# Patient Record
Sex: MALE | Race: WHITE
[De-identification: names, ages, dates, MRNs, and addresses within clinical notes are randomized per-mention and may not be internally consistent; named-entity substitution may affect disease eponyms.]

---

## 2012-06-25 VITALS — SYSTOLIC BLOOD PRESSURE: 139 MMHG | DIASTOLIC BLOOD PRESSURE: 68 MMHG

## 2018-12-28 ENCOUNTER — HOSPITAL ENCOUNTER (INPATIENT)
Dept: HOSPITAL 97 - ER | Age: 63
LOS: 2 days | Discharge: HOME | DRG: 639 | End: 2018-12-30
Attending: INTERNAL MEDICINE | Admitting: INTERNAL MEDICINE
Payer: COMMERCIAL

## 2018-12-28 DIAGNOSIS — Z89.511: ICD-10-CM

## 2018-12-28 DIAGNOSIS — E10.40: ICD-10-CM

## 2018-12-28 DIAGNOSIS — Z89.512: ICD-10-CM

## 2018-12-28 DIAGNOSIS — J11.1: ICD-10-CM

## 2018-12-28 DIAGNOSIS — I25.10: ICD-10-CM

## 2018-12-28 DIAGNOSIS — E10.10: Primary | ICD-10-CM

## 2018-12-28 DIAGNOSIS — I10: ICD-10-CM

## 2018-12-28 DIAGNOSIS — K21.9: ICD-10-CM

## 2018-12-28 DIAGNOSIS — Z87.891: ICD-10-CM

## 2018-12-28 DIAGNOSIS — J44.9: ICD-10-CM

## 2018-12-28 DIAGNOSIS — E03.9: ICD-10-CM

## 2018-12-28 LAB
ALBUMIN SERPL BCP-MCNC: 3.3 G/DL (ref 3.4–5)
ALP SERPL-CCNC: 96 U/L (ref 45–117)
ALT SERPL W P-5'-P-CCNC: 24 U/L (ref 12–78)
AST SERPL W P-5'-P-CCNC: 20 U/L (ref 15–37)
BUN BLD-MCNC: 22 MG/DL (ref 7–18)
BUN BLD-MCNC: 24 MG/DL (ref 7–18)
GLUCOSE SERPLBLD-MCNC: 242 MG/DL (ref 74–106)
GLUCOSE SERPLBLD-MCNC: 340 MG/DL (ref 74–106)
HCT VFR BLD CALC: 43.3 % (ref 39.6–49)
LYMPHOCYTES # SPEC AUTO: 1.5 K/UL (ref 0.7–4.9)
PMV BLD: 9.5 FL (ref 7.6–11.3)
POTASSIUM SERPL-SCNC: 3.5 MMOL/L (ref 3.5–5.1)
POTASSIUM SERPL-SCNC: 4.1 MMOL/L (ref 3.5–5.1)
RBC # BLD: 4.77 M/UL (ref 4.33–5.43)

## 2018-12-28 PROCEDURE — 84439 ASSAY OF FREE THYROXINE: CPT

## 2018-12-28 PROCEDURE — 96375 TX/PRO/DX INJ NEW DRUG ADDON: CPT

## 2018-12-28 PROCEDURE — 87086 URINE CULTURE/COLONY COUNT: CPT

## 2018-12-28 PROCEDURE — 83735 ASSAY OF MAGNESIUM: CPT

## 2018-12-28 PROCEDURE — 87804 INFLUENZA ASSAY W/OPTIC: CPT

## 2018-12-28 PROCEDURE — 84443 ASSAY THYROID STIM HORMONE: CPT

## 2018-12-28 PROCEDURE — 71045 X-RAY EXAM CHEST 1 VIEW: CPT

## 2018-12-28 PROCEDURE — 80076 HEPATIC FUNCTION PANEL: CPT

## 2018-12-28 PROCEDURE — 94640 AIRWAY INHALATION TREATMENT: CPT

## 2018-12-28 PROCEDURE — 96361 HYDRATE IV INFUSION ADD-ON: CPT

## 2018-12-28 PROCEDURE — 83036 HEMOGLOBIN GLYCOSYLATED A1C: CPT

## 2018-12-28 PROCEDURE — 84100 ASSAY OF PHOSPHORUS: CPT

## 2018-12-28 PROCEDURE — 87088 URINE BACTERIA CULTURE: CPT

## 2018-12-28 PROCEDURE — 36415 COLL VENOUS BLD VENIPUNCTURE: CPT

## 2018-12-28 PROCEDURE — 99285 EMERGENCY DEPT VISIT HI MDM: CPT

## 2018-12-28 PROCEDURE — 81003 URINALYSIS AUTO W/O SCOPE: CPT

## 2018-12-28 PROCEDURE — 96374 THER/PROPH/DIAG INJ IV PUSH: CPT

## 2018-12-28 PROCEDURE — 82962 GLUCOSE BLOOD TEST: CPT

## 2018-12-28 PROCEDURE — 82010 KETONE BODYS QUAN: CPT

## 2018-12-28 PROCEDURE — 80048 BASIC METABOLIC PNL TOTAL CA: CPT

## 2018-12-28 PROCEDURE — 85025 COMPLETE CBC W/AUTO DIFF WBC: CPT

## 2018-12-28 PROCEDURE — 87040 BLOOD CULTURE FOR BACTERIA: CPT

## 2018-12-28 NOTE — EDPHYS
Physician Documentation                                                                           

 [facility]                                                                

Name: Aj Morocho                                                                                 

Age: 63 yrs                                                                                       

Sex: Male                                                                                         

: 1955                                                                                   

MRN: [id number]                                                                                   

Arrival Date: 2018                                                                          

Time: 19:58                                                                                       

Account#: [id number]                                                                            

Bed 23                                                                                            

Private MD:                                                                                       

ED Physician Govind Rodriguez                                                                       

HPI:                                                                                              

                                                                                             

20:14 This 63 yrs old  Male presents to ER via EMS with complaints of Abdominal      kdr 

      Pain, Vomiting, High Blood Sugar.                                                           

20:14 The patient presents to the emergency department with nausea, that is mild, vomiting,   kdr 

      that is intermittent. The patient presents to the emergency department with abdominal       

      pain, of the abdomen diffusely, described as achy, burning, crampy, intermittent, and       

      does not radiate. Onset: The symptoms/episode began/occurred gradually, 2 day(s) ago.       

      Possible causes: unknown. The symptoms are aggravated by food , The symptoms are            

      alleviated by nothing. Associated signs and symptoms: Pertinent positives: abdominal        

      pain, nausea, vomiting, Pertinent negatives: belching, constipation, diarrhea, dysuria,     

      fever, flatulence, GI bleeding, hematuria. Severity of symptoms: At their worst the         

      symptoms were mild moderate just prior to arrival, in the emergency department the          

      symptoms are unchanged. The patient has experienced similar episodes in the past,           

      several times. The patient has not recently seen a physician.                               

                                                                                                  

Historical:                                                                                       

- Allergies:                                                                                      

20:05 No Known Allergies;                                                                     bb  

- Home Meds:                                                                                      

20:05 Unable to obtain [Active];                                                              bb  

- PMHx:                                                                                           

20:05 COPD; Diabetes - IDDM; Hypertension;                                                    bb  

- PSHx:                                                                                           

20:05 hemorrhoidectomy; Heart stents; bilateral lower extremity amputations;                  bb  

                                                                                                  

- Immunization history:: Adult Immunizations up to date, Flu vaccine is up to date.               

- Social history:: Smoking status: Patient/guardian denies using tobacco,                         

  Patient/guardian denies using alcohol, street drugs.                                            

- Ebola Screening: : No symptoms or risks identified at this time.                                

                                                                                                  

                                                                                                  

ROS:                                                                                              

20:14 Constitutional: Negative for fever, chills, and weight loss, Eyes: Negative for injury, kdr 

      pain, redness, and discharge, ENT: Negative for injury, pain, and discharge, Neck:          

      Negative for injury, pain, and swelling, Cardiovascular: Negative for chest pain,           

      palpitations, and edema, Respiratory: Negative for shortness of breath, cough,              

      wheezing, and pleuritic chest pain, Back: Negative for injury and pain, : Negative        

      for injury, bleeding, discharge, and swelling, MS/Extremity: Negative for injury and        

      deformity, Skin: Negative for injury, rash, and discoloration, Neuro: Negative for          

      headache, weakness, numbness, tingling, and seizure activity. Psych: Negative for           

      depression, anxiety, suicide ideation, homicidal ideation, and hallucinations,              

      Allergy/Immunology: Negative for hives, rash, and allergies, Endocrine: Negative for        

      neck swelling, polydipsia, polyuria, polyphagia, and marked weight changes,                 

      Hematologic/Lymphatic: Negative for swollen nodes, abnormal bleeding, and unusual           

      bruising.                                                                                   

20:14 Abdomen/GI: Positive for abdominal pain, nausea and vomiting.                               

                                                                                                  

Exam:                                                                                             

20:14 Constitutional:  This is a well developed, well nourished patient who is awake, alert,  kdr 

      and in no acute distress. Head/Face:  Normocephalic, atraumatic. Eyes:  Pupils equal        

      round and reactive to light, extra-ocular motions intact.  Lids and lashes normal.          

      Conjunctiva and sclera are non-icteric and not injected.  Cornea within normal limits.      

      Periorbital areas with no swelling, redness, or edema. Neck:  Trachea midline, no           

      thyromegaly or masses palpated, and no cervical lymphadenopathy.  Supple, full range of     

      motion without nuchal rigidity, or vertebral point tenderness.  No Meningismus.             

      Chest/axilla:  Normal chest wall appearance and motion.  Nontender with no deformity.       

      No lesions are appreciated. Cardiovascular:  Regular rate and rhythm with a normal S1       

      and S2.  No gallops, murmurs, or rubs.  Normal PMI, no JVD.  No pulse deficits.             

      Respiratory:  Lungs have equal breath sounds bilaterally, clear to auscultation and         

      percussion.  No rales, rhonchi or wheezes noted.  No increased work of breathing, no        

      retractions or nasal flaring. Back:  No spinal tenderness.  No costovertebral               

      tenderness.  Full range of motion. Skin:  Warm, dry with normal turgor.  Normal color       

      with no rashes, no lesions, and no evidence of cellulitis. MS/ Extremity:  Pulses           

      equal, no cyanosis.  Neurovascular intact.  Full, normal range of motion. Neuro:  Awake     

      and alert, GCS 15, oriented to person, place, time, and situation.  Cranial nerves          

      II-XII grossly intact.  Motor strength 5/5 in all extremities.  Sensory grossly intact.     

       Cerebellar exam normal.  Normal gait. Psych:  Awake, alert, with orientation to            

      person, place and time.  Behavior, mood, and affect are within normal limits.               

20:14 Abdomen/GI: Inspection: abdomen appears normal, Bowel sounds: active, diminished, in        

      all quadrants, Palpation: soft, mild abdominal tenderness, in the abdomen diffusely,        

      mass, is not appreciated, rebound tenderness, is not appreciated, voluntary guarding,       

      is not appreciated, involuntary guarding, is not appreciated.                               

                                                                                                  

Vital Signs:                                                                                      

20:05  / 44; Pulse 100; Resp 16 S; Temp 98.1(O); Pulse Ox 97% on R/A; Weight 49.9 kg    bb  

      (R); Height 5 ft. 0 in. (152.40 cm) (R);                                                    

20:40  / 50; Pulse 109; Resp 18; Pulse Ox 98% on R/A;                                   kr2 

21:49  / 63; Pulse 114; Resp 18; Pulse Ox 98% on R/A;                                   kr2 

22:32  / 59; Pulse 115; Resp 17; Pulse Ox 97% ;                                         kr2 

                                                                                             

00:28  / 59; Pulse 110; Resp 17; Pulse Ox 99% on R/A;                                   kr2 

                                                                                             

20:05 Body Mass Index 21.48 (49.90 kg, 152.40 cm)                                             bb  

                                                                                                  

MDM:                                                                                              

                                                                                             

20:03 Patient medically screened.                                                             kdr 

20:14 Data reviewed: vital signs, nurses notes, lab test result(s), radiologic studies.       kdr 

      Counseling: I had a detailed discussion with the patient and/or guardian regarding: the     

      historical points, exam findings, and any diagnostic results supporting the                 

      discharge/admit diagnosis, lab results, radiology results.                                  

                                                                                                  

                                                                                             

20:13 Order name: CBC with Diff; Complete Time: 21:50                                         kdr 

                                                                                             

20:13 Order name: Chem 7; Complete Time: 21:50                                                kdr 

                                                                                             

20:13 Order name: LFT's; Complete Time: 21:50                                                 kdr 

                                                                                             

20:13 Order name: Ketone, Serum; Complete Time: 21:50                                         kdr 

                                                                                             

22:53 Order name: Chem 7; Complete Time: 23:51                                                kr2 

                                                                                             

23:32 Order name: Blood Culture Adult (2)                                                     kdr 

                                                                                             

23:32 Order name: CXR XRAY                                                                    kdr 

                                                                                             

23:32 Order name: Urine Culture                                                               [facility] 

                                                                                             

23:46 Order name: Urine Dipstick--Ancillary (enter results)                                   [facility] 

                                                                                             

22:15 Order name: FSBS; Complete Time: 22:15                                                  kdr 

                                                                                             

23:32 Order name: Urine Dipstick-Ancillary (obtain specimen); Complete Time: 23:46            kdr 

                                                                                                  

Administered Medications:                                                                         

      Discontinued: NS 0.9% 1000 ml IV at 200 ml/hr continuous                                    

20:20 Drug: NS 0.9% 1000 ml Route: IV; Rate: 1 bolus; Site: right forearm;                    kr2 

21:53 Follow up: Response: No adverse reaction; IV Status: Completed infusion                 kr2 

20:20 Drug: Zofran 4 mg Route: IVP; Site: right forearm;                                      kr2 

21:00 Follow up: Response: No adverse reaction; Nausea is decreased                           kr2 

22:00 Drug: Insulin Regular Human 5 units {Co-Signature: mg2 (Alexis Hall RN).} Route:    kr2 

      IVP; Site: right forearm;                                                                   

23:00 Follow up: Response: No adverse reaction; Blood sugar is lowered                        kr2 

22:25 Drug: NS 0.9% 1000 ml Route: IV; Rate: 200 ml/hr; Site: right forearm;                  kr2 

22:29 Drug: Zofran 4 mg Route: IVP; Site: right forearm;                                      kr2 

22:53 Follow up: Response: No adverse reaction; Nausea is decreased                           kr2 

                                                                                             

00:22 Drug: Insulin Drip - (Insulin Regular Human 100 units, NS 0.9% 100 ml) {Co-Signature:   kr2 

      mg2 (Alexis Hall RN).} Route: IV; Rate: calculated rate; Site: right forearm;           

                                                                                                  

                                                                                                  

Point of Care Testing:                                                                            

      Blood Glucose:                                                                              

                                                                                             

20:13 Blood Glucose: 276 mg/dL;                                                               mg2 

22:26 Blood Glucose: 234 mg/dL;                                                               kr2 

23:08 Blood Glucose: 214 mg/dL;                                                               jp3 

                                                                                             

00:27 Blood Glucose: 194 mg/dL;                                                               kr2 

      Ranges:                                                                                     

      Critical Glucose Levels:Adult <50 mg/dl or >400 mg/dl  <40 mg/dl or >180 mg/dl       

Disposition:                                                                                      

18 21:53 Hospitalization ordered by Amalia Lam for Inpatient Admission. Preliminary    

  diagnosis is DKA.                                                                               

- Bed requested for Intensive Care Unit.                                                          

- Status is Inpatient Admission.                                                              kr2 

- Condition is Fair.                                                                              

- Problem is an acute exacerbation.                                                               

- Symptoms have improved.                                                                         

UTI on Admission? No                                                                              

                                                                                                  

                                                                                                  

                                                                                                  

Signatures:                                                                                       

Dispatcher [employer]                           EDMS                                                 

Shalini Hall RN RN                                                      

Govind Rodriguez MD MD   [facility]                                                  

Roseanne Ray RN                     RN   bb                                                   

Diana Pathak, WILLIAM                       RN   kr2                                                  

Alexis Hall RN                           mg2                                                  

                                                                                                  

Corrections: (The following items were deleted from the chart)                                    

                                                                                             

23:57 21:53 Hospitalization Ordered by Amalia Lam MD for Observation. Preliminary         kdr 

      diagnosis is DKA. Bed requested for Telemetry/MedSurg (observation). Status is              

      Observation. Condition is Fair. Problem is an acute exacerbation. Symptoms have             

      improved. UTI on Admission? No. kdr                                                         

                                                                                             

00:03  23:57 2018 21:53 Hospitalization Ordered by Amalia Lam MD for Inpatient mw  

      Admission. Preliminary diagnosis is DKA. Bed requested for Intensive Care Unit. Status      

      is Inpatient Admission. Condition is Fair. Problem is an acute exacerbation. Symptoms       

      have improved. UTI on Admission? No. kdr                                                    

                                                                                             

01:29 00:03 2018 21:53 Hospitalization Ordered by Amalia Lam MD for Inpatient       kr2 

      Admission. Preliminary diagnosis is DKA. Bed requested for Intensive Care Unit. Status      

      is Inpatient Admission. Condition is Fair. Problem is an acute exacerbation. Symptoms       

      have improved. UTI on Admission? No. mw                                                     

                                                                                                  

**************************************************************************************************

## 2018-12-28 NOTE — ER
Nurse's Notes                                                                                     

 [facility]                                                                

Name: Aj Morocho                                                                                 

Age: 63 yrs                                                                                       

Sex: Male                                                                                         

: 1955                                                                                   

MRN: [id number]                                                                                   

Arrival Date: 2018                                                                          

Time: 19:58                                                                                       

Account#: [id number]                                                                            

Bed 23                                                                                            

Private MD:                                                                                       

Diagnosis: DKA                                                                                    

                                                                                                  

Presentation:                                                                                     

                                                                                             

19:59 Presenting complaint: EMS states: they were toned out for report of pt with abdominal   bb  

      pain, vomiting x 2 days also elevated blood sugar. Transition of care: patient was not      

      received from another setting of care. Onset of symptoms was 2018. Risk        

      Assessment: Do you want to hurt yourself or someone else? Patient reports no desire to      

      harm self or others. Initial Sepsis Screen: Does the patient meet any 2 criteria? No.       

      Patient's initial sepsis screen is negative. Does the patient have a suspected source       

      of infection? No. Patient's initial sepsis screen is negative. Care prior to arrival:       

      None.                                                                                       

19:59 Method Of Arrival: EMS: [facility]                                                       bb  

19:59 Acuity: OZZY 2                                                                           bb  

                                                                                                  

Historical:                                                                                       

- Allergies:                                                                                      

20:05 No Known Allergies;                                                                     bb  

- Home Meds:                                                                                      

20:05 Unable to obtain [Active];                                                              bb  

- PMHx:                                                                                           

20:05 COPD; Diabetes - IDDM; Hypertension;                                                    bb  

- PSHx:                                                                                           

20:05 hemorrhoidectomy; Heart stents; bilateral lower extremity amputations;                  bb  

                                                                                                  

- Immunization history:: Adult Immunizations up to date, Flu vaccine is up to date.               

- Social history:: Smoking status: Patient/guardian denies using tobacco,                         

  Patient/guardian denies using alcohol, street drugs.                                            

- Ebola Screening: : No symptoms or risks identified at this time.                                

                                                                                                  

                                                                                                  

Screenin:00 Abuse screen: Denies threats or abuse. Denies injuries from another. Nutritional        kr2 

      screening: No deficits noted. Tuberculosis screening: No symptoms or risk factors           

      identified. Fall Risk Gait- Normal/Bed Rest/Wheelchair (0 pts).                             

                                                                                                  

Assessment:                                                                                       

20:00 General: Appears in no apparent distress. uncomfortable, slender, Behavior is calm,     kr2 

      cooperative, appropriate for age. Pain: Complains of pain in abdomen Pain does not          

      radiate. Pain currently is 6 out of 10 on a pain scale. Quality of pain is described as     

      aching, Is continuous, Alleviated by nothing. Neuro: Level of Consciousness is awake,       

      alert, obeys commands, Oriented to person, place, time, situation. Cardiovascular:          

      Heart tones S1 S2 present Patient's skin is warm and dry. Rhythm is regular.                

      Respiratory: Airway is patent Respiratory effort is even, unlabored, Respiratory            

      pattern is regular, symmetrical. GI: Abdomen is flat, non-distended, Bowel sounds           

      present X 4 quads. Abd is soft X 4 quads Abdomen is tender to palpation X 4 quads.          

      Reports lower abdominal pain, upper abdominal pain, nausea, vomiting. EENT: Nares are       

      clear bilaterally Oral mucosa is moist. Derm: Skin is intact, with poor turgor Skin is      

      pink, warm \T\ dry. Musculoskeletal: Amputation of right leg below knee, left leg above     

      knee.                                                                                       

21:00 Reassessment: Patient appears in no apparent distress at this time. Patient and/or      kr2 

      family updated on plan of care and expected duration. Pain level reassessed. Patient is     

      alert, oriented x 3, equal unlabored respirations, skin warm/dry/pink. Patient states       

      feeling better.                                                                             

22:26 Reassessment: Patient complains of nausea, provider notified, see MAR.                  kr2 

23:30 Reassessment: Patient appears in no apparent distress at this time. Patient and/or      kr2 

      family updated on plan of care and expected duration. Pain level reassessed. Patient is     

      alert, oriented x 3, equal unlabored respirations, skin warm/dry/pink.                      

                                                                                             

00:45 Reassessment: Patient appears in no apparent distress at this time. Per Dr. Benton,   kr2 

      stop NS and start patient on D5 1/2 NS at \T\ 200mL per hour. See [employer] note.              

01:18 Reassessment: Report called to WILLIAM Sanders.                                               kr2 

                                                                                                  

Vital Signs:                                                                                      

                                                                                             

20:05  / 44; Pulse 100; Resp 16 S; Temp 98.1(O); Pulse Ox 97% on R/A; Weight 49.9 kg    bb  

      (R); Height 5 ft. 0 in. (152.40 cm) (R);                                                    

20:40  / 50; Pulse 109; Resp 18; Pulse Ox 98% on R/A;                                   kr2 

21:49  / 63; Pulse 114; Resp 18; Pulse Ox 98% on R/A;                                   kr2 

22:32  / 59; Pulse 115; Resp 17; Pulse Ox 97% ;                                         kr2 

                                                                                             

00:28  / 59; Pulse 110; Resp 17; Pulse Ox 99% on R/A;                                   kr2 

                                                                                             

20:05 Body Mass Index 21.48 (49.90 kg, 152.40 cm)                                             bb  

                                                                                                  

ED Course:                                                                                        

                                                                                             

19:58 Patient arrived in ED.                                                                  bb  

20:03 Triage completed.                                                                       bb  

20:03 Govind Rodriguez MD is Attending Physician.                                              kdr 

20:05 Arm band placed on Patient placed in an exam room, on a stretcher, on pulse oximetry.   bb  

20:13 Diana Pathak, RN is Primary Nurse.                                                     kr2 

20:13 Inserted saline lock: 22 gauge in right forearm, using aseptic technique. Blood         mg2 

      collected.                                                                                  

20:13 Initial lab(s) drawn, by ED staff, sent to lab.                                         jp3 

20:40 Patient has correct armband on for positive identification. Bed in low position. Call   kr2 

      light in reach. Side rails up X2. Pulse ox on. NIBP on. Door closed. Warm blanket           

      given. Pillow given. Head of bed elevated.                                                  

21:53 Amalia Lam MD is Hospitalizing Provider.                                          kdr 

23:16 Lab(s) recollected, by me, sent to lab. via 23-gauge butterfly in Right thumb.          jp3 

23:16 Chem 7 Sent.                                                                            jp3 

23:46 Urine Culture Sent.                                                                     kr2 

23:51 Notified ED physician of a critical lab result(s). Camelia 14, Dr. Rodriguez.              kr2 

                                                                                             

00:01 X-ray completed. Portable x-ray completed in exam room. Patient tolerated procedure     sg4 

      well.                                                                                       

00:06 CXR XRAY In Process Unspecified.                                                        EDMS

01:16 No provider procedures requiring assistance completed.                                  mg2 

01:19 Patient admitted, IV remains in place.                                                  kr2 

                                                                                                  

Administered Medications:                                                                         

      Discontinued: NS 0.9% 1000 ml IV at 200 ml/hr continuous                                    

                                                                                             

20:20 Drug: NS 0.9% 1000 ml Route: IV; Rate: 1 bolus; Site: right forearm;                    kr2 

21:53 Follow up: Response: No adverse reaction; IV Status: Completed infusion                 kr2 

20:20 Drug: Zofran 4 mg Route: IVP; Site: right forearm;                                      kr2 

21:00 Follow up: Response: No adverse reaction; Nausea is decreased                           kr2 

22:00 Drug: Insulin Regular Human 5 units {Co-Signature: mg2 (Alexis Hall RN).} Route:    kr2 

      IVP; Site: right forearm;                                                                   

23:00 Follow up: Response: No adverse reaction; Blood sugar is lowered                        kr2 

22:25 Drug: NS 0.9% 1000 ml Route: IV; Rate: 200 ml/hr; Site: right forearm;                  kr2 

22:29 Drug: Zofran 4 mg Route: IVP; Site: right forearm;                                      kr2 

22:53 Follow up: Response: No adverse reaction; Nausea is decreased                           kr2 

                                                                                             

00:22 Drug: Insulin Drip - (Insulin Regular Human 100 units, NS 0.9% 100 ml) {Co-Signature:   kr2 

      mg2 (Alexis Hall RN).} Route: IV; Rate: calculated rate; Site: right forearm;           

                                                                                                  

                                                                                                  

Point of Care Testing:                                                                            

      Blood Glucose:                                                                              

                                                                                             

20:13 Blood Glucose: 276 mg/dL;                                                               mg2 

22:26 Blood Glucose: 234 mg/dL;                                                               kr2 

23:08 Blood Glucose: 214 mg/dL;                                                               jp3 

                                                                                             

00:27 Blood Glucose: 194 mg/dL;                                                               kr2 

      Ranges:                                                                                     

                                                                                                  

Outcome:                                                                                          

                                                                                             

21:53 Decision to Hospitalize by Provider.                                                    kdr 

                                                                                             

01:19 Admitted to ICU accompanied by nurse, via stretcher, room 7, on monitor, with chart,    kr2 

      Report called to  Marilyn                                                                     

      Condition: stable                                                                           

      Instructed on the need for admit, Demonstrated understanding of instructions.               

:29 Patient left the ED.                                                                    kr2 

                                                                                                  

Signatures:                                                                                       

Dispatcher [employer]                           EDMS                                                 

Govind Rodriguez MD MD   kdr                                                  

Roseanne Ray RN RN   bb                                                   

Diana Pathak RN RN   kr2                                                  

Alexis Hall RN RN   mg2                                                  

Flo Blair                              jp3                                                  

Mari Goodson                               4                                                  

Alexis Hall RN                           mg2                                                  

                                                                                                  

Corrections: (The following items were deleted from the chart)                                    

                                                                                             

21:48 19:00 General: Appears in no apparent distress. uncomfortable, slender, Behavior is     kr2 

      calm, cooperative, appropriate for age, kr2                                                 

21:48 20:00 Reassessment: Patient appears in no apparent distress at this time. Patient       kr2 

      and/or family updated on plan of care and expected duration. Pain level reassessed.         

      Patient is alert, oriented x 3, equal unlabored respirations, skin warm/dry/pink.           

      Patient states feeling better. kr2                                                          

21:49 19:00 Pain: Complains of pain in abdomen Pain does not radiate. Pain currently is 6 out kr2 

      of 10 on a pain scale. Quality of pain is described as aching, Is continuous,               

      Alleviated by nothing. kr2                                                                  

21:49 19:00 Neuro: Level of Consciousness is awake, alert, obeys commands, Oriented to        kr2 

      person, place, time, situation, kr2                                                         

:49 19:00 Cardiovascular: Heart tones S1 S2 present Patient's skin is warm and dry. Rhythm  kr2 

      is regular kr2                                                                              

:49 19:00 Respiratory: Airway is patent Respiratory effort is even, unlabored, Respiratory  kr2 

      pattern is regular, symmetrical, kr2                                                        

:49 19:00 GI: Abdomen is flat, non-distended, Bowel sounds present X 4 quads. Abd is soft X kr2 

      4 quads Abdomen is tender to palpation X 4 quads. Reports lower abdominal pain, upper       

      abdominal pain, nausea, vomiting, kr2                                                       

: 19:00 EENT: Nares are clear bilaterally Oral mucosa is moist. kr2                       kr2 

: 19:00 Derm: Skin is intact, with poor turgor Skin is pink, warm \T\ dry. kr2              kr2

21:49 19:00 Musculoskeletal: Amputation of right leg below knee, left leg above knee. kr2     kr2 

:49 19:00 General: Appears in no apparent distress. uncomfortable, slender, Behavior is     kr2 

      calm, cooperative, appropriate for age, kr2                                                 

21:49 21:47 Reassessment: Patient appears in no apparent distress at this time. Patient       kr2 

      and/or family updated on plan of care and expected duration. Pain level reassessed.         

      Patient is alert, oriented x 3, equal unlabored respirations, skin warm/dry/pink.           

      Patient states feeling better. kr2                                                          

                                                                                             

00:58 00:45 Reassessment: Patient appears in no apparent distress at this time. Per Dr. katia Benton, stop NS and start patient on D5 1/2 NS at \T\ 200mL per hour kr2                    

                                                                                                  

**************************************************************************************************

## 2018-12-29 LAB
BUN BLD-MCNC: 12 MG/DL (ref 7–18)
BUN BLD-MCNC: 15 MG/DL (ref 7–18)
GLUCOSE SERPLBLD-MCNC: 157 MG/DL (ref 74–106)
GLUCOSE SERPLBLD-MCNC: 93 MG/DL (ref 74–106)
MAGNESIUM SERPL-MCNC: 2 MG/DL (ref 1.8–2.4)
POTASSIUM SERPL-SCNC: 3.4 MMOL/L (ref 3.5–5.1)
POTASSIUM SERPL-SCNC: 3.9 MMOL/L (ref 3.5–5.1)
TSH SERPL DL<=0.05 MIU/L-ACNC: 0.07 UIU/ML (ref 0.36–3.74)

## 2018-12-29 RX ADMIN — OSELTAMIVIR PHOSPHATE SCH MG: 75 CAPSULE ORAL at 17:13

## 2018-12-29 RX ADMIN — GABAPENTIN SCH MG: 100 CAPSULE ORAL at 07:59

## 2018-12-29 RX ADMIN — GABAPENTIN SCH MG: 100 CAPSULE ORAL at 20:04

## 2018-12-29 RX ADMIN — SODIUM CHLORIDE SCH MLS: 0.9 INJECTION, SOLUTION INTRAVENOUS at 20:40

## 2018-12-29 RX ADMIN — ARFORMOTEROL TARTRATE SCH MCG: 15 SOLUTION RESPIRATORY (INHALATION) at 07:53

## 2018-12-29 RX ADMIN — INSULIN GLARGINE SCH UNITS: 100 INJECTION, SOLUTION SUBCUTANEOUS at 08:00

## 2018-12-29 RX ADMIN — METOPROLOL TARTRATE SCH MG: 25 TABLET ORAL at 20:04

## 2018-12-29 RX ADMIN — HUMAN INSULIN SCH: 100 INJECTION, SOLUTION SUBCUTANEOUS at 11:30

## 2018-12-29 RX ADMIN — TICAGRELOR SCH MG: 90 TABLET ORAL at 20:04

## 2018-12-29 RX ADMIN — SODIUM CHLORIDE SCH: 0.45 INJECTION, SOLUTION INTRAVENOUS at 05:19

## 2018-12-29 RX ADMIN — SODIUM CHLORIDE SCH: 0.45 INJECTION, SOLUTION INTRAVENOUS at 01:19

## 2018-12-29 RX ADMIN — INSULIN GLARGINE SCH: 100 INJECTION, SOLUTION SUBCUTANEOUS at 06:12

## 2018-12-29 RX ADMIN — GABAPENTIN SCH MG: 100 CAPSULE ORAL at 16:43

## 2018-12-29 RX ADMIN — METOPROLOL TARTRATE SCH MG: 25 TABLET ORAL at 08:00

## 2018-12-29 RX ADMIN — HUMAN INSULIN SCH UNIT: 100 INJECTION, SOLUTION SUBCUTANEOUS at 20:40

## 2018-12-29 RX ADMIN — SODIUM CHLORIDE SCH: 0.9 INJECTION, SOLUTION INTRAVENOUS at 17:00

## 2018-12-29 RX ADMIN — SODIUM CHLORIDE SCH MLS: 0.9 INJECTION, SOLUTION INTRAVENOUS at 06:29

## 2018-12-29 RX ADMIN — HUMAN INSULIN SCH: 100 INJECTION, SOLUTION SUBCUTANEOUS at 16:30

## 2018-12-29 RX ADMIN — TICAGRELOR SCH MG: 90 TABLET ORAL at 10:27

## 2018-12-29 RX ADMIN — HUMAN INSULIN SCH UNIT: 100 INJECTION, SOLUTION SUBCUTANEOUS at 17:26

## 2018-12-29 RX ADMIN — ENOXAPARIN SODIUM SCH MG: 40 INJECTION SUBCUTANEOUS at 08:00

## 2018-12-29 RX ADMIN — ARFORMOTEROL TARTRATE SCH MCG: 15 SOLUTION RESPIRATORY (INHALATION) at 20:08

## 2018-12-29 NOTE — RAD REPORT
EXAM DESCRIPTION:  RAD - Chest Single View - 12/29/2018 12:03 am

 

CLINICAL HISTORY:  Cough, abdominal pain, vomiting, history of COPD

 

COMPARISON:  None.

 

TECHNIQUE:  AP portable chest image was obtained 2345 hours .

 

FINDINGS:  No mass or consolidations seen. Skin fold artifact seen in the lateral left upper lung fie
ld. Interstitial markings are prominent believed be baseline fibrotic change. No failure or volume ov
erload. Heart and vasculature are normal. No measurable pleural effusion and no pneumothorax. No acut
e bony abnormality seen. No acute aortic findings suspected.

 

IMPRESSION:  No failure, volume overload or acute lung parenchymal focal process.

 

Mild baseline prominence throughout the lung fields believed to be chronic interstitial disease.

## 2018-12-29 NOTE — P.HP
Certification for Inpatient


Patient admitted to: Inpatient


With expected LOS: >2 Midnights


Practitioner: I am a practitioner with admitting privileges, knowledge of 

patient current condition, hospital course, and medical plan of care.


Services: Services provided to patient in accordance with Admission 

requirements found in Title 42 Section 412.3 of the Code of Federal Regulations





Patient History


Date of Service: 12/29/18


Reason for admission: DKA


History of Present Illness: 





Mr Morocho is a 63 years old male with history of IDDM, HTN, COPD, who start 

about 2 days ago with abodminal pain associated with nausea and vomiting. He 

progressively was getting worse and came to ED for evaluation. He denied fever 

or chills. No chest pain or SOB. At arrival his BS was 340 mg/dl, had ketones 

in serum and his anion gap was 16. WBC within normal limits, UA is pending. CXR 

shows no acute abnormalities.


Home medications list reviewed: Yes





- Past Medical/Surgical History


-: HTN


-: DM I


-: COPD


-: CAD


-: bilateral lower extremity amputation


-: hemorroidectomy





- Family History


Family History: Reviewed- Non-Contributory





- Social History


Smoking Status: Former smoker


Alcohol use: No


CD- Drugs: No


Place of Residence: Home





Review of Systems


10-point ROS is otherwise unremarkable





Physical Examination





- Physical Exam


General: Alert, In no apparent distress


HEENT: Atraumatic, PERRLA, Mucous membr. moist/pink, EOMI, Sclerae nonicteric


Neck: Supple, 2+ carotid pulse no bruit, No LAD, Without JVD or thyroid 

abnormality


Respiratory: Clear to auscultation bilaterally, Normal air movement


Cardiovascular: Regular rate/rhythm, Normal S1 S2


Gastrointestinal: Normal bowel sounds, No tenderness


Musculoskeletal: No tenderness


Integumentary: No rashes


Neurological: Normal strength at 5/5 x4 extr, Normal tone, Normal affect


Lymphatics: No axilla or inguinal lymphadenopathy





- Studies


Laboratory Data (last 24 hrs)





12/28/18 23:05: Sodium 139, Potassium 3.5, BUN 22 H, Creatinine 1.07, Glucose 

242 H


12/28/18 20:05: Sodium 134 L, Potassium 4.1, BUN 24 H, Creatinine 1.11, Glucose 

340 H, Total Bilirubin 0.7, AST 20, ALT 24, Alkaline Phosphatase 96


12/28/18 20:05: WBC 7.5, Hgb 14.3, Hct 43.3, Plt Count 178








Assessment and Plan





- Problems (Diagnosis)


(1) DKA (diabetic ketoacidoses)


Current Visit: Yes   Status: Acute   


Qualifiers: 


   Diabetes mellitus type: type 1   Diabetes mellitus complication detail: 

without coma   Qualified Code(s): E10.10 - Type 1 diabetes mellitus with 

ketoacidosis without coma   





(2) Diabetes mellitus


Current Visit: Yes   Status: Acute   


Qualifiers: 


   Diabetes mellitus type: type 1   Diabetes mellitus complication status: with 

ketoacidosis   Diabetes mellitus complication detail: without coma   Qualified 

Code(s): E10.10 - Type 1 diabetes mellitus with ketoacidosis without coma   





(3) COPD (chronic obstructive pulmonary disease)


Current Visit: Yes   Status: Acute   


Qualifiers: 


   COPD type: unspecified COPD   Qualified Code(s): J44.9 - Chronic obstructive 

pulmonary disease, unspecified   





(4) HTN (hypertension)


Current Visit: Yes   Status: Acute   


Qualifiers: 


   Hypertension type: essential hypertension   Qualified Code(s): I10 - 

Essential (primary) hypertension   





- Plan





The patient will be admitted to the hospital due to DKA. Will continue with IV 

fluid infusion and insulin drip per DKA protocol. No signs of obvious 

superimpose infection.





- Advance Directives


Does patient have a Living Will: No


Does patient have a Durable POA for Healthcare: No





- Code Status/Comfort Care


Code Status Assessed: Yes


Code Status: Full Code

## 2018-12-29 NOTE — P.PN
Subjective


Date of Service: 12/29/18


[facility]: [facility]


Chief Complaint: DKA


Subjective: Improving





Physical Examination





- Vital Signs


Temperature: 98.3 F


Blood Pressure: 129/50


Pulse: 68


Respirations: 16


Pulse Ox (%): 98





- Physical Exam


General: Alert, In no apparent distress, Oriented x3, Cooperative


HEENT: Atraumatic


Neck: Supple


Respiratory: Clear to auscultation bilaterally, Normal air movement


Cardiovascular: Normal pulses, Regular rate/rhythm


Gastrointestinal: Normal bowel sounds, Soft and benign, Non-distended, No 

tenderness, No masses, No rebound, No guarding


Musculoskeletal: No erythema, No tenderness, No warmth


Integumentary: No tenderness/swelling, No erythema, No warmth, No cyanosis


Neurological: Normal speech, Normal strength at 5/5 x4 extr, Normal tone





- Studies


Laboratory Data (last 24 hrs)





12/28/18 23:05: Sodium 139, Potassium 3.5, BUN 22 H, Creatinine 1.07, Glucose 

242 H


12/28/18 20:05: Sodium 134 L, Potassium 4.1, BUN 24 H, Creatinine 1.11, Glucose 

340 H, Total Bilirubin 0.7, AST 20, ALT 24, Alkaline Phosphatase 96


12/28/18 20:05: WBC 7.5, Hgb 14.3, Hct 43.3, Plt Count 178





Medications List Reviewed: Yes





Assessment & Plan


Discharge Plan: Home


Plan to discharge in: 24 Hours


Physician Review Additional Text: 





Impression:


Diabetic ketoacidosis resolved with history of type 1 diabetes


Influenza B


COPD


Hypertension


GERD


Hypothyroidism


CAD


Patient with history of below-knee amputations





Plan:


Diabetic ketoacidosis resolved with history of type 1 diabetes:  DKA resolved.  

Patient transition to basal insulin.  Will continue sliding scale and monitor 

closely.  Will check A1c.  If stable anticipate possible discharge tomorrow.


Influenza B:  Patient found to have influenza.  Will start Tamiflu.  

Precautions in place.


COPD:  Will continue with COPD medication.  Will monitor closely.  Will wean 

off oxygen.  Will maintain sats above 90%.


Hypertension:  Home medication restarted-metoprolol


GERD:  Will continue with Protonix.


Hypothyroidism:  Tsh decrease.  Will adjust thyroid medication to 100 mcg 

daily.  Recommend to recheck tsh and free T4 in 4-6 weeks to further monitor 

and address.


CAD:  Continue with home medication-Brilinta.


Patient with history of below-knee amputations.:  Overall stable.


Time Spent Managing Pts Care (In Minutes): 55

## 2018-12-30 LAB
BUN BLD-MCNC: 6 MG/DL (ref 7–18)
GLUCOSE SERPLBLD-MCNC: 30 MG/DL (ref 74–106)
HCT VFR BLD CALC: 38.9 % (ref 39.6–49)
LYMPHOCYTES # SPEC AUTO: 1.3 K/UL (ref 0.7–4.9)
MAGNESIUM SERPL-MCNC: 2 MG/DL (ref 1.8–2.4)
PMV BLD: 9.1 FL (ref 7.6–11.3)
POTASSIUM SERPL-SCNC: 3 MMOL/L (ref 3.5–5.1)
RBC # BLD: 4.42 M/UL (ref 4.33–5.43)

## 2018-12-30 RX ADMIN — ENOXAPARIN SODIUM SCH MG: 40 INJECTION SUBCUTANEOUS at 08:42

## 2018-12-30 RX ADMIN — OSELTAMIVIR PHOSPHATE SCH MG: 75 CAPSULE ORAL at 08:42

## 2018-12-30 RX ADMIN — HUMAN INSULIN SCH UNIT: 100 INJECTION, SOLUTION SUBCUTANEOUS at 12:07

## 2018-12-30 RX ADMIN — ARFORMOTEROL TARTRATE SCH MCG: 15 SOLUTION RESPIRATORY (INHALATION) at 08:25

## 2018-12-30 RX ADMIN — SODIUM CHLORIDE SCH MLS: 0.9 INJECTION, SOLUTION INTRAVENOUS at 06:01

## 2018-12-30 RX ADMIN — HUMAN INSULIN SCH: 100 INJECTION, SOLUTION SUBCUTANEOUS at 07:30

## 2018-12-30 RX ADMIN — TICAGRELOR SCH MG: 90 TABLET ORAL at 08:42

## 2018-12-30 RX ADMIN — METOPROLOL TARTRATE SCH MG: 25 TABLET ORAL at 08:42

## 2018-12-30 RX ADMIN — GABAPENTIN SCH MG: 100 CAPSULE ORAL at 08:42

## 2018-12-30 NOTE — P.DS
Admission Date: 12/29/18


Discharge Date: 12/30/18


[facility]


Disposition: ROUTINE DISCHARGE


Discharge Condition: GOOD


Reason for Admission: DKA


Consultations: 





none





Procedures: 





CXR: 


COMPARISON:  None. 


TECHNIQUE:  AP portable chest image was obtained 2345 hours . 


FINDINGS:  No mass or consolidations seen. Skin fold artifact seen in the 

lateral left upper lung field. Interstitial markings are prominent believed be 

baseline fibrotic change. No failure or volume overload. Heart and vasculature 

are normal. No measurable pleural effusion and no pneumothorax. No acute bony 

abnormality seen. No acute aortic findings suspected. 


IMPRESSION:  No failure, volume overload or acute lung parenchymal focal 

process. 


Mild baseline prominence throughout the lung fields believed to be chronic 

interstitial disease.





Medical Problem List: 


Diabetic ketoacidosis resolved with history of type 1 diabetes


Influenza B


COPD


Hypertension


GERD


Hypothyroidism


CAD


Patient with history of below-knee amputations


Diabetic neuropathy


Brief History of Present Illness: 





63-year-old  male presented emergency room with increased nausea and 

vomiting.  Patient with history of diabetes type 1.  Patient found to be in 

diabetic ketoacidosis.  Patient was admitted to ICU for treatment.


Hospital Course: 





Patient presented with nausea, vomiting secondary to diabetic ketoacidosis.  

Patient with history of diabetes type 1. Patient was treated.  DKA resolved.  

At discharge patient will continue with his insulin therapy-Lantus 10 units 

subcu daily.  Recommendation is to maintain blood sugars less 140 fasting and 

less than 200 after meals.  Further adjustment can be done by his PCP.  Patient 

may benefit with endocrinology evaluation as an outpatient for better diabetic 

control.





Patient found to have influenza B positive.  Patient was treated with 

improvement.  At discharge patient will continue with Tamiflu 75 mg 1 pill 

twice daily for 4 more days.  Patient will also be provided Tessalon 100 mg 3 

times a day as needed for cough and Mucinex 600 mg twice daily for congestion.





Patient has COPD.  This remained stable.  At discharge he will continue with 

Symbicort 2 puffs twice daily and Pro air 2 puffs 3 times a day as needed for 

shortness of breath.  Recommendation is for the patient follow up with 

pulmonology as an outpatient to further monitor and address.





Patient has hypertension.  Patient will continue with metoprolol 25 mg 1 pill 

twice daily.  Recommendation is to maintain blood pressures less 150/80.  

Further adjustment can be done by his PCP.





Patient has GERD.  Patient continue with Protonix 40 mg 1 pill once daily.





Patient has hypothyroidism.  Tsh was low.  Medication adjusted during his stay.

  At discharge he will continue with levothyroxine 100 mcg daily.  

Recommendation is to recheck tsh and free T4 in 4-6 weeks to further monitor 

and adjust.





Patient with CAD.  Patient will continue with Brilinta 90 mg twice daily.  

Patient will follow up with cardiology as directed.





Patient with diabetic neuropathy and lower extremity amputations.  Patient may 

continue with Neurontin 100 mg 3 times a day as needed for pain. This can be 

further adjusted by his PCP.


Vital Signs/Physical Exam: 














Temp Pulse Resp BP Pulse Ox


 


 97.2 F   72   21 H  158/47 H  98 


 


 12/30/18 04:00  12/30/18 08:00  12/30/18 08:00  12/30/18 08:00  12/29/18 22:00








General: Alert, In no apparent distress, Oriented x3, Cooperative


HEENT: Atraumatic


Neck: Supple


Respiratory: Clear to auscultation bilaterally, Normal air movement


Cardiovascular: Normal pulses, Regular rate/rhythm


Gastrointestinal: Normal bowel sounds, Soft and benign, Non-distended, No 

tenderness, No masses, No rebound, No guarding


Musculoskeletal: No erythema, No tenderness, No warmth


Integumentary: No tenderness/swelling, No erythema, No warmth, No cyanosis


Neurological: Normal speech, Normal strength at 5/5 x4 extr, Normal tone, 

Normal affect


Laboratory Data at Discharge: 














WBC  5.4 K/uL (4.3-10.9)  D 12/30/18  04:21    


 


Hgb  13.5 g/dL (13.6-17.9)  L  12/30/18  04:21    


 


Hct  38.9 % (39.6-49.0)  L  12/30/18  04:21    


 


Plt Count  186 K/uL (152-406)   12/30/18  04:21    


 


Sodium  142 mmol/L (136-145)   12/30/18  04:21    


 


Potassium  3.0 mmol/L (3.5-5.1)  L  12/30/18  04:21    


 


BUN  6 mg/dL (7-18)  L  12/30/18  04:21    


 


Creatinine  0.57 mg/dL (0.55-1.3)   12/30/18  04:21    


 


Glucose  30 mg/dL ()  L*  12/30/18  04:21    


 


Phosphorus  3.1 mg/dL (2.5-4.9)   12/30/18  04:21    


 


Magnesium  2.0 mg/dL (1.8-2.4)   12/30/18  04:21    


 


Total Bilirubin  0.7 mg/dL (0.2-1.0)   12/28/18  20:05    


 


AST  20 U/L (15-37)   12/28/18  20:05    


 


ALT  24 U/L (12-78)   12/28/18  20:05    


 


Alkaline Phosphatase  96 U/L ()   12/28/18  20:05    








Home Medications: 








Metoprolol Tartrate 1 tab PO BID 12/29/18 


Ticagrelor [Brilinta*] 1 tab PO BID 12/29/18 


Albuterol Sulfate [Proair Hfa] 8.5 gm IH TID PRN #1 hfa.aer.ad 12/30/18 


Benzonatate [Tessalon Perle*] 100 mg PO Q6H PRN #30 cap 12/30/18 


Budesonide/Formoterol Fumarate [Symbicort 160-4.5 Mcg Inhaler] 2 puff IH BID #1 

hfa.aer.ad 12/30/18 


Gabapentin [Neurontin*] 100 mg PO TID PRN #30 cap 12/30/18 


Guaifenesin [Mucinex] 600 mg PO BID #20 tab.er.12h 12/30/18 


Insulin Glargine Human [Lantus*] 10 units SQ DAILY WITH BREAKFAST #1 vial 12/30/ 18 


Levothyroxine [Synthroid*] 0.1 mg PO DAILYAC #30 tab 12/30/18 


Oseltamivir [Tamiflu*] 75 mg PO BID #8 cap 12/30/18 


Pantoprazole [Protonix Tab*] 40 mg PO DAILYAC #30 tab 12/30/18 





New Medications: 


Albuterol Sulfate [Proair Hfa] 8.5 gm IH TID PRN #1 hfa.aer.ad


 PRN Reason: Shortness Of Breath


Benzonatate [Tessalon Perle*] 100 mg PO Q6H PRN #30 cap


 PRN Reason: Cough


Budesonide/Formoterol Fumarate [Symbicort 160-4.5 Mcg Inhaler] 2 puff IH BID #1 

hfa.aer.ad


Gabapentin [Neurontin*] 100 mg PO TID PRN #30 cap


 PRN Reason: Pain


Guaifenesin [Mucinex] 600 mg PO BID #20 tab.er.12h


Insulin Glargine Human [Lantus*] 10 units SQ DAILY WITH BREAKFAST #1 vial


Levothyroxine [Synthroid*] 0.1 mg PO DAILYAC #30 tab


Oseltamivir [Tamiflu*] 75 mg PO BID #8 cap


Pantoprazole [Protonix Tab*] 40 mg PO DAILYAC #30 tab


Patient Discharge Instructions: 1.  Patient will need to follow up his PCP in 1 

week to follow up this hospitalization.  2.  Patient presented with nausea, 

vomiting secondary to diabetic ketoacidosis.  Patient with history of diabetes 

type 1. Patient was treated.  DKA resolved.  At discharge patient will continue 

with his insulin therapy-Lantus 10 units subcu daily.  Recommendation is to 

maintain blood sugars less 140 fasting and less than 200 after meals.  Further 

adjustment can be done by his PCP.  Patient may benefit with endocrinology 

evaluation as an outpatient for better diabetic control.  3.  Patient found to 

have influenza B positive.  Patient was treated with improvement.  At discharge 

patient will continue with Tamiflu 75 mg 1 pill twice daily for 4 more days.  

Patient will also be provided Tessalon 100 mg 3 times a day as needed for cough 

and Mucinex 600 mg twice daily for congestion.  4.  Patient has COPD.  This 

remained stable.  At discharge he will continue with Symbicort 2 puffs twice 

daily and Pro air 2 puffs 3 times a day as needed for shortness of breath.  

Recommendation is for the patient follow up with pulmonology as an outpatient 

to further monitor and address.  5.  Patient has hypertension.  Patient will 

continue with metoprolol 25 mg 1 pill twice daily.  Recommendation is to 

maintain blood pressures less 150/80.  Further adjustment can be done by his 

PCP.  6.  Patient has GERD.  Patient continue with Protonix 40 mg 1 pill once 

daily.  7.  Patient has hypothyroidism.  Tsh was low.  Medication adjusted 

during his stay.  At discharge he will continue with levothyroxine 100 mcg 

daily.  Recommendation is to recheck tsh and free T4 in 4-6 weeks to further 

monitor and adjust.  8.  Patient with CAD.  Patient will continue with Brilinta 

90 mg twice daily.  Patient will follow up with cardiology as directed.  9.  

Patient with diabetic neuropathy and lower extremity amputations.  Patient may 

continue with Neurontin 100 mg 3 times a day as needed for pain. This can be 

further adjusted by his PCP.


Diet: ADA


Activity: Fall precautions


Time spent managing pt's care (in minutes): 55

## 2019-08-15 ENCOUNTER — HOSPITAL ENCOUNTER (INPATIENT)
Dept: HOSPITAL 97 - ER | Age: 64
LOS: 2 days | Discharge: HOME | DRG: 639 | End: 2019-08-17
Attending: FAMILY MEDICINE | Admitting: FAMILY MEDICINE
Payer: COMMERCIAL

## 2019-08-15 VITALS — BODY MASS INDEX: 15.7 KG/M2

## 2019-08-15 DIAGNOSIS — Z89.612: ICD-10-CM

## 2019-08-15 DIAGNOSIS — Z89.511: ICD-10-CM

## 2019-08-15 DIAGNOSIS — E78.5: ICD-10-CM

## 2019-08-15 DIAGNOSIS — J44.9: ICD-10-CM

## 2019-08-15 DIAGNOSIS — L89.151: ICD-10-CM

## 2019-08-15 DIAGNOSIS — I25.10: ICD-10-CM

## 2019-08-15 DIAGNOSIS — E10.10: Primary | ICD-10-CM

## 2019-08-15 DIAGNOSIS — E03.9: ICD-10-CM

## 2019-08-15 DIAGNOSIS — Z87.891: ICD-10-CM

## 2019-08-15 DIAGNOSIS — Z95.5: ICD-10-CM

## 2019-08-15 DIAGNOSIS — I25.2: ICD-10-CM

## 2019-08-15 LAB
ALBUMIN SERPL BCP-MCNC: 3.5 G/DL (ref 3.4–5)
ALP SERPL-CCNC: 104 U/L (ref 45–117)
ALT SERPL W P-5'-P-CCNC: 24 U/L (ref 12–78)
AST SERPL W P-5'-P-CCNC: 28 U/L (ref 15–37)
BUN BLD-MCNC: 19 MG/DL (ref 7–18)
BUN BLD-MCNC: 23 MG/DL (ref 7–18)
BUN BLD-MCNC: 28 MG/DL (ref 7–18)
BUN BLD-MCNC: 34 MG/DL (ref 7–18)
COHGB MFR BLDA: 1.1 % (ref 0–1.5)
GLUCOSE SERPLBLD-MCNC: 111 MG/DL (ref 74–106)
GLUCOSE SERPLBLD-MCNC: 149 MG/DL (ref 74–106)
GLUCOSE SERPLBLD-MCNC: 199 MG/DL (ref 74–106)
GLUCOSE SERPLBLD-MCNC: 419 MG/DL (ref 74–106)
HCT VFR BLD CALC: 43.4 % (ref 39.6–49)
LIPASE SERPL-CCNC: 20 U/L (ref 73–393)
LYMPHOCYTES # SPEC AUTO: 1.4 K/UL (ref 0.7–4.9)
OXYHGB MFR BLDA: 73.2 % (ref 94–97)
PMV BLD: 9.9 FL (ref 7.6–11.3)
POTASSIUM SERPL-SCNC: 3.8 MMOL/L (ref 3.5–5.1)
POTASSIUM SERPL-SCNC: 3.9 MMOL/L (ref 3.5–5.1)
POTASSIUM SERPL-SCNC: 4.1 MMOL/L (ref 3.5–5.1)
POTASSIUM SERPL-SCNC: 4.8 MMOL/L (ref 3.5–5.1)
RBC # BLD: 4.72 M/UL (ref 4.33–5.43)
SAO2 % BLDA: 74.8 % (ref 92–98.5)

## 2019-08-15 PROCEDURE — 80076 HEPATIC FUNCTION PANEL: CPT

## 2019-08-15 PROCEDURE — 96366 THER/PROPH/DIAG IV INF ADDON: CPT

## 2019-08-15 PROCEDURE — 71045 X-RAY EXAM CHEST 1 VIEW: CPT

## 2019-08-15 PROCEDURE — 36415 COLL VENOUS BLD VENIPUNCTURE: CPT

## 2019-08-15 PROCEDURE — 82010 KETONE BODYS QUAN: CPT

## 2019-08-15 PROCEDURE — 99285 EMERGENCY DEPT VISIT HI MDM: CPT

## 2019-08-15 PROCEDURE — 82805 BLOOD GASES W/O2 SATURATION: CPT

## 2019-08-15 PROCEDURE — 83735 ASSAY OF MAGNESIUM: CPT

## 2019-08-15 PROCEDURE — 84100 ASSAY OF PHOSPHORUS: CPT

## 2019-08-15 PROCEDURE — 85025 COMPLETE CBC W/AUTO DIFF WBC: CPT

## 2019-08-15 PROCEDURE — 96365 THER/PROPH/DIAG IV INF INIT: CPT

## 2019-08-15 PROCEDURE — 83690 ASSAY OF LIPASE: CPT

## 2019-08-15 PROCEDURE — 96375 TX/PRO/DX INJ NEW DRUG ADDON: CPT

## 2019-08-15 PROCEDURE — 80061 LIPID PANEL: CPT

## 2019-08-15 PROCEDURE — 80048 BASIC METABOLIC PNL TOTAL CA: CPT

## 2019-08-15 PROCEDURE — 96361 HYDRATE IV INFUSION ADD-ON: CPT

## 2019-08-15 PROCEDURE — 82962 GLUCOSE BLOOD TEST: CPT

## 2019-08-15 RX ADMIN — SODIUM CHLORIDE SCH MLS: 0.45 INJECTION, SOLUTION INTRAVENOUS at 12:44

## 2019-08-15 RX ADMIN — DEXTROSE, SODIUM CHLORIDE, AND POTASSIUM CHLORIDE SCH MLS: 5; .45; .15 INJECTION INTRAVENOUS at 21:01

## 2019-08-15 RX ADMIN — DEXTROSE, SODIUM CHLORIDE, AND POTASSIUM CHLORIDE SCH MLS: 5; .45; .15 INJECTION INTRAVENOUS at 14:31

## 2019-08-15 RX ADMIN — MORPHINE SULFATE PRN MG: 4 INJECTION, SOLUTION INTRAMUSCULAR; INTRAVENOUS at 23:45

## 2019-08-15 RX ADMIN — SODIUM CHLORIDE SCH: 0.9 INJECTION, SOLUTION INTRAVENOUS at 13:21

## 2019-08-15 RX ADMIN — SODIUM CHLORIDE SCH: 0.45 INJECTION, SOLUTION INTRAVENOUS at 21:00

## 2019-08-15 RX ADMIN — GABAPENTIN PRN MG: 300 CAPSULE ORAL at 22:59

## 2019-08-15 RX ADMIN — DEXTROSE, SODIUM CHLORIDE, AND POTASSIUM CHLORIDE SCH: 5; .45; .15 INJECTION INTRAVENOUS at 11:21

## 2019-08-15 RX ADMIN — SODIUM CHLORIDE SCH MLS: 0.9 INJECTION, SOLUTION INTRAVENOUS at 22:24

## 2019-08-15 RX ADMIN — SODIUM CHLORIDE SCH: 0.9 INJECTION, SOLUTION INTRAVENOUS at 11:21

## 2019-08-15 RX ADMIN — SODIUM CHLORIDE SCH: 0.45 INJECTION, SOLUTION INTRAVENOUS at 17:00

## 2019-08-15 NOTE — XMS REPORT
Clinical Summary

 Created on:August 15, 2019



Patient:Aj Morocho

Sex:Male

:1955

External Reference #:[id number]





Demographics







 Address  [address]



   [place] [address]

 

 Home Phone  [phone]

 

 Mobile Phone  [phone]

 

 Preferred Language  English

 

 Marital Status  Unknown

 

 Synagogue Affiliation  Unknown

 

 Race  White

 

 Ethnic Group  Not  or 









Author







 Organization  [facility]

 

 Address  [address] [place] [address]









Support







 Name  Relationship  Address  Phone

 

 Oliva Morocho  Unavailable  Unavailable  [phone]

 

 Daniel Morocho  Unavailable  [phone]









Care Team Providers







 Name  Role  Phone

 

 Fernando  Primary Care Provider  [phone]









Allergies

No Known Allergies



Medications







 Medication  Sig  Dispensed  Refills  Start Date  End Date  Status

 

 aspirin 81 MG EC  Take 81 mg by mouth    0      Active



 tablet  daily.          

 

 gabapentin  Take 300 mg by mouth    0      Active



 (NEURONTIN) 300 MG  3 (three) times          



 capsule  daily.          

 

 levothyroxine  Take 100 mcg by    0      Active



 (SYNTHROID,  mouth Every morning          



 LEVOTHROID) 100 MCG  on an empty stomach.          



 tablet            

 

 simvastatin (ZOCOR)  Take 40 mg by mouth    0      Active



 40 MG tablet  nightly.          

 

 ticagrelor  Take by mouth 2    0      Active



 (BRILINTA) 90 mg Tab  (two) times daily.          



 tablet            

 

 albuterol HFA  Inhale by mouth via    0      Active



 (VENTOLIN HFA) 90  inhaler every 6          



 mcg/actuation  (six) hours as          



 inhaler  needed for Wheezing          



   .          

 

 budesonide-formotero  Inhale 2 puffs by    0      Active



 l (SYMBICORT)  mouth via inhaler 2          



 160-4.5  (two) times daily.          



 mcg/actuation            



 inhaler            

 

 brinzolamide (AZOPT)  Place 1 drop into    0      Active



 1 % ophthalmic  both eyes 3 (three)          



 suspension  times daily.          

 

 latanoprost  1 drop nightly.    0      Active



 (XALATAN) 0.005 %            



 ophthalmic solution            

 

 prednisoLONE acetate  1 drop 4 (four)    0      Active



 (PRED FORTE) 1 %  times daily.          



 ophthalmic            



 suspension            

 

 tiotropium (SPIRIVA)  Inhale 18 mcg by    0      Active



 18 mcg inhalation  mouth via inhaler          



 capsule  daily.          

 

 traMADol (ULTRAM) 50  Take 50 mg by mouth    0      Active



 mg tablet  every 6 (six) hours          



   as needed for Pain.          

 

 amoxicillin-clavulan  Take 1 tablet by    0      Active



 ate (AUGMENTIN)  mouth 2 (two) times          



 875-125 mg per  daily.          



 tablet            

 

 fluticasone  1 spray by Nasal    0      Active



 (FLONASE) 50  route 2 (two) times          



 mcg/actuation nasal  daily.          



 spray            

 

 montelukast  Take 10 mg by mouth    0      Active



 (SINGULAIR) 10 mg  nightly.          



 tablet            

 

 pantoprazole  Take 40 mg by mouth    0      Active



 (PROTONIX) 40 MG  daily.          



 tablet            

 

 predniSONE  Take 5 mg by mouth    0      Active



 (DELTASONE) 5 MG  daily.          



 tablet            

 

 insulin detemir  Inject 20 Units  10 mL  0  06/15/2017    Active



 (LEVEMIR) 100  subcutaneously          



 unit/mL injection  daily.          







Active Problems







 Problem  Noted Date

 

 Carotid stenosis  2017

 

 History of coronary artery disease  2017

 

 PVD (peripheral vascular disease)  2017

 

 COPD (chronic obstructive pulmonary disease)  2017

 

 DM (diabetes mellitus)  2017

 

 Hypothyroidism  2017

 

 History of below knee amputation, right: in 2017

 

 History of above knee amputation, left: in 2017

 

 Dizziness  06/15/2017

 

 TIA (transient ischemic attack)  2017







Family History







 Medical History  Relation  Name  Comments

 

 Heart disease  Father    









 Relation  Name  Status  Comments

 

 Father      

 

 Mother      







Social History







 Tobacco Use  Types  Packs/Day  Years Used  Date

 

 Former Smoker    1  46  Quit: 2012









 Alcohol Use  Drinks/Week  oz/Week  Comments

 

 No      









 Sex Assigned at Birth  Date Recorded

 

 Not on file  









 Job Start Date  Occupation  Industry

 

 Not on file  Not on file  Not on file









 Travel History  Travel Start  Travel End









 No recent travel history available.







Last Filed Vital Signs

Not on file



Plan of Treatment

Not on file



Results

Not on fileafter 2018



Insurance







 Payer  Benefit Plan / Group  Subscriber ID  Type  Phone  Address

 

 MEDICARE  MEDICARE A B  xxxxxxxxxx  Medicare    









 Guarantor Name  Account Type  Relation to  Date of  Phone  Billing Address



     Patient  Birth    

 

 Aj Morocho  Personal/Famil  Self  1955  [phone]  [place]      ([place] [address]







Advance Directives

For more information, please contact:[facility] [place] [address] [place], [place] [phone]





 Code Status  Date Activated  Date Inactivated  Comments

 

 Partial Code  2017 10:30 PM  2017  6:08 PM  









 This code status was determined by:  Patient  

 

 Drug Protocol After Arrest Occurs?  Yes  

 

 Mechanical Ventilation with Intubation?  No  

 

 Bag/Mask?  No  

 

 Internal/External Pacemaker?  No  

 

 Transfer to Critical Care?  No  

 

 Chest Compressions?  No  

 

 Defibrillation/Cardioversion?  No

## 2019-08-15 NOTE — EDPHYS
Physician Documentation                                                                           

 [facility]                                                                 

Name: Aj Morocho                                                                                 

Age: 63 yrs                                                                                       

Sex: Male                                                                                         

: 1955                                                                                   

MRN: [id number]                                                                                   

Arrival Date: 08/15/2019                                                                          

Time: 06:00                                                                                       

Account#: [id number]                                                                            

Bed 6                                                                                             

Private MD:                                                                                       

ED Physician Donte Sanches                                                                       

HPI:                                                                                              

08/15                                                                                             

06:54 This 63 yrs old  Male presents to ER via EMS with complaints of n/v with       jr8 

      abdominal pain.                                                                             

06:54 The patient presents with abdominal pain in the upper abdomen. Onset: The               jr8 

      symptoms/episode began/occurred acutely, last night. The symptoms do not radiate.           

      Associated signs and symptoms: Pertinent positives: nausea and vomiting. The symptoms       

      are described as crampy. Modifying factors: The symptoms are alleviated by nothing, the     

      symptoms are aggravated by nothing. Severity of pain: At its worst the pain was             

      moderate in the emergency department the pain is unchanged. It is unknown whether or        

      not the patient has had similar symptoms in the past. The patient has not recently seen     

      a physician. Patient stated that he worries that his blood sugar is high. Type 1            

      diabetic with multiple complications stemming from long standing diabetic history .         

                                                                                                  

Historical:                                                                                       

- Allergies:                                                                                      

06:06 NKA;                                                                                    fc  

- Home Meds:                                                                                      

06:11 aspirin 81 mg Oral TbEC 1 tab once daily [Active]; BRILINTA 90 mg oral tab 1 tab 2      fc  

      times per day [Active]; levothyroxine 100 mcg tab 1 tab once daily [Active]; metoprolol     

      tartrate 25 mg Oral tab 1 tab 2 times per day [Active]; simvastatin 40 mg Oral tab 1        

      tab nightly [Active]; Insulin [Active];                                                     

- PMHx:                                                                                           

06:06 COPD; Diabetes - IDDM; Hypertension; Hypothyroidism; Myocardial infarction; High        fc  

      Cholesterol;                                                                                

- PSHx:                                                                                           

06:06 Heart stents; Left AKA; Right BKA;                                                      fc  

                                                                                                  

- Immunization history:: Last tetanus immunization: up to date.                                   

- Social history:: Smoking status: Patient/guardian denies using tobacco, the patient             

  reports quitting approximately 3 years ago, Patient/guardian denies using alcohol,              

  street drugs.                                                                                   

- Ebola Screening: : Patient negative for fever greater than or equal to 101.5 degrees            

  Fahrenheit, and additional compatible Ebola Virus Disease symptoms Patient denies               

  exposure to infectious person Patient denies travel to an Ebola-affected area in the            

  21 days before illness onset.                                                                   

                                                                                                  

                                                                                                  

ROS:                                                                                              

06:54 Eyes: Negative for injury, pain, redness, and discharge, ENT: Negative for injury,      jr8 

      pain, and discharge, Neck: Negative for injury, pain, and swelling, Cardiovascular:         

      Negative for chest pain, palpitations, and edema, Respiratory: Negative for shortness       

      of breath, cough, wheezing, and pleuritic chest pain, Back: Negative for injury and         

      pain, MS/Extremity: Negative for injury and deformity, Skin: Negative for injury, rash,     

      and discoloration, Neuro: Negative for headache, weakness, numbness, tingling, and          

      seizure.                                                                                    

06:54 Abdomen/GI: Positive for nausea and vomiting, abdominal cramps.                             

                                                                                                  

Exam:                                                                                             

06:54 Eyes:  Pupils equal round and reactive to light, extra-ocular motions intact.  Lids and jr8 

      lashes normal.  Conjunctiva and sclera are non-icteric and not injected.  Cornea within     

      normal limits.  Periorbital areas with no swelling, redness, or edema. ENT:  Nares          

      patent. No nasal discharge, no septal abnormalities noted.  Tympanic membranes are          

      normal and external auditory canals are clear.  Oropharynx with no redness, swelling,       

      or masses, exudates, or evidence of obstruction, uvula midline.  Mucous membranes           

      moist. Neck:  Trachea midline, no thyromegaly or masses palpated, and no cervical           

      lymphadenopathy.  Supple, full range of motion without nuchal rigidity, or vertebral        

      point tenderness.  No Meningismus. Cardiovascular:  Regular rate and rhythm with a          

      normal S1 and S2.  No gallops, murmurs, or rubs.  Normal PMI, no JVD.  No pulse             

      deficits. Respiratory:  Lungs have equal breath sounds bilaterally, clear to                

      auscultation and percussion.  No rales, rhonchi or wheezes noted.  No increased work of     

      breathing, no retractions or nasal flaring. Abdomen/GI:  Soft, non-tender, with normal      

      bowel sounds.  No distension or tympany.  No guarding or rebound.  No evidence of           

      tenderness throughout. Back:  No spinal tenderness.  No costovertebral tenderness.          

      Full range of motion. Skin:  Warm, dry with normal turgor.  Normal color with no            

      rashes, no lesions, and no evidence of cellulitis. MS/ Extremity:  Pulses equal, no         

      cyanosis.  Neurovascular intact.  Full, normal range of motion. Neuro:  Awake and           

      alert, GCS 15, oriented to person, place, time, and situation.  Cranial nerves II-XII       

      grossly intact.  Motor strength 5/5 in all extremities.  Sensory grossly intact.            

      Cerebellar exam normal.  Normal gait.                                                       

                                                                                                  

Vital Signs:                                                                                      

05:55  / 84; Pulse 103; Resp 18; Temp 97.7(O); Pulse Ox 99% on R/A; Weight 45.36 kg     fc  

      (R); Pain 8/10;                                                                             

07:33  / 45; Pulse 114; Resp 23 S; Pulse Ox 98% on R/A;                                 jl7 

08:00  / 40; Pulse 117; Resp 24 S; Pulse Ox 98% on R/A;                                 jl7 

08:17 Weight 41.73 kg (M);                                                                    jl7 

08:30  / 61; Pulse 115; Resp 22 S; Pulse Ox 97% on R/A;                                 jl7 

09:00  / 69; Pulse 115; Resp 23 S; Pulse Ox 99% on R/A;                                 jl7 

09:47  / 71; Pulse 114; Resp 24 S; Pulse Ox 97% on R/A;                                 jl7 

10:51  / 77; Pulse 111; Resp 22 S; Pulse Ox 97% on R/A;                                 jl7 

                                                                                                  

MDM:                                                                                              

06:17 Patient medically screened.                                                             jr8 

08:32 Data reviewed: vital signs, nurses notes, lab test result(s). Data interpreted: Pulse   jr8 

      oximetry: on room air is 98 %. Interpretation: normal. Arterial blood gas: pH: 7.22,        

      Interpretation: metabolic acidosis. Counseling: I had a detailed discussion with the        

      patient and/or guardian regarding: the historical points, exam findings, and any            

      diagnostic results supporting the discharge/admit diagnosis, lab results, the need for      

      further work-up and treatment in the hospital. Physician consultation: Belle Magdaleno MD      

      was called at 08:32, was contacted at 08:32, regarding admission, to the ICU, consult,      

      patient's condition, and will see patient.                                                  

                                                                                                  

08/15                                                                                             

06:04 Order name: Glucose, Ancillary Testing; Complete Time: 06:26                            EDMS

08/15                                                                                             

06:28 Order name: Creatinine for Radiology; Complete Time: 07:59                              jr8 

08/15                                                                                             

06:28 Order name: Basic Metabolic Panel; Complete Time: 07:59                                 jr8 

08/15                                                                                             

06:28 Order name: CBC with Diff; Complete Time: 07:59                                         jr8 

08/15                                                                                             

06:28 Order name: Hepatic Function; Complete Time: 07:59                                      jr8 

08/15                                                                                             

06:28 Order name: Lipase; Complete Time: 07:59                                                jr8 

08/15                                                                                             

06:28 Order name: Ketone, Serum; Complete Time: 07:59                                         jr8 

08/15                                                                                             

08:30 Order name: ABG Arterial Blood Gas                                                      EDMS

08/15                                                                                             

06:28 Order name: IV Saline Lock; Complete Time: 06:50                                        jr8 

08/15                                                                                             

06:28 Order name: Labs collected and sent; Complete Time: 06:50                                

                                                                                                  

Administered Medications:                                                                         

06:56 Drug: Zofran 4 mg Route: IVP; Site: right hand;                                         jd3 

07:20 Follow up: Response: No adverse reaction; Nausea unchanged                               

06:56 Drug: NS 0.9% 1000 ml Route: IV; Rate: 1000 ml; Site: right hand;                       jd3 

08:00 Follow up: IV Status: Completed infusion; IV Intake: 1000ml                             7 

07:22 Drug: Phenergan 12.5 mg Route: IVP; Site: right hand;                                   jl7 

07:45 Follow up: Response: No adverse reaction; Nausea is decreased                            

08:03 Drug: NS 0.9% 1000 ml Route: IV; Rate: 1000 ml; Site: left forearm;                     jl7 

09:27 Follow up: IV Status: Completed infusion; IV Intake: 1000ml                             jl7 

08:44 Drug: Insulin Drip - (Insulin Regular Human 100 units, NS 0.9% 100 ml) {Co-Signature:   jl7 

      rb1 (Sandra Hartmann RN).} Route: IV; Rate: calculated rate; Site: right hand;               

11:13 Follow up: IV Status: Infusion continued upon admission                                  

09:12 Drug: Zofran 4 mg Route: IVP; Site: left forearm;                                       jl7 

10:02 Follow up: Response: No adverse reaction                                                jl7 

09:14 Drug: fentaNYL (PF) 25 mcg Route: IVP; Site: left forearm;                              jl7 

09:40 Follow up: Response: No adverse reaction; Pain is decreased                             jl7 

                                                                                                  

                                                                                                  

Point of Care Testing:                                                                            

      Blood Glucose:                                                                              

06:02 Blood Glucose: 373 mg/dL;                                                               fc  

09:41 Blood Glucose: 352 mg/dL;                                                               jl7 

10:41 Blood Glucose: 315 mg/dL;                                                               jl7 

      Ranges:                                                                                     

      Critical Glucose Levels:Adult <50 mg/dl or >400 mg/dl  <40 mg/dl or >180 mg/dl       

Disposition:                                                                                      

08/15/19 08:33 Hospitalization ordered by Belle Magdaleno for Inpatient Admission. Preliminary      

  diagnosis is Diabetes mellitus due to underlying condition with ketoacidosis                    

  without coma.                                                                                   

- Bed requested for Intensive Care Unit.                                                          

- Status is Inpatient Admission.                                                              jl7 

- Condition is Stable.                                                                            

- Problem is new.                                                                                 

- Symptoms have improved.                                                                         

UTI on Admission? No                                                                              

                                                                                                  

                                                                                                  

                                                                                                  

Signatures:                                                                                       

Dispatcher [employer]                           EDMS                                                 

Dexter Mcdowell MD MD cha Chretien, Felicia, RN                   RN                                                      

Tarik Clay PA PA   jr8                                                  

Mei Hussein RN                        RN   jl7                                                  

Rikki Bazan RN                    RN   jJyoti Vargas RN                            rb1                                                  

                                                                                                  

Corrections: (The following items were deleted from the chart)                                    

08:47 08:33 Hospitalization Ordered by Belle Magdaleno MD for Inpatient Admission. Preliminary   eb  

      diagnosis is Diabetes mellitus due to underlying condition with ketoacidosis without        

      coma. Bed requested for Intensive Care Unit. Status is Inpatient Admission. Condition       

      is Stable. Problem is new. Symptoms have improved. UTI on Admission? No. jr8                

10:02 08:47 08/15/2019 08:33 Hospitalization Ordered by Belle Magdaleno MD for Inpatient         eb  

      Admission. Preliminary diagnosis is Diabetes mellitus due to underlying condition with      

      ketoacidosis without coma. Bed requested for Intensive Care Unit. Status is Inpatient       

      Admission. Condition is Stable. Problem is new. Symptoms have improved. UTI on              

      Admission? No. eb                                                                           

11:42 10:02 08/15/2019 08:33 Hospitalization Ordered by Belle Magdaleno MD for Inpatient         jl7 

      Admission. Preliminary diagnosis is Diabetes mellitus due to underlying condition with      

      ketoacidosis without coma. Bed requested for Intensive Care Unit. Status is Inpatient       

      Admission. Condition is Stable. Problem is new. Symptoms have improved. UTI on              

      Admission? No. eb                                                                           

                                                                                                  

**************************************************************************************************

## 2019-08-15 NOTE — XMS REPORT
Patient Summary Document

 Created on:August 15, 2019



Patient:HEIDI MCCLAIN

Sex:Male

:1955

External Reference #:[id number]





Demographics







 Address  [address] [place] [address]

 

 Home Phone  [phone]

 

 Email Address  NONE

 

 Preferred Language  Unknown

 

 Marital Status  Unknown

 

 Islam Affiliation  Unknown

 

 Race  Unknown

 

 Additional Race(s)  Unavailable

 

 Ethnic Group  Unknown









Author







 Organization  [facility]

 

 Address  [address]



   [place] [address]

 

 Phone  [phone]









Care Team Providers







 Name  Role  Phone

 

 LOVELY CORDON  Unavailable  Unavailable









Problems

This patient has no known problems.



Allergies, Adverse Reactions, Alerts

This patient has no known allergies or adverse reactions.



Medications

This patient has no known medications.



Results







 Test Description  Test Time  Test Comments  Text Results  Atomic Results  
Result Comments









 POCT-GLUCOSE METER  2017 11:26:00    









   Test Item  Value  Reference Range  Comments









 POC-GLUCOSE METER (BEAKER) (test  268 mg/dL    TESTED AT [facility] [address] 
[facility]



 vpaw=1267)      [facility] [address]



POCT-GLUCOSE [id number] 07:49:00





 Test Item  Value  Reference Range  Comments

 

 POC-GLUCOSE METER (BEAKER)  219 mg/dL    [facility] [address] [facility]



 (test kmsh=5707)      [facility] [address]



BASIC METABOLIC [id number] 07:26:00





 Test Item  Value  Reference Range  Comments

 

 SODIUM (BEAKER) (test  134 meq/L  136-145  



 lniw=280)      

 

 POTASSIUM (BEAKER) (test  3.6 meq/L  3.5-5.1  



 code=379)      

 

 CHLORIDE (BEAKER) (test  103 meq/L    



 code=382)      

 

 CO2 (BEAKER) (test  23 meq/L  22-29  



 code=355)      

 

 BLOOD UREA NITROGEN  21 mg/dL  7-21  



 (BEAKER) (test code=354)      

 

 CREATININE (BEAKER) (test  1.23 mg/dL  0.57-1.25  



 code=358)      

 

 GLUCOSE RANDOM (BEAKER)  212 mg/dL    



 (test code=652)      

 

 CALCIUM (BEAKER) (test  9.0 mg/dL  8.4-10.2  



 code=697)      

 

 EGFR (BEAKER) (test  60 mL/min/1.73 sq m    ESTIMATED GFR IS NOT AS



 code=1092)      ACCURATE AS CREATININE



       CLEARANCE IN PREDICTING



       GLOMERULAR FILTRATION



       RATE. ESTIMATED GFR IS



       NOT APPLICABLE FOR



       DIALYSIS PATIENTS.



CBC W/PLT COUNT &amp; AUTO [id number] 06:02:00





 Test Item  Value  Reference Range  Comments

 

 WHITE BLOOD CELL COUNT (BEAKER) (test code=775)  6.5 K/ L  4.0-10.0  

 

 RED BLOOD CELL COUNT (BEAKER) (test code=761)  3.90 M/ L  4.20-5.80  

 

 HEMOGLOBIN (BEAKER) (test code=410)  12.0 GM/DL  13.0-16.8  

 

 HEMATOCRIT (BEAKER) (test code=411)  36.0 %  40.0-50.0  

 

 MEAN CORPUSCULAR VOLUME (BEAKER) (test code=753)  92.2 fL  82.0-98.0  

 

 MEAN CORPUSCULAR HEMOGLOBIN (BEAKER) (test  30.8 pg  27.0-33.0  



 code=751)      

 

 MEAN CORPUSCULAR HEMOGLOBIN CONC (BEAKER) (test  33.4 GM/DL  32.0-36.0  



 code=752)      

 

 RED CELL DISTRIBUTION WIDTH (BEAKER) (test  12.2 %  10.3-14.2  



 code=412)      

 

 PLATELET COUNT (BEAKER) (test code=756)  187 K/CU MM  150-430  

 

 MEAN PLATELET VOLUME (BEAKER) (test code=754)  8.0 fL  6.5-10.5  

 

 NUCLEATED RED BLOOD CELLS (BEAKER) (test  0 /100 WBC  0-0  



 code=413)      

 

 NEUTROPHILS RELATIVE PERCENT (BEAKER) (test  58 %    



 code=429)      

 

 LYMPHOCYTES RELATIVE PERCENT (BEAKER) (test  23 %    



 code=430)      

 

 MONOCYTES RELATIVE PERCENT (BEAKER) (test  11 %    



 code=431)      

 

 EOSINOPHILS RELATIVE PERCENT (BEAKER) (test  7 %    



 code=432)      

 

 BASOPHILS RELATIVE PERCENT (BEAKER) (test  1 %    



 code=437)      

 

 NEUTROPHILS ABSOLUTE COUNT (BEAKER) (test  3.78 K/ L  1.80-8.00  



 code=670)      

 

 LYMPHOCYTES ABSOLUTE COUNT (BEAKER) (test  1.52 K/ L  1.48-4.50  



 code=414)      

 

 MONOCYTES ABSOLUTE COUNT (BEAKER) (test  0.73 K/ L  0.00-1.30  



 code=415)      

 

 EOSINOPHILS ABSOLUTE COUNT (BEAKER) (test  0.48 K/ L  0.00-0.50  



 code=416)      

 

 BASOPHILS ABSOLUTE COUNT (BEAKER) (test  0.04 K/ L  0.00-0.20  



 code=417)      



0.00POCT-GLUCOSE METER2017-06-15 23:23:00





 Test Item  Value  Reference Range  Comments

 

 POC-GLUCOSE METER (BEAKER)  339 mg/dL    TESTED AT [address]



 (test notb=4178)      [facility] [address]



POCT-GLUCOSE METER2017-06-15 19:55:00





 Test Item  Value  Reference Range  Comments

 

 POC-GLUCOSE METER (BEAKER)  366 mg/dL    Will Repeat Test/TESTED AT



 (test code=1538)      [address]



       [address]



POCT-GLUCOSE METER2017-06-15 18:14:00





 Test Item  Value  Reference Range  Comments

 

 POC-GLUCOSE METER (BEAKER)  410 mg/dL    TESTED AT [address]



 (test cbji=1768)      [facility] [address]



POCT-GLUCOSE METER2017-06-15 16:43:00





 Test Item  Value  Reference Range  Comments

 

 POC-GLUCOSE METER (BEAKER)  403 mg/dL    Notified RN MD/TESTED AT [facility]



 (test code=1538)      [address] [place] [address]



CBC W/PLT COUNT &amp; AUTO DIFFERENTIAL2017-06-15 11:17:00





 Test Item  Value  Reference Range  Comments

 

 WHITE BLOOD CELL COUNT (BEAKER) (test code=775)  8.5 K/ L  4.0-10.0  

 

 RED BLOOD CELL COUNT (BEAKER) (test code=761)  4.34 M/ L  4.20-5.80  

 

 HEMOGLOBIN (BEAKER) (test code=410)  13.1 GM/DL  13.0-16.8  

 

 HEMATOCRIT (BEAKER) (test code=411)  40.4 %  40.0-50.0  

 

 MEAN CORPUSCULAR VOLUME (BEAKER) (test code=753)  93.1 fL  82.0-98.0  

 

 MEAN CORPUSCULAR HEMOGLOBIN (BEAKER) (test  30.1 pg  27.0-33.0  



 code=751)      

 

 MEAN CORPUSCULAR HEMOGLOBIN CONC (BEAKER) (test  32.3 GM/DL  32.0-36.0  



 code=752)      

 

 RED CELL DISTRIBUTION WIDTH (BEAKER) (test  13.4 %  10.3-14.2  



 code=412)      

 

 PLATELET COUNT (BEAKER) (test code=756)  190 K/CU MM  150-430  

 

 MEAN PLATELET VOLUME (BEAKER) (test code=754)  8.3 fL  6.5-10.5  

 

 NUCLEATED RED BLOOD CELLS (BEAKER) (test  0 /100 WBC  0-0  



 code=413)      

 

 NEUTROPHILS RELATIVE PERCENT (BEAKER) (test  68 %    



 code=429)      

 

 LYMPHOCYTES RELATIVE PERCENT (BEAKER) (test  18 %    



 code=430)      

 

 MONOCYTES RELATIVE PERCENT (BEAKER) (test  7 %    



 code=431)      

 

 EOSINOPHILS RELATIVE PERCENT (BEAKER) (test  5 %    



 code=432)      

 

 BASOPHILS RELATIVE PERCENT (BEAKER) (test  1 %    



 code=437)      

 

 NEUTROPHILS ABSOLUTE COUNT (BEAKER) (test  5.84 K/ L  1.80-8.00  



 code=670)      

 

 LYMPHOCYTES ABSOLUTE COUNT (BEAKER) (test  1.57 K/ L  1.48-4.50  



 code=414)      

 

 MONOCYTES ABSOLUTE COUNT (BEAKER) (test  0.62 K/ L  0.00-1.30  



 code=415)      

 

 EOSINOPHILS ABSOLUTE COUNT (BEAKER) (test  0.44 K/ L  0.00-0.50  



 code=416)      

 

 BASOPHILS ABSOLUTE COUNT (BEAKER) (test  0.06 K/ L  0.00-0.20  



 code=417)      



0.00POCT-GLUCOSE METER2017-06-15 08:34:00





 Test Item  Value  Reference Range  Comments

 

 POC-GLUCOSE METER (BEAKER)  293 mg/dL    TESTED AT [facility] [address] [facility]



 (test anmb=5246)      [facility] [address]



CBC W/PLT COUNT &amp; AUTO DIFFERENTIAL2017-06-15 07:38:00





 Test Item  Value  Reference Range  Comments

 

 WHITE BLOOD CELL COUNT (BEAKER)   K/ L  4.0-10.0  clotThis is a corrected



 (test code=775)      result. Previous result was



       0.0 K/ L on 6/15/2017 at



       0733 CDT

 

 RED BLOOD CELL COUNT (BEAKER)   M/ L  4.20-5.80  clotThis is a corrected



 (test code=761)      result. Previous result was



       2.30 M/ L on 6/15/2017 at



       0733 CDT

 

 HEMOGLOBIN (BEAKER) (test   GM/DL  13.0-16.8  clotThis is a corrected



 code=410)      result. Previous result was



       9.8 GM/DL on 6/15/2017 at



       [address] [place], [place] [address]T

 

 HEMATOCRIT (BEAKER) (test   %  40.0-50.0  clotThis is a corrected



 code=411)      result. Previous result was



       21.4 % on 6/15/2017 at [address] [place], [place] [address]T

 

 MEAN CORPUSCULAR VOLUME   fL  82.0-98.0  clotThis is a corrected



 (BEAKER) (test code=753)      result. Previous result was



       92.8 fL on 6/15/2017 at [address] [place], [place] [address]T

 

 MEAN CORPUSCULAR HEMOGLOBIN   pg  27.0-33.0  clotThis is a corrected



 (BEAKER) (test code=751)      result. Previous result was



       42.5 pg on 6/15/2017 at [address] [place], [place] [address]T

 

 MEAN CORPUSCULAR HEMOGLOBIN   GM/DL  32.0-36.0  clotThis is a corrected



 CONC (BEAKER) (test code=752)      result. Previous result was



       45.8 GM/DL on 6/15/2017 at



       [address] [place], [place] [address]

 

 RED CELL DISTRIBUTION WIDTH   %  10.3-14.2  clotThis is a corrected



 (BEAKER) (test code=412)      result. Previous result was



       12.1 % on 6/15/2017 at [address] [place], [place] [address]T

 

 PLATELET COUNT (BEAKER) (test   K/CU MM  150-430  clotThis is a corrected



 code=756)      result. Previous result was



       168 K/CU MM on 6/15/2017 at



       [address] [place], [place] [address]

 

 MEAN PLATELET VOLUME (BEAKER)   fL  6.5-10.5  clotThis is a corrected



 (test code=754)      result. Previous result was



       8.1 fL on 6/15/2017 at [address] [place], [place] [address]

 

 NUCLEATED RED BLOOD CELLS   /100 WBC  0-0  This is a corrected result.



 (BEAKER) (test code=413)      Previous result was 0 /100



       WBC on 6/15/2017 at [address] [place], [place] [address]T



[id number] METABOLIC PANEL2017-06-15 06:27:00





 Test Item  Value  Reference Range  Comments

 

 SODIUM (BEAKER) (test  135 meq/L  136-145  



 hnjm=655)      

 

 POTASSIUM (BEAKER) (test  4.6 meq/L  3.5-5.1  Specimen slightly



 code=379)      hemolyzed

 

 CHLORIDE (BEAKER) (test  106 meq/L    



 code=382)      

 

 CO2 (BEAKER) (test  17 meq/L  22-29  



 code=355)      

 

 BLOOD UREA NITROGEN  20 mg/dL  7-21  



 (BEAKER) (test code=354)      

 

 CREATININE (BEAKER) (test  0.94 mg/dL  0.57-1.25  Specimen slightly



 code=358)      hemolyzed

 

 GLUCOSE RANDOM (BEAKER)  237 mg/dL    



 (test code=652)      

 

 CALCIUM (BEAKER) (test  9.1 mg/dL  8.4-10.2  



 code=697)      

 

 EGFR (BEAKER) (test  82 mL/min/1.73 sq m    ESTIMATED GFR IS NOT AS



 code=1092)      ACCURATE AS CREATININE



       CLEARANCE IN PREDICTING



       GLOMERULAR FILTRATION



       RATE. ESTIMATED GFR IS



       NOT APPLICABLE FOR



       DIALYSIS PATIENTS.



POCT-GLUCOSE [id number] 21:00:00





 Test Item  Value  Reference Range  Comments

 

 POC-GLUCOSE METER (BEAKER)  203 mg/dL    TESTED AT [facility] [address]



 (test nmbf=4425)      [facility] [address]

## 2019-08-15 NOTE — RAD REPORT
EXAM DESCRIPTION:  [place] Single View8/15/2019 12:00 pm

 

CLINICAL HISTORY:  Cough

 

COMPARISON:  2017

 

FINDINGS:   The left lateral costophrenic sulcus is not included in the field of view and is not eval
uated

 

Lungs are markedly hyperaerated

 

The lungs appear clear of acute infiltrate. The heart is normal size

 

IMPRESSION:   No acute abnormalities displayed

## 2019-08-15 NOTE — ER
Nurse's Notes                                                                                     

 [facility]                                                                 

Name: Aj Morocho                                                                                 

Age: 63 yrs                                                                                       

Sex: Male                                                                                         

: 1955                                                                                   

MRN: [id number]                                                                                   

Arrival Date: 08/15/2019                                                                          

Time: 06:00                                                                                       

Account#: [id number]                                                                            

Bed 6                                                                                             

Private MD:                                                                                       

Diagnosis: Diabetes mellitus due to underlying condition with ketoacidosis without coma           

                                                                                                  

Presentation:                                                                                     

08/15                                                                                             

05:55 Presenting complaint: Patient states: that he has been feeling sick x 4 days. Then for  fc  

      the last 2 days he has been vomiting. Also complains of pain all over. Concerned that       

      it has something to do with his DM, unable to check blood sugars at home. Transition of     

      care: patient was not received from another setting of care. Onset of symptoms was          

      2019. Risk Assessment: Do you want to hurt yourself or someone else? Patient     

      reports no desire to harm self or others. Initial Sepsis Screen: Does the patient meet      

      any 2 criteria? HR > 90 bpm. Yes Does the patient have a suspected source of infection?     

      No. Patient's initial sepsis screen is negative. Care prior to arrival: Glucose check:      

      343.                                                                                        

05:55 Method Of Arrival: EMS: [facility]                                                      

05:55 Acuity: OZZY 3                                                                           fc  

                                                                                                  

Historical:                                                                                       

- Allergies:                                                                                      

06:06 NKA;                                                                                    fc  

- Home Meds:                                                                                      

06:11 aspirin 81 mg Oral TbEC 1 tab once daily [Active]; BRILINTA 90 mg oral tab 1 tab 2      fc  

      times per day [Active]; levothyroxine 100 mcg tab 1 tab once daily [Active]; metoprolol     

      tartrate 25 mg Oral tab 1 tab 2 times per day [Active]; simvastatin 40 mg Oral tab 1        

      tab nightly [Active]; Insulin [Active];                                                     

- PMHx:                                                                                           

06:06 COPD; Diabetes - IDDM; Hypertension; Hypothyroidism; Myocardial infarction; High        fc  

      Cholesterol;                                                                                

- PSHx:                                                                                           

06:06 Heart stents; Left AKA; Right BKA;                                                      fc  

                                                                                                  

- Immunization history:: Last tetanus immunization: up to date.                                   

- Social history:: Smoking status: Patient/guardian denies using tobacco, the patient             

  reports quitting approximately 3 years ago, Patient/guardian denies using alcohol,              

  street drugs.                                                                                   

- Ebola Screening: : Patient negative for fever greater than or equal to 101.5 degrees            

  Fahrenheit, and additional compatible Ebola Virus Disease symptoms Patient denies               

  exposure to infectious person Patient denies travel to an Ebola-affected area in the            

  21 days before illness onset.                                                                   

                                                                                                  

                                                                                                  

Screenin:04 Abuse screen: Denies threats or abuse. Nutritional screening: No deficits noted.        fc  

      Tuberculosis screening: No symptoms or risk factors identified.                             

06:11 Fall Risk Secondary diagnosis (15 points) impaired mobility, Gait- Impaired (20 pts.).  jd3 

      Mental Status- Oriented to own ability (0 pts). Total Byers Fall Scale indicates Low        

      Risk Score (25-44 pts). Fall prevention measures have been instituted. Side Rails Up X      

      2 Placed close to Nursing Station Frequent Obs/Assesments occuring.                         

                                                                                                  

Assessment:                                                                                       

06:07 General: Appears in no apparent distress. uncomfortable, Behavior is calm, cooperative, jd3 

      appropriate for age. Pain: Complains of pain in generalized Quality of pain is              

      described as aching. Neuro: Level of Consciousness is awake, alert, obeys commands,         

      Oriented to person, place, time, situation. Cardiovascular: Capillary refill < 3            

      seconds Patient's skin is warm and dry. Respiratory: Airway is patent Respiratory           

      effort is even, unlabored, Respiratory pattern is regular, symmetrical, Denies cough,       

      shortness of breath. GI: Abdomen is flat, non-distended, Abd is soft and non tender X 4     

      quads. Reports nausea, vomiting. : No signs and/or symptoms were reported regarding       

      the genitourinary system. EENT: No signs and/or symptoms were reported regarding the        

      EENT system. Derm: Skin is intact, Skin is dry, Skin is pale, Skin temperature is warm.     

      Musculoskeletal: Amputation of above knee on left. below knee on right.. Circulation,       

      motion, and sensation intact. Range of motion: intact in all extremities.                   

07:10 Reassessment: Patient and/or family updated on plan of care and expected duration. Pain jl7 

      level reassessed. Patient is alert, oriented x 3, equal unlabored respirations, skin        

      warm/dry/pink. Pain: Complains of pain in all over Quality of pain is described as          

      aching, Pain began 2-3 days ago. Is continuous. Neuro: Level of Consciousness is awake,     

      alert, obeys commands, Oriented to person, place, time, situation. Cardiovascular:          

      Heart tones S1 S2 present Patient's skin is warm and dry. Respiratory: Airway is patent     

      Respiratory effort is even, unlabored, Respiratory pattern is symmetrical, tachypnea.       

      GI: Abdomen is flat, non-distended, Stools are reported to be normal. Last BM was           

      2019. Bowel sounds hypoactive in right upper quadrant, left upper quadrant,      

      right lower quadrant and left lower quadrant Abd is soft and non tender X 4 quads.          

      Reports nausea. : No signs and/or symptoms were reported regarding the genitourinary      

      system. Derm: Skin is pink, warm \T\ dry. Musculoskeletal: Amputation of Above the knee     

      on left, BKA on right.                                                                      

07:20 Reassessment: Pt actively vomiting, ERP notified, see MAR for orders.                   jl7 

08:15 Reassessment: Patient appears in no apparent distress at this time. Patient and/or      jl7 

      family updated on plan of care and expected duration. Pain level reassessed. Patient is     

      alert, oriented x 3, equal unlabored respirations, skin warm/dry/pink. ERP at bedside       

      discussing plan of care.                                                                    

09:15 Reassessment: Patient appears in no apparent distress at this time. No changes from     jl7 

      previously documented assessment. Patient and/or family updated on plan of care and         

      expected duration. Pain level reassessed. Patient is alert, oriented x 3, equal             

      unlabored respirations, skin warm/dry/pink. Pt c/o of aching all over and not being         

      able to rest, requesting pain medication, provided pt with warm blankets and                

      repositioned pt, ERD notified, see MAR for orders.                                          

09:40 Reassessment: pt laying in bed with eyes closed, respirations even and unlabored.       jl7 

      Reassessed sugar, FSBS 352, pt reports "I was about to fall asleep." Turned lights off      

      and shut door to decrease noises.                                                           

11:00 Reassessment: Patient appears in no apparent distress at this time. No changes from     jl7 

      previously documented assessment. Patient and/or family updated on plan of care and         

      expected duration. Pain level reassessed. Patient is alert, oriented x 3, equal             

      unlabored respirations, skin warm/dry/pink.                                                 

                                                                                                  

Vital Signs:                                                                                      

05:55  / 84; Pulse 103; Resp 18; Temp 97.7(O); Pulse Ox 99% on R/A; Weight 45.36 kg     fc  

      (R); Pain 8/10;                                                                             

07:33  / 45; Pulse 114; Resp 23 S; Pulse Ox 98% on R/A;                                 jl7 

08:00  / 40; Pulse 117; Resp 24 S; Pulse Ox 98% on R/A;                                 jl7 

08:17 Weight 41.73 kg (M);                                                                    jl7 

08:30  / 61; Pulse 115; Resp 22 S; Pulse Ox 97% on R/A;                                 jl7 

09:00  / 69; Pulse 115; Resp 23 S; Pulse Ox 99% on R/A;                                 jl7 

09:47  / 71; Pulse 114; Resp 24 S; Pulse Ox 97% on R/A;                                 jl7 

10:51  / 77; Pulse 111; Resp 22 S; Pulse Ox 97% on R/A;                                 jl7 

                                                                                                  

ED Course:                                                                                        

05:55 Arm band placed on Patient placed in an exam room, on a stretcher.                      fc  

06:00 Patient arrived in ED.                                                                  fc  

06:03 Triage completed.                                                                       fc  

06:04 Patient has correct armband on for positive identification. Bed in low position. Call     

      light in reach. Side rails up X2. Cardiac monitor on. Pulse ox on. NIBP on.                 

06:04 No provider procedures requiring assistance completed.                                  fc  

06:07 Rikki Bazan, RN is Primary Nurse.                                                  jd3 

06:17 Tarik Clay PA is PHCP.                                                               jr8 

06:17 Donte Sanches MD is Attending Physician.                                              jr8 

06:50 Inserted saline lock: 24 gauge in right hand, using aseptic technique. Blood collected. jd3 

07:16 Mei Hussein RN is Primary Nurse.                                                      jl7 

08:20 Inserted saline lock: 22 gauge in left forearm, using aseptic technique.                jl7 

08:32 Michel Magdaleno MD is Hospitalizing Provider.                                            jr8 

08:32 Belle Magdaleno MD is Hospitalizing Provider.                                            jr8 

11:12 Patient admitted, IV remains in place. intact, No redness/swelling at site.             jl7 

                                                                                                  

Administered Medications:                                                                         

06:56 Drug: Zofran 4 mg Route: IVP; Site: right hand;                                         jd3 

07:20 Follow up: Response: No adverse reaction; Nausea unchanged                              jl7 

06:56 Drug: NS 0.9% 1000 ml Route: IV; Rate: 1000 ml; Site: right hand;                       jd3 

08:00 Follow up: IV Status: Completed infusion; IV Intake: 1000ml                             jl7 

07:22 Drug: Phenergan 12.5 mg Route: IVP; Site: right hand;                                   jl7 

07:45 Follow up: Response: No adverse reaction; Nausea is decreased                           jl7 

08:03 Drug: NS 0.9% 1000 ml Route: IV; Rate: 1000 ml; Site: left forearm;                     jl7 

09:27 Follow up: IV Status: Completed infusion; IV Intake: 1000ml                             jl7 

08:44 Drug: Insulin Drip - (Insulin Regular Human 100 units, NS 0.9% 100 ml) {Co-Signature:   jl7 

      rb1 (Sandra Hartmann RN).} Route: IV; Rate: calculated rate; Site: right hand;               

11:13 Follow up: IV Status: Infusion continued upon admission                                 jl7 

09:12 Drug: Zofran 4 mg Route: IVP; Site: left forearm;                                       jl7 

10:02 Follow up: Response: No adverse reaction                                                jl7 

09:14 Drug: fentaNYL (PF) 25 mcg Route: IVP; Site: left forearm;                              jl7 

09:40 Follow up: Response: No adverse reaction; Pain is decreased                             jl7 

                                                                                                  

                                                                                                  

Point of Care Testing:                                                                            

      Blood Glucose:                                                                              

06:02 Blood Glucose: 373 mg/dL;                                                                 

09:41 Blood Glucose: 352 mg/dL;                                                               jl7 

10:41 Blood Glucose: 315 mg/dL;                                                               jl7 

      Ranges:                                                                                     

                                                                                                  

Intake:                                                                                           

08:00 IV: 1000ml; Total: 1000ml.                                                              7 

09:27 IV: 1000ml; Total: 2000ml.                                                              jl7 

                                                                                                  

Outcome:                                                                                          

08:33 Decision to Hospitalize by Provider.                                                    jr8 

11:12 Admitted to ICU accompanied by nurse, accompanied by tech, via stretcher, room 2, on    7 

      monitor, with chart, Report called to  WILLIAM Mary                                          

11:12 Condition: stable                                                                           

11:12 Discharge instructions given to patient, Instructed on the need for admit, Demonstrated     

      understanding of instructions.                                                              

11:42 Patient left the ED.                                                                    jl7 

                                                                                                  

Signatures:                                                                                       

Ruth Monet RN RN fc Roszak, Josh, PA PA   jr8                                                  

Mei Hussein RN RN jl7 Davies, Jonathon, RN RN   jd3                                                  

Sandra Hartmann RN                            rb1                                                  

                                                                                                  

Corrections: (The following items were deleted from the chart)                                    

06:59 06:07 Musculoskeletal: Amputation of no signs of infection. skin intact. Circulation,   jd3 

      motion, and sensation intact. Range of motion: intact in all extremities, jd3               

                                                                                                  

**************************************************************************************************

## 2019-08-15 NOTE — P.HP
Certification for Inpatient


Patient admitted to: Inpatient


With expected LOS: >2 Midnights


Practitioner: I am a practitioner with admitting privileges, knowledge of 

patient current condition, hospital course, and medical plan of care.


Services: Services provided to patient in accordance with Admission 

requirements found in Title 42 Section 412.3 of the Code of Federal Regulations





Patient History


Date of Service: 08/15/19


Reason for admission: DKA


History of Present Illness: 


This is a 63 yrs old male with a PMH of IDDM, hypothyroid, COPD, CAD, MI w/ 4 

stent placement admitted for DKA.  Patient states that for the past 4 days, he 

has not been generally feeling well and has been having abdominal pain.  He 

describes it as crampy abdominal pain, in the upper abdomen area. He denies any 

radiation of the pain.  Associated with nausea/vomiting. No alleviating or 

exacerbating factors.  He thought his blood sugars may have been high as he has 

not been taking his lantus since he started feeling bad 4 days ago.  He did 

take his novolog yesterday and today. He was brought to the ER 


In the ER, BP was 135/84,, RR 18, afebrile at 97.7, 99%on RA.  His BMI is 

15.8.  His labs were remarkable for a anion GAP of 23, large acetone, pH of 

7.23.  Sodium low at 135, Cr elevated to 1.40. He was given 2 L IVF bolus, 

insulin drip, zofran, phenergan and fentanyl.  His CXR was without any acute 

abnormalities. 


At the time of my exam, he was AAOx3, in no acute distress and hemodynamically 

stable. He reported improvement in his abdominal pain. 


Allergies





No Known Drug Allergies Allergy (Verified 05/13/15 09:09)


 Unknown


No Known Allergies Allergy (Uncoded 09/14/16 00:28)


 Unknown





Home medications list reviewed: Yes


Home Medications: 








RX: Aspirin 81 mg PO DAILY 10/14/16 


RX: Gabapentin [Gralise] 300 mg PO TID 10/14/16 


RX: Insulin Aspart [Novolog*] See Protocol SQ AC 10/14/16 


RX: Levothyroxine [Synthroid*] 100 mcg PO [id number] 10/14/16 


RX: Simvastatin [Zocor*] 40 mg PO BEDTIME 10/14/16 


RX: Ticagrelor [Brilinta*] 90 mg PO BID* 10/14/16 


RX: Albuterol Inhaler [Ventolin Inhaler*] 2 puff IH Q6H PRN 10/19/16 


RX: Brinzolamide/Brimonidine Tart [Simbrinza 1%-0.2% Eye Drops] 1 drop OP BID 10

/19/16 


RX: Budesonide/Formoterol Fumarate [Symbicort 160-4.5 Mcg Inhaler] 2 puff IH 

BID 10/19/16 


RX: Latanoprost Ophth [Xalatan 0.005%*] 1 drop EACH EYE BEDTIME 10/19/16 


RX: Prednisolone Acetate 1 drop OP QID 10/19/16 


RX: Tiotropium Bromide [Spiriva] 18 mcg IH DAILY 10/19/16 


RX: Tramadol HCl [Ultram] 50 mg PO TID 10/19/16 


RX: Fluticasone [Flonase 50MCG Nasal Spray*] 1 sprays CLAUDIA BID #1 btl 10/21/16 


RX: Glucerna Shake [Glucerna*] 237 ml PO BID #60 can 10/21/16 


RX: Montelukast [Singulair*] 10 mg PO BEDTIME #30 tab 10/21/16 


RX: Pantoprazole [Protonix Tab*] 40 mg PO DAILY #30 tab 10/21/16 


RX: predniSONE [Prednisone*] 5 mg PO DAILY #30 tab 10/21/16 


RX: Insulin Glargine,Hum.rec.anlog [Lantus] 10 unit SQ DAILY WITH BREAKFAST 06/ 13/17 








- Past Medical/Surgical History


Has patient received pneumonia vaccine in the past: No


Diabetic: Yes


-: Hyperlipidemia


-: IDDM


-: Hypothyroidism


-: COPD


-: Left wrist fx 06/2014


-: Arthritis


-: PAD


-: DM


-: MI with 4 stents 2015


-: Cataract sx


-: Hemmorhoidectomy


-: L AKA 2005


-: Rt BKA





- Social History


Smoking Status: Former smoker


Alcohol use: No


CD- Drugs: No


Caffeine use: Yes


Place of Residence: Home





Review of Systems


10-point ROS is otherwise unremarkable





Physical Examination





- Vital Signs


Temperature: 98.1 F


Blood Pressure: 165/77


Pulse: 106


Respirations: 15


Pulse Ox (%): 93





- Physical Exam


General: Alert, In no apparent distress, Oriented x3


HEENT: Atraumatic, PERRLA, Mucous membr. moist/pink, EOMI, Sclerae nonicteric


Neck: Supple, 2+ carotid pulse no bruit, No LAD, Without JVD or thyroid 

abnormality


Respiratory: Clear to auscultation bilaterally, Normal air movement


Cardiovascular: Regular rate/rhythm, Normal S1 S2


Gastrointestinal: Normal bowel sounds, No tenderness


Musculoskeletal: No tenderness, Other (left AKA, Right BKA)


Integumentary: Pressure ulcer (blanchable, stage 1, sacral )


Neurological: Normal gait, Normal speech, Normal strength at 5/5 x4 extr, 

Normal tone, Normal affect


Lymphatics: No axilla or inguinal lymphadenopathy





- Studies


Laboratory Data (last 24 hrs)





08/15/19 06:47: WBC 9.0, Hgb 14.0, Hct 43.4, Plt Count 212


08/15/19 06:47: Sodium 135 L, Potassium 4.8, BUN 34 H, Creatinine 1.40 H, 

Glucose 419 H*, Total Bilirubin 1.5 H, AST 28, ALT 24, Alkaline Phosphatase 104

, Lipase 20 L


08/15/19 06:47: Creatinine 1.41 H








Assessment and Plan





- Problems (Diagnosis)


(1) DKA (diabetic ketoacidoses)


Onset Date: 10/17/16   Current Visit: No   Status: Resolved   


Plan: 


Likely secondary to non taking insulin


- DKA protocol , IVFs, insulin drip


- Monitor via labs, acetone levels and blood sugars


- Monitor Vital signs


Qualifiers: 


   Diabetes mellitus type: type 1   Diabetes mellitus complication detail: 

without coma   Qualified Code(s): E10.10 - Type 1 diabetes mellitus with 

ketoacidosis without coma   





(2) COPD (chronic obstructive pulmonary disease)


Onset Date: 06/14/17   Current Visit: No   Status: Chronic   


Qualifiers: 


   COPD type: chronic bronchitis   Chronic bronchitis type: unspecified   

Qualified Code(s): J42 - Unspecified chronic bronchitis   





(3) CAD (coronary artery disease)


Current Visit: No   Status: Chronic   


Qualifiers: 


   Coronary Disease-Associated Artery/Lesion type: unspecified vessel or lesion 

type   Native vs. transplanted heart: unspecified whether native or 

transplanted heart   Associated angina: angina presence unspecified   Qualified 

Code(s): I25.10 - Atherosclerotic heart disease of native coronary artery 

without angina pectoris   





(4) Diabetes mellitus


Onset Date: 06/14/17   Current Visit: No   Status: Chronic   


Plan: 


Continue monitor blood sugars and adjust insulin as needed.


Qualifiers: 


   Diabetes mellitus type: type 1   Diabetes mellitus complication status: with 

circulatory complication   Diabetes mellitus complication detail: with other 

circulatory complications   Qualified Code(s): E10.59 - Type 1 diabetes 

mellitus with other circulatory complications   





(5) History of above knee amputation


Current Visit: No   Status: Chronic   


Qualifiers: 


   Laterality: left   Qualified Code(s): Z89.612 - Acquired absence of left leg 

above knee   





(6) History of amputation below knee


Current Visit: No   Status: Chronic   


Qualifiers: 


   Laterality: right   Qualified Code(s): Z89.511 - Acquired absence of right 

leg below knee   





(7) Hyperlipidemia


Current Visit: No   Status: Chronic   


Qualifiers: 


   Hyperlipidemia type: unspecified   Qualified Code(s): E78.5 - Hyperlipidemia

, unspecified   





(8) Hypothyroidism


Current Visit: No   Status: Chronic   


Qualifiers: 


   Hypothyroidism type: unspecified   Qualified Code(s): E03.9 - Hypothyroidism

, unspecified   





(9) Pressure ulcer


Current Visit: No   Status: Chronic   


Plan: 


Off-load


Qualifiers: 


   Pressure injury location: sacral region   Pressure injury stage: stage 1   

Qualified Code(s): L89.151 - Pressure ulcer of sacral region, stage 1   





- Plan


DVT Prophylaxis: Lovenox


GI prophylaxis: none


Diet: NPO 





Plan: Admit to ICU, follow DKA protocol.  Pending symptomatic improvement. 





- Advance Directives


Does patient have a Living Will: Yes


Does patient have a Durable POA for Healthcare: Yes


Critical Care: Yes


Time Spent Managing Pts Care (In Minutes): 55

## 2019-08-16 VITALS — OXYGEN SATURATION: 96 %

## 2019-08-16 LAB
BUN BLD-MCNC: 14 MG/DL (ref 7–18)
GLUCOSE SERPLBLD-MCNC: 70 MG/DL (ref 74–106)
HCT VFR BLD CALC: 35 % (ref 39.6–49)
HDLC SERPL-MCNC: 49 MG/DL (ref 40–60)
LDLC SERPL CALC-MCNC: 37 MG/DL (ref ?–130)
LYMPHOCYTES # SPEC AUTO: 1.7 K/UL (ref 0.7–4.9)
MAGNESIUM SERPL-MCNC: 1.8 MG/DL (ref 1.8–2.4)
PMV BLD: 10 FL (ref 7.6–11.3)
POTASSIUM SERPL-SCNC: 4 MMOL/L (ref 3.5–5.1)
RBC # BLD: 3.91 M/UL (ref 4.33–5.43)

## 2019-08-16 RX ADMIN — GUAIFENESIN SCH MG: 600 TABLET, EXTENDED RELEASE ORAL at 20:25

## 2019-08-16 RX ADMIN — METOPROLOL TARTRATE SCH MG: 25 TABLET ORAL at 20:26

## 2019-08-16 RX ADMIN — Medication SCH: at 20:27

## 2019-08-16 RX ADMIN — SODIUM CHLORIDE SCH MLS: 0.9 INJECTION, SOLUTION INTRAVENOUS at 11:20

## 2019-08-16 RX ADMIN — HUMAN INSULIN SCH UNIT: 100 INJECTION, SOLUTION SUBCUTANEOUS at 11:30

## 2019-08-16 RX ADMIN — SODIUM CHLORIDE SCH MLS: 0.9 INJECTION, SOLUTION INTRAVENOUS at 23:51

## 2019-08-16 RX ADMIN — TICAGRELOR SCH MG: 90 TABLET ORAL at 20:26

## 2019-08-16 RX ADMIN — HUMAN INSULIN SCH: 100 INJECTION, SOLUTION SUBCUTANEOUS at 07:30

## 2019-08-16 RX ADMIN — HUMAN INSULIN SCH: 100 INJECTION, SOLUTION SUBCUTANEOUS at 16:30

## 2019-08-16 RX ADMIN — HUMAN INSULIN SCH: 100 INJECTION, SOLUTION SUBCUTANEOUS at 20:27

## 2019-08-16 RX ADMIN — ENOXAPARIN SODIUM SCH MG: 40 INJECTION SUBCUTANEOUS at 08:25

## 2019-08-16 NOTE — P.PN
Subjective


Date of Service: 08/16/19


Chief Complaint: DKA


Subjective: Improving





Patient seen and examined at bedside. Chart reviewed and case discussed with 

nursing staff. No family at bedside. 


Patient reports improved symptoms.  Gap closed x 2, hemodynamically stable. He 

has been off of the insulin drip since 10:30 pm last night. Blood sugars 

improved. 


No complaints this am. 





Review of Systems


10-point ROS is otherwise unremarkable





Physical Examination





- Vital Signs


Temperature: 97.1 F


Blood Pressure: 106/63


Pulse: 74


Respirations: 20


Pulse Ox (%): 94





- Physical Exam


General: Alert, In no apparent distress, Oriented x3


HEENT: Atraumatic, PERRLA, EOMI


Neck: Supple, JVD not distended


Respiratory: Clear to auscultation bilaterally, Normal air movement


Cardiovascular: Regular rate/rhythm, Normal S1 S2


Gastrointestinal: Normal bowel sounds, No tenderness


Musculoskeletal: Other (Left AKA, Right BKA; )


Integumentary: No rashes


Neurological: Normal speech, Normal tone, Normal affect


Lymphatics: No axilla or inguinal lymphadenopathy





Assessment And Plan





- Current Problems (Diagnosis)


(1) DKA (diabetic ketoacidoses)


Onset Date: 10/17/16   Current Visit: No   Status: Resolved   


Plan: 


Likely secondary to non taking insulin; Resolved. 


- off of insulin drip, on sub cutaneous long acting insulin now. Tolerating a 

diet. Clinically, feeling better. 


-Transfer out of the ICU 


Qualifiers: 


   Diabetes mellitus type: type 1   Diabetes mellitus complication detail: 

without coma   Qualified Code(s): E10.10 - Type 1 diabetes mellitus with 

ketoacidosis without coma   





(2) COPD (chronic obstructive pulmonary disease)


Onset Date: 06/14/17   Current Visit: No   Status: Chronic   


Qualifiers: 


   COPD type: chronic bronchitis   Chronic bronchitis type: unspecified   

Qualified Code(s): J42 - Unspecified chronic bronchitis   





(3) CAD (coronary artery disease)


Current Visit: No   Status: Chronic   


Qualifiers: 


   Coronary Disease-Associated Artery/Lesion type: unspecified vessel or lesion 

type   Native vs. transplanted heart: unspecified whether native or 

transplanted heart   Associated angina: angina presence unspecified   Qualified 

Code(s): I25.10 - Atherosclerotic heart disease of native coronary artery 

without angina pectoris   





(4) Diabetes mellitus


Onset Date: 06/14/17   Current Visit: No   Status: Chronic   


Plan: 


Continue monitor blood sugars and adjust insulin as needed.


Qualifiers: 


   Diabetes mellitus type: type 1   Diabetes mellitus complication status: with 

circulatory complication   Diabetes mellitus complication detail: with other 

circulatory complications   Qualified Code(s): E10.59 - Type 1 diabetes 

mellitus with other circulatory complications   





(5) History of above knee amputation


Current Visit: No   Status: Chronic   


Qualifiers: 


   Laterality: left   Qualified Code(s): Z89.612 - Acquired absence of left leg 

above knee   





(6) History of amputation below knee


Current Visit: No   Status: Chronic   


Qualifiers: 


   Laterality: right   Qualified Code(s): Z89.511 - Acquired absence of right 

leg below knee   





(7) Hyperlipidemia


Current Visit: No   Status: Chronic   


Qualifiers: 


   Hyperlipidemia type: unspecified   Qualified Code(s): E78.5 - Hyperlipidemia

, unspecified   





(8) Hypothyroidism


Current Visit: No   Status: Chronic   


Qualifiers: 


   Hypothyroidism type: unspecified   Qualified Code(s): E03.9 - Hypothyroidism

, unspecified   





(9) Pressure ulcer


Current Visit: No   Status: Chronic   


Plan: 


Off-load


Qualifiers: 


   Pressure injury location: sacral region   Pressure injury stage: stage 1   

Qualified Code(s): L89.151 - Pressure ulcer of sacral region, stage 1   





- Plan


DVT Prophylaxis: Lovenox


GI prophylaxis: none


Diet: Diabetic diet 





Plan: Transfer to the floor. Monitor blood sugars and adjust accordingly. 

Pending symptomatic improvement.  Possible discharge in the next 24-48 hrs of 

clinically stable.

## 2019-08-17 VITALS — TEMPERATURE: 97.1 F | DIASTOLIC BLOOD PRESSURE: 67 MMHG | SYSTOLIC BLOOD PRESSURE: 148 MMHG

## 2019-08-17 LAB
BUN BLD-MCNC: 7 MG/DL (ref 7–18)
GLUCOSE SERPLBLD-MCNC: 58 MG/DL (ref 74–106)
HCT VFR BLD CALC: 36.6 % (ref 39.6–49)
LYMPHOCYTES # SPEC AUTO: 1.2 K/UL (ref 0.7–4.9)
MAGNESIUM SERPL-MCNC: 1.8 MG/DL (ref 1.8–2.4)
PMV BLD: 9.3 FL (ref 7.6–11.3)
POTASSIUM SERPL-SCNC: 3.3 MMOL/L (ref 3.5–5.1)
RBC # BLD: 4.09 M/UL (ref 4.33–5.43)

## 2019-08-17 RX ADMIN — HUMAN INSULIN SCH: 100 INJECTION, SOLUTION SUBCUTANEOUS at 07:28

## 2019-08-17 RX ADMIN — METOPROLOL TARTRATE SCH MG: 25 TABLET ORAL at 07:56

## 2019-08-17 RX ADMIN — Medication SCH: at 09:00

## 2019-08-17 RX ADMIN — GUAIFENESIN SCH MG: 600 TABLET, EXTENDED RELEASE ORAL at 07:56

## 2019-08-17 RX ADMIN — TICAGRELOR SCH MG: 90 TABLET ORAL at 07:57

## 2019-08-17 RX ADMIN — POTASSIUM & SODIUM PHOSPHATES POWDER PACK 280-160-250 MG SCH PKT: 280-160-250 PACK at 06:54

## 2019-08-17 RX ADMIN — HUMAN INSULIN SCH: 100 INJECTION, SOLUTION SUBCUTANEOUS at 11:24

## 2019-08-17 RX ADMIN — POTASSIUM & SODIUM PHOSPHATES POWDER PACK 280-160-250 MG SCH: 280-160-250 PACK at 09:00

## 2019-08-17 RX ADMIN — POTASSIUM & SODIUM PHOSPHATES POWDER PACK 280-160-250 MG SCH PKT: 280-160-250 PACK at 07:56

## 2019-08-17 RX ADMIN — ENOXAPARIN SODIUM SCH MG: 40 INJECTION SUBCUTANEOUS at 07:57

## 2019-08-17 RX ADMIN — GABAPENTIN PRN MG: 300 CAPSULE ORAL at 03:27

## 2019-08-17 RX ADMIN — MORPHINE SULFATE PRN MG: 4 INJECTION, SOLUTION INTRAMUSCULAR; INTRAVENOUS at 04:05

## 2019-08-17 RX ADMIN — MORPHINE SULFATE PRN MG: 4 INJECTION, SOLUTION INTRAMUSCULAR; INTRAVENOUS at 07:55

## 2019-08-17 NOTE — P.DS
Admission Date: 08/15/19


Discharge Date: 08/17/19


Disposition: ROUTINE DISCHARGE


Discharge Condition: GOOD


Reason for Admission: DKA


Brief History of Present Illness: 





Patient admitted with DKA


Hospital Course: 





He did well during the course of the stay no complication of the time of 

discharge he was alert oriented responsive cooperative vital signs blood 

pressure was little elevated no complaints PUD ENT normal neck no obvious 

masses chest clear abdomen soft history of COPD oxygenation stable patient 

discharge to resume all his home medications including his insulin dose


Vital Signs/Physical Exam: 














Temp Pulse Resp BP Pulse Ox


 


 97.5 F   78   18   197/100 H  98 


 


 08/17/19 07:44  08/17/19 07:44  08/17/19 08:25  08/17/19 07:44  08/17/19 08:25








Laboratory Data at Discharge: 














WBC  5.9 K/uL (4.3-10.9)   08/17/19  05:25    


 


Hgb  12.2 g/dL (13.6-17.9)  L  08/17/19  05:25    


 


Hct  36.6 % (39.6-49.0)  L  08/17/19  05:25    


 


Plt Count  163 K/uL (152-406)   08/17/19  05:25    


 


Sodium  144 mmol/L (136-145)   08/17/19  05:25    


 


Potassium  3.3 mmol/L (3.5-5.1)  L  08/17/19  05:25    


 


BUN  7 mg/dL (7-18)   08/17/19  05:25    


 


Creatinine  0.70 mg/dL (0.55-1.3)   08/17/19  05:25    


 


Glucose  58 mg/dL ()  L  08/17/19  05:25    


 


Phosphorus  2.3 mg/dL (2.5-4.9)  L  08/17/19  05:25    


 


Magnesium  1.8 mg/dL (1.8-2.4)   08/17/19  05:25    


 


Total Bilirubin  1.5 mg/dL (0.2-1.0)  H  08/15/19  06:47    


 


AST  28 U/L (15-37)   08/15/19  06:47    


 


ALT  24 U/L (12-78)   08/15/19  06:47    


 


Alkaline Phosphatase  104 U/L ()   08/15/19  06:47    


 


Triglycerides  48 mg/dL (<150)   08/16/19  05:26    


 


Cholesterol  96 mg/dL (<200)   08/16/19  05:26    


 


HDL Cholesterol  49 mg/dL (40-60)   08/16/19  05:26    


 


Cholesterol/HDL Ratio  1.96   08/16/19  05:26    


 


Lipase  20 U/L ()  L  08/15/19  06:47    








Home Medications: 








Aspirin 81 mg PO DAILY 10/14/16 


Insulin Aspart [Novolog*] See Protocol SQ AC 10/14/16 


Simvastatin [Zocor*] 40 mg PO BEDTIME 10/14/16 


Ticagrelor [Brilinta*] 90 mg PO BID* 10/14/16 


Albuterol Inhaler [Ventolin Inhaler*] 2 puff IH Q6H PRN 10/19/16 


Brinzolamide/Brimonidine Tart [Simbrinza 1%-0.2% Eye Drops] 1 drop OP BID 10/19/

16 


Budesonide/Formoterol Fumarate [Symbicort 160-4.5 Mcg Inhaler] 2 puff IH BID 10/

19/16 


Tiotropium Bromide [Spiriva] 18 mcg IH DAILY 10/19/16 


predniSONE [Prednisone*] 5 mg PO DAILY #30 tab 10/21/16 


Insulin Glargine,Hum.rec.anlog [Lantus] 5 unit SQ DAILY WITH BREAKFAST 06/13/17 


Cyclopentolate 1% [Cyclogyl 1%*] 1 drops EACH EYE PRN PRN 08/15/19 


Levothyroxine [Synthroid*] 88 mcg PO [id number] 08/15/19 


Metoprolol Tartrate [Lopressor*] 25 mg PO BID 08/15/19 





Patient Discharge Instructions: Patient to resume all his home medications


Diet: ADA

## 2019-09-12 ENCOUNTER — HOSPITAL ENCOUNTER (INPATIENT)
Dept: HOSPITAL 97 - ER | Age: 64
LOS: 1 days | Discharge: HOME | DRG: 639 | End: 2019-09-13
Attending: HOSPITALIST | Admitting: HOSPITALIST
Payer: COMMERCIAL

## 2019-09-12 VITALS — BODY MASS INDEX: 20 KG/M2

## 2019-09-12 VITALS — OXYGEN SATURATION: 98 %

## 2019-09-12 DIAGNOSIS — E03.9: ICD-10-CM

## 2019-09-12 DIAGNOSIS — Z91.14: ICD-10-CM

## 2019-09-12 DIAGNOSIS — I25.10: ICD-10-CM

## 2019-09-12 DIAGNOSIS — J44.9: ICD-10-CM

## 2019-09-12 DIAGNOSIS — Z89.612: ICD-10-CM

## 2019-09-12 DIAGNOSIS — I25.2: ICD-10-CM

## 2019-09-12 DIAGNOSIS — Z89.511: ICD-10-CM

## 2019-09-12 DIAGNOSIS — E10.10: Primary | ICD-10-CM

## 2019-09-12 DIAGNOSIS — Z95.5: ICD-10-CM

## 2019-09-12 LAB
ALBUMIN SERPL BCP-MCNC: 3 G/DL (ref 3.4–5)
ALP SERPL-CCNC: 115 U/L (ref 45–117)
ALT SERPL W P-5'-P-CCNC: 19 U/L (ref 12–78)
AST SERPL W P-5'-P-CCNC: 19 U/L (ref 15–37)
BUN BLD-MCNC: 26 MG/DL (ref 7–18)
BUN BLD-MCNC: 30 MG/DL (ref 7–18)
BUN BLD-MCNC: 34 MG/DL (ref 7–18)
COHGB MFR BLDA: 1 % (ref 0–1.5)
GLUCOSE SERPLBLD-MCNC: 134 MG/DL (ref 74–106)
GLUCOSE SERPLBLD-MCNC: 244 MG/DL (ref 74–106)
GLUCOSE SERPLBLD-MCNC: 351 MG/DL (ref 74–106)
HCT VFR BLD CALC: 40.8 % (ref 39.6–49)
INR BLD: 1.06
LYMPHOCYTES # SPEC AUTO: 1.5 K/UL (ref 0.7–4.9)
MAGNESIUM SERPL-MCNC: 1.9 MG/DL (ref 1.8–2.4)
NT-PROBNP SERPL-MCNC: 276 PG/ML (ref ?–125)
OXYHGB MFR BLDA: 94.1 % (ref 94–97)
PMV BLD: 9.9 FL (ref 7.6–11.3)
POTASSIUM SERPL-SCNC: 3.7 MMOL/L (ref 3.5–5.1)
POTASSIUM SERPL-SCNC: 3.8 MMOL/L (ref 3.5–5.1)
POTASSIUM SERPL-SCNC: 4.8 MMOL/L (ref 3.5–5.1)
RBC # BLD: 4.43 M/UL (ref 4.33–5.43)
SAO2 % BLDA: 95.8 % (ref 92–98.5)
TROPONIN (EMERG DEPT USE ONLY): < 0.02 NG/ML (ref 0–0.04)
UA COMPLETE W REFLEX CULTURE PNL UR: (no result)

## 2019-09-12 PROCEDURE — 82805 BLOOD GASES W/O2 SATURATION: CPT

## 2019-09-12 PROCEDURE — 82962 GLUCOSE BLOOD TEST: CPT

## 2019-09-12 PROCEDURE — 71045 X-RAY EXAM CHEST 1 VIEW: CPT

## 2019-09-12 PROCEDURE — 84100 ASSAY OF PHOSPHORUS: CPT

## 2019-09-12 PROCEDURE — 99285 EMERGENCY DEPT VISIT HI MDM: CPT

## 2019-09-12 PROCEDURE — 96365 THER/PROPH/DIAG IV INF INIT: CPT

## 2019-09-12 PROCEDURE — 84145 PROCALCITONIN (PCT): CPT

## 2019-09-12 PROCEDURE — 85610 PROTHROMBIN TIME: CPT

## 2019-09-12 PROCEDURE — 84484 ASSAY OF TROPONIN QUANT: CPT

## 2019-09-12 PROCEDURE — 85025 COMPLETE CBC W/AUTO DIFF WBC: CPT

## 2019-09-12 PROCEDURE — 96361 HYDRATE IV INFUSION ADD-ON: CPT

## 2019-09-12 PROCEDURE — 81003 URINALYSIS AUTO W/O SCOPE: CPT

## 2019-09-12 PROCEDURE — 82010 KETONE BODYS QUAN: CPT

## 2019-09-12 PROCEDURE — 81015 MICROSCOPIC EXAM OF URINE: CPT

## 2019-09-12 PROCEDURE — 83735 ASSAY OF MAGNESIUM: CPT

## 2019-09-12 PROCEDURE — 80076 HEPATIC FUNCTION PANEL: CPT

## 2019-09-12 PROCEDURE — 80048 BASIC METABOLIC PNL TOTAL CA: CPT

## 2019-09-12 PROCEDURE — 87040 BLOOD CULTURE FOR BACTERIA: CPT

## 2019-09-12 PROCEDURE — 93005 ELECTROCARDIOGRAM TRACING: CPT

## 2019-09-12 PROCEDURE — 83605 ASSAY OF LACTIC ACID: CPT

## 2019-09-12 PROCEDURE — 96366 THER/PROPH/DIAG IV INF ADDON: CPT

## 2019-09-12 PROCEDURE — 83880 ASSAY OF NATRIURETIC PEPTIDE: CPT

## 2019-09-12 PROCEDURE — 36415 COLL VENOUS BLD VENIPUNCTURE: CPT

## 2019-09-12 RX ADMIN — DEXTROSE, SODIUM CHLORIDE, AND POTASSIUM CHLORIDE SCH: 5; .45; .15 INJECTION INTRAVENOUS at 20:10

## 2019-09-12 RX ADMIN — DEXTROSE AND SODIUM CHLORIDE SCH: 5; .9 INJECTION, SOLUTION INTRAVENOUS at 20:10

## 2019-09-12 RX ADMIN — HEPARIN SODIUM SCH UNIT: 5000 INJECTION, SOLUTION INTRAVENOUS; SUBCUTANEOUS at 21:24

## 2019-09-12 NOTE — RAD REPORT
EXAM DESCRIPTION:  [facility] Single View9/12/2019 11:07 am

 

CLINICAL HISTORY:  Chest pain

 

COMPARISON:  August 2019

 

FINDINGS:  The lungs are hyperaerated.

 

Lungs appear clear of acute infiltrate

 

The heart is normal size

 

IMPRESSION:   No acute abnormalities displayed

## 2019-09-12 NOTE — XMS REPORT
Patient Summary Document

 Created on:2019



Patient:HEIDI MCCLAIN

Sex:Male

:1955

External Reference #:[id number]





Demographics







 Address  [address] [place] [address]

 

 Home Phone  [phone]

 

 Email Address  NONE

 

 Preferred Language  Unknown

 

 Marital Status  Unknown

 

 Buddhist Affiliation  Unknown

 

 Race  Unknown

 

 Additional Race(s)  Unavailable

 

 Ethnic Group  Unknown









Author







 Organization  [facility]

 

 Address  [address]



   [place] [address]

 

 Phone  [phone]









Care Team Providers







 Name  Role  Phone

 

 LOVELY CORDON  Unavailable  Unavailable









Problems

This patient has no known problems.



Allergies, Adverse Reactions, Alerts

This patient has no known allergies or adverse reactions.



Medications

This patient has no known medications.



Results







 Test Description  Test Time  Test Comments  Text Results  Atomic Results  
Result Comments









 POCT-GLUCOSE METER  2017 11:26:00    









   Test Item  Value  Reference Range  Comments









 POC-GLUCOSE METER (BEAKER) (test  268 mg/dL    TESTED AT [facility] [address] 
[facility]



 tznu=[id number])      [facility] [address]



POCT-GLUCOSE [id number] 07:49:00





 Test Item  Value  Reference Range  Comments

 

 POC-GLUCOSE METER (BEAKER)  219 mg/dL    TESTED AT [facility] [address] [facility]



 (test ggdw=3521)      [facility] [address]



BASIC METABOLIC [id number] 07:26:00





 Test Item  Value  Reference Range  Comments

 

 SODIUM (BEAKER) (test  134 meq/L  136-145  



 hlbq=843)      

 

 POTASSIUM (BEAKER) (test  3.6 meq/L  3.5-5.1  



 code=379)      

 

 CHLORIDE (BEAKER) (test  103 meq/L    



 code=382)      

 

 CO2 (BEAKER) (test  23 meq/L  22-29  



 code=355)      

 

 BLOOD UREA NITROGEN  21 mg/dL  7-21  



 (BEAKER) (test code=354)      

 

 CREATININE (BEAKER) (test  1.23 mg/dL  0.57-1.25  



 code=358)      

 

 GLUCOSE RANDOM (BEAKER)  212 mg/dL    



 (test code=652)      

 

 CALCIUM (BEAKER) (test  9.0 mg/dL  8.4-10.2  



 code=697)      

 

 EGFR (BEAKER) (test  60 mL/min/1.73 sq m    ESTIMATED GFR IS NOT AS



 code=1092)      ACCURATE AS CREATININE



       CLEARANCE IN PREDICTING



       GLOMERULAR FILTRATION



       RATE. ESTIMATED GFR IS



       NOT APPLICABLE FOR



       DIALYSIS PATIENTS.



CBC W/PLT COUNT &amp; AUTO [id number] 06:02:00





 Test Item  Value  Reference Range  Comments

 

 WHITE BLOOD CELL COUNT (BEAKER) (test code=775)  6.5 K/ L  4.0-10.0  

 

 RED BLOOD CELL COUNT (BEAKER) (test code=761)  3.90 M/ L  4.20-5.80  

 

 HEMOGLOBIN (BEAKER) (test code=410)  12.0 GM/DL  13.0-16.8  

 

 HEMATOCRIT (BEAKER) (test code=411)  36.0 %  40.0-50.0  

 

 MEAN CORPUSCULAR VOLUME (BEAKER) (test code=753)  92.2 fL  82.0-98.0  

 

 MEAN CORPUSCULAR HEMOGLOBIN (BEAKER) (test  30.8 pg  27.0-33.0  



 code=751)      

 

 MEAN CORPUSCULAR HEMOGLOBIN CONC (BEAKER) (test  33.4 GM/DL  32.0-36.0  



 code=752)      

 

 RED CELL DISTRIBUTION WIDTH (BEAKER) (test  12.2 %  10.3-14.2  



 code=412)      

 

 PLATELET COUNT (BEAKER) (test code=756)  187 K/CU MM  150-430  

 

 MEAN PLATELET VOLUME (BEAKER) (test code=754)  8.0 fL  6.5-10.5  

 

 NUCLEATED RED BLOOD CELLS (BEAKER) (test  0 /100 WBC  0-0  



 code=413)      

 

 NEUTROPHILS RELATIVE PERCENT (BEAKER) (test  58 %    



 code=429)      

 

 LYMPHOCYTES RELATIVE PERCENT (BEAKER) (test  23 %    



 code=430)      

 

 MONOCYTES RELATIVE PERCENT (BEAKER) (test  11 %    



 code=431)      

 

 EOSINOPHILS RELATIVE PERCENT (BEAKER) (test  7 %    



 code=432)      

 

 BASOPHILS RELATIVE PERCENT (BEAKER) (test  1 %    



 code=437)      

 

 NEUTROPHILS ABSOLUTE COUNT (BEAKER) (test  3.78 K/ L  1.80-8.00  



 code=670)      

 

 LYMPHOCYTES ABSOLUTE COUNT (BEAKER) (test  1.52 K/ L  1.48-4.50  



 code=414)      

 

 MONOCYTES ABSOLUTE COUNT (BEAKER) (test  0.73 K/ L  0.00-1.30  



 code=415)      

 

 EOSINOPHILS ABSOLUTE COUNT (BEAKER) (test  0.48 K/ L  0.00-0.50  



 code=416)      

 

 BASOPHILS ABSOLUTE COUNT (BEAKER) (test  0.04 K/ L  0.00-0.20  



 code=417)      



0.00POCT-GLUCOSE METER2017-06-15 23:23:00





 Test Item  Value  Reference Range  Comments

 

 POC-GLUCOSE METER (BEAKER)  339 mg/dL    TESTED AT [address]



 (test hfdt=1852)      [facility] [address]



POCT-GLUCOSE METER2017-06-15 19:55:00





 Test Item  Value  Reference Range  Comments

 

 POC-GLUCOSE METER (BEAKER)  366 mg/dL    Will Repeat Test/TESTED AT



 (test code=1538)      [address]



       [address]



POCT-GLUCOSE METER2017-06-15 18:14:00





 Test Item  Value  Reference Range  Comments

 

 POC-GLUCOSE METER (BEAKER)  410 mg/dL    TESTED AT [address]



 (test xzjb=9436)      [facility] [address]



POCT-GLUCOSE METER2017-06-15 16:43:00





 Test Item  Value  Reference Range  Comments

 

 POC-GLUCOSE METER (BEAKER)  403 mg/dL    Notified RN MD/TESTED AT [facility]



 (test code=1538)      [address] [place] [address]



CBC W/PLT COUNT &amp; AUTO DIFFERENTIAL2017-06-15 11:17:00





 Test Item  Value  Reference Range  Comments

 

 WHITE BLOOD CELL COUNT (BEAKER) (test code=775)  8.5 K/ L  4.0-10.0  

 

 RED BLOOD CELL COUNT (BEAKER) (test code=761)  4.34 M/ L  4.20-5.80  

 

 HEMOGLOBIN (BEAKER) (test code=410)  13.1 GM/DL  13.0-16.8  

 

 HEMATOCRIT (BEAKER) (test code=411)  40.4 %  40.0-50.0  

 

 MEAN CORPUSCULAR VOLUME (BEAKER) (test code=753)  93.1 fL  82.0-98.0  

 

 MEAN CORPUSCULAR HEMOGLOBIN (BEAKER) (test  30.1 pg  27.0-33.0  



 code=751)      

 

 MEAN CORPUSCULAR HEMOGLOBIN CONC (BEAKER) (test  32.3 GM/DL  32.0-36.0  



 code=752)      

 

 RED CELL DISTRIBUTION WIDTH (BEAKER) (test  13.4 %  10.3-14.2  



 code=412)      

 

 PLATELET COUNT (BEAKER) (test code=756)  190 K/CU MM  150-430  

 

 MEAN PLATELET VOLUME (BEAKER) (test code=754)  8.3 fL  6.5-10.5  

 

 NUCLEATED RED BLOOD CELLS (BEAKER) (test  0 /100 WBC  0-0  



 code=413)      

 

 NEUTROPHILS RELATIVE PERCENT (BEAKER) (test  68 %    



 code=429)      

 

 LYMPHOCYTES RELATIVE PERCENT (BEAKER) (test  18 %    



 code=430)      

 

 MONOCYTES RELATIVE PERCENT (BEAKER) (test  7 %    



 code=431)      

 

 EOSINOPHILS RELATIVE PERCENT (BEAKER) (test  5 %    



 code=432)      

 

 BASOPHILS RELATIVE PERCENT (BEAKER) (test  1 %    



 code=437)      

 

 NEUTROPHILS ABSOLUTE COUNT (BEAKER) (test  5.84 K/ L  1.80-8.00  



 code=670)      

 

 LYMPHOCYTES ABSOLUTE COUNT (BEAKER) (test  1.57 K/ L  1.48-4.50  



 code=414)      

 

 MONOCYTES ABSOLUTE COUNT (BEAKER) (test  0.62 K/ L  0.00-1.30  



 code=415)      

 

 EOSINOPHILS ABSOLUTE COUNT (BEAKER) (test  0.44 K/ L  0.00-0.50  



 code=416)      

 

 BASOPHILS ABSOLUTE COUNT (BEAKER) (test  0.06 K/ L  0.00-0.20  



 code=417)      



0.00POCT-GLUCOSE METER2017-06-15 08:34:00





 Test Item  Value  Reference Range  Comments

 

 POC-GLUCOSE METER (BEAKER)  293 mg/dL    TESTED AT [facility] [address] [facility]



 (test xgol=4541)      [facility] [address]



CBC W/PLT COUNT &amp; AUTO DIFFERENTIAL2017-06-15 07:38:00





 Test Item  Value  Reference Range  Comments

 

 WHITE BLOOD CELL COUNT (BEAKER)   K/ L  4.0-10.0  clotThis is a corrected



 (test code=775)      result. Previous result was



       0.0 K/ L on 6/15/2017 at



       0733 CDT

 

 RED BLOOD CELL COUNT (BEAKER)   M/ L  4.20-5.80  clotThis is a corrected



 (test code=761)      result. Previous result was



       2.30 M/ L on 6/15/2017 at



       0733 CDT

 

 HEMOGLOBIN (BEAKER) (test   GM/DL  13.0-16.8  clotThis is a corrected



 code=410)      result. Previous result was



       9.8 GM/DL on 6/15/2017 at



       [address] [place], [place] [address]T

 

 HEMATOCRIT (BEAKER) (test   %  40.0-50.0  clotThis is a corrected



 code=411)      result. Previous result was



       21.4 % on 6/15/2017 at [address] [place], [place] [address]

 

 MEAN CORPUSCULAR VOLUME   fL  82.0-98.0  clotThis is a corrected



 (BEAKER) (test code=753)      result. Previous result was



       92.8 fL on 6/15/2017 at [address] [place], [place] [address]

 

 MEAN CORPUSCULAR HEMOGLOBIN   pg  27.0-33.0  clotThis is a corrected



 (BEAKER) (test code=751)      result. Previous result was



       42.5 pg on 6/15/2017 at [address] [place], [place] [address]

 

 MEAN CORPUSCULAR HEMOGLOBIN   GM/DL  32.0-36.0  clotThis is a corrected



 CONC (BEAKER) (test code=752)      result. Previous result was



       45.8 GM/DL on 6/15/2017 at



       [address] [place], [place] [address]

 

 RED CELL DISTRIBUTION WIDTH   %  10.3-14.2  clotThis is a corrected



 (BEAKER) (test code=412)      result. Previous result was



       12.1 % on 6/15/2017 at [address] [place], [place] [address]

 

 PLATELET COUNT (BEAKER) (test   K/CU MM  150-430  clotThis is a corrected



 code=756)      result. Previous result was



       168 K/CU MM on 6/15/2017 at



       [address] [place], [place] [address]

 

 MEAN PLATELET VOLUME (BEAKER)   fL  6.5-10.5  clotThis is a corrected



 (test code=754)      result. Previous result was



       8.1 fL on 6/15/2017 at [address] [place], [place] [address]

 

 NUCLEATED RED BLOOD CELLS   /100 WBC  0-0  This is a corrected result.



 (BEAKER) (test code=413)      Previous result was 0 /100



       WBC on 6/15/2017 at [address] [place], [place] [address]T



[id number] METABOLIC PANEL2017-06-15 06:27:00





 Test Item  Value  Reference Range  Comments

 

 SODIUM (BEAKER) (test  135 meq/L  136-145  



 bjsk=882)      

 

 POTASSIUM (BEAKER) (test  4.6 meq/L  3.5-5.1  Specimen slightly



 code=379)      hemolyzed

 

 CHLORIDE (BEAKER) (test  106 meq/L    



 code=382)      

 

 CO2 (BEAKER) (test  17 meq/L  22-29  



 code=355)      

 

 BLOOD UREA NITROGEN  20 mg/dL  7-21  



 (BEAKER) (test code=354)      

 

 CREATININE (BEAKER) (test  0.94 mg/dL  0.57-1.25  Specimen slightly



 code=358)      hemolyzed

 

 GLUCOSE RANDOM (BEAKER)  237 mg/dL    



 (test code=652)      

 

 CALCIUM (BEAKER) (test  9.1 mg/dL  8.4-10.2  



 code=697)      

 

 EGFR (BEAKER) (test  82 mL/min/1.73 sq m    ESTIMATED GFR IS NOT AS



 code=1092)      ACCURATE AS CREATININE



       CLEARANCE IN PREDICTING



       GLOMERULAR FILTRATION



       RATE. ESTIMATED GFR IS



       NOT APPLICABLE FOR



       DIALYSIS PATIENTS.



POCT-GLUCOSE [id number] 21:00:00





 Test Item  Value  Reference Range  Comments

 

 POC-GLUCOSE METER (BEAKER)  203 mg/dL    TESTED AT [facility] [address]



 (test ptim=7924)      [facility] [address]

## 2019-09-12 NOTE — EKG
Test Date:    2019-09-12               Test Time:    09:54:16

Technician:   LISS                                     

                                                     

MEASUREMENT RESULTS:                                       

Intervals:                                           

Rate:         101                                    

TN:           146                                    

QRSD:         102                                    

QT:           384                                    

QTc:          497                                    

Axis:                                                

P:            85                                     

TN:           146                                    

QRS:          244                                    

T:            80                                     

                                                     

INTERPRETIVE STATEMENTS:                                       

                                                     

Sinus tachycardia

Right atrial enlargement

Right superior axis deviation

Pulmonary disease pattern

Abnormal ECG

Compared to ECG 11/20/2017 10:57:42

Atrial abnormality now present

Right superior axis now present

Sinus rhythm no longer present

Left-axis deviation no longer present

ST (T wave) deviation no longer present

Prolonged QT interval no longer present



Electronically Signed On 09-12-19 11:05:47 CDT by Rupert Ortega

## 2019-09-12 NOTE — EDPHYS
Physician Documentation                                                                           

 [facility]                                                                 

Name: Aj Morocho                                                                                 

Age: 63 yrs                                                                                       

Sex: Male                                                                                         

: 1955                                                                                   

MRN: [id number]                                                                                   

Arrival Date: 2019                                                                          

Time: 09:31                                                                                       

Account#: [id number]                                                                            

Bed 6                                                                                             

Private MD:                                                                                       

ED Physician Sheldon Dougherty                                                                    

HPI:                                                                                              

                                                                                             

09:50 This 63 yrs old  Male presents to ER via EMS with complaints of General        cp  

      Weakness.                                                                                   

09:50 general weakness.                                                                       cp  

                                                                                                  

Historical:                                                                                       

- Allergies:                                                                                      

09:42 NKA;                                                                                    jl7 

- Home Meds:                                                                                      

09:42 aspirin 81 mg Oral TbEC 1 tab once daily [Active]; BRILINTA 90 mg Oral tab 1 tab 2      jl7 

      times per day [Active]; insulin [Active]; levothyroxine 100 mcg tab 1 tab once daily        

      [Active]; metoprolol tartrate 25 mg Oral tab 1 tab 2 times per day [Active];                

      simvastatin 40 mg Oral tab 1 tab nightly [Active];                                          

- PMHx:                                                                                           

09:42 COPD; Diabetes - IDDM; High Cholesterol; Hypertension; Hypothyroidism; Myocardial       jl7 

      infarction;                                                                                 

- PSHx:                                                                                           

09:42 Heart stents; Left AKA; Right BKA;                                                      jl7 

                                                                                                  

- Immunization history:: Adult Immunizations up to date.                                          

- Ebola Screening: : No symptoms or risks identified at this time.                                

- Social history:: Smoking status: Patient/guardian denies using tobacco.                         

                                                                                                  

                                                                                                  

ROS:                                                                                              

09:55 Constitutional: Positive for poor PO intake, Negative for body aches, chills, fever.    cp  

09:55 Eyes: Negative for injury, pain, redness, and discharge.                                cp  

09:55 ENT: Negative for drainage from ear(s), ear pain, sore throat, difficulty swallowing,       

      difficulty handling secretions.                                                             

09:55 Cardiovascular: Negative for chest pain, edema, palpitations.                               

09:55 Respiratory: Negative for cough, shortness of breath, wheezing.                             

09:55 Abdomen/GI: Negative for abdominal pain, nausea, vomiting, and diarrhea, constipation,      

      black/tarry stool, rectal bleeding.                                                         

09:55 Back: Negative for pain at rest, pain with movement.                                        

09:55 Skin: Negative for cellulitis, rash.                                                        

09:55 Neuro: Positive for weakness, Negative for altered mental status, dizziness, headache.      

09:55 All other systems are negative.                                                             

                                                                                                  

Exam:                                                                                             

10:00 Constitutional: The patient appears in no acute distress, alert, awake,                 cp  

      non-diaphoretic, non-toxic, well developed, well nourished, obviously ill.                  

10:00 Head/Face:  Normocephalic, atraumatic.                                                  cp  

10:00 Eyes: Periorbital structures: appear normal, Pupils: equal, round, and reactive to          

      light and accomodation, Extraocular movements: intact throughout, Conjunctiva: normal,      

      no exudate, no injection, Sclera: no appreciated abnormality, Lids and lashes: appear       

      normal, bilaterally.                                                                        

10:00 ENT: External ear(s): are unremarkable, Ear canal(s): are normal, clear, TM's: bulging,     

      is not appreciated, bilaterally, dullness, bilaterally, erythema, is not appreciated,       

      bilaterally, Nose: is normal, Mouth: Lips: dry, Oral mucosa: dry, Posterior pharynx:        

      Airway: no evidence of obstruction, patent, Tonsils: are normal in appearance, Uvula:       

      normal, erythema, is not appreciated, exudate, is not appreciated.                          

10:00 Neck: ROM/movement: is normal, is supple, without pain, no range of motions                 

      limitations, no meningismus, no nuchal rigidity.                                            

10:00 Chest/axilla: Inspection: normal, Palpation: is normal, no crepitus, no tenderness.         

10:00 Cardiovascular: Rate: tachycardic, Rhythm: regular, JVD: is not appreciated.                

10:00 Respiratory: the patient does not display signs of respiratory distress,  Respirations:     

      normal, no use of accessory muscles, no retractions, no splinting, no tachypnea,            

      labored breathing, is not present, Breath sounds: are clear throughout, no decreased        

      breath sounds, no stridor, no wheezing.                                                     

10:05 ECG was reviewed by the Attending Physician.                                            cp  

                                                                                                  

Vital Signs:                                                                                      

09:42  / 52; Pulse 102; Resp 17 S; Temp 97.4(O); Pulse Ox 97% on R/A;                   jl7 

10:43 BP 91 / 37; Pulse 91; Resp 16 S; Pulse Ox 98% on R/A;                                   jl7 

12:00 BP 64 / 47; Pulse 102; Resp 16; Pulse Ox 97% ;                                          jl7 

12:06 Weight 40.82 kg (R);                                                                    jl7 

12:09 BP 95 / 27; Pulse 105; Resp 16 S; Pulse Ox 97% on R/A;                                  jl7 

12:36 Weight 46 kg (M);                                                                       jl7 

14:16  / 50; Pulse 107; Resp 19 S; Pulse Ox 100% on R/A;                                jl7 

15:00  / 50; Pulse 102; Resp 16 S; Pulse Ox 98% on R/A;                                 jl7 

16:30  / 44; Pulse 101; Resp 15 S; Pulse Ox 97% on R/A;                                 jl7 

18:01 BP 95 / 64; Pulse 101; Resp 16; Pulse Ox 97% ;                                          jl7 

19:30  / 51; Pulse 94; Resp 18; Pulse Ox 98% on R/A;                                      

                                                                                                  

MDM:                                                                                              

09:44 Patient medically screened.                                                               

12:14 Physician consultation: Justo Connell was called at 12:14, was contacted at 12:14,        

      regarding admission, to the ICU, patient's condition.                                       

12:15 Data reviewed: vital signs, nurses notes, lab test result(s), EKG, radiologic studies,  cp  

      plain films, I have discussed the patient's presentation/case with the attending            

      Emergency Department Physician; and as a result, I will admit patient.                      

                                                                                                  

                                                                                             

09:46 Order name: Basic Metabolic Panel; Complete Time: 11:55                                   

                                                                                             

12:06 Interpretation: Normal except: CO2 12; GLUC 351; BUN 34; CRE 1.65; GFR 42.                

                                                                                             

09:46 Order name: CBC with Diff; Complete Time: 11:55                                           

                                                                                             

09:46 Order name: LFT's; Complete Time: 11:55                                                   

                                                                                             

09:46 Order name: Magnesium; Complete Time: 11:55                                               

                                                                                             

09:46 Order name: NT PRO-BNP; Complete Time: 11:55                                              

                                                                                             

09:46 Order name: PT-INR; Complete Time: 11:55                                                  

                                                                                             

09:46 Order name: Troponin (emerg Dept Use Only); Complete Time: 11:55                          

                                                                                             

09:46 Order name: Procalcitonin; Complete Time: 11:55                                           

                                                                                             

09:46 Order name: Lactate; Complete Time: 11:55                                                 

                                                                                             

09:46 Order name: Blood Culture Adult (2)                                                       

                                                                                             

09:46 Order name: Urine Microscopic Only; Complete Time: 11:55                                  

09/12                                                                                             

11:00 Order name: Urine Dipstick--Ancillary (enter results); Complete Time: 11:55             bd  

                                                                                             

11:58 Order name: Ketone, Serum; Complete Time: 17:58                                         cp  

                                                                                             

11:58 Order name: ABG; Complete Time: 17:58                                                   cp  

12                                                                                             

09:46 Order name: XRAY Chest (1 view); Complete Time: 11:55                                   cp  

                                                                                             

09:46 Order name: EKG; Complete Time: 09:47                                                   cp  

                                                                                             

09:46 Order name: Cardiac monitoring; Complete Time: 10:34                                      

12                                                                                             

12:49 Order name: Glucose, Ancillary Testing; Complete Time: 17:58                            [facility]

                                                                                             

14:14 Order name: Basic Metabolic Panel; Complete Time: 17:58                                 [facility] 

                                                                                             

18:39 Order name: BMP                                                                         [facility] 

                                                                                             

20:12 Order name: CONS Diabetic Education Consul                                              [facility]

                                                                                             

20:12 Order name: CONS Pharmacy Consult                                                       [facility]

                                                                                             

20:12 Order name: NPO                                                                         [facility]

                                                                                             

20:12 Order name: Basic Metabolic Panel                                                       [facility]

                                                                                             

20:12 Order name: Basic Metabolic Panel                                                       [facility]

                                                                                             

20:12 Order name: Basic Metabolic Panel                                                       [facility]

                                                                                             

09:46 Order name: EKG - Nurse/Tech; Complete Time: 10:34                                        

                                                                                             

09:46 Order name: IV Saline Lock; Complete Time: 10:34                                          

                                                                                             

09:46 Order name: Labs collected and sent; Complete Time: 10:34                                 

                                                                                             

09:46 Order name: O2 Per Protocol; Complete Time: 10:34                                         

                                                                                             

09:46 Order name: O2 Sat Monitoring; Complete Time: 10:34                                       

                                                                                             

09:46 Order name: Accucheck Blood Glucose; Complete Time: 10:37                                 

                                                                                             

09:46 Order name: Urine Dipstick-Ancillary (obtain specimen); Complete Time: 10:37              

                                                                                             

10:45 Order name: Labs - recollect needed: All labs; Complete Time: 12:10                     ss  

                                                                                                  

ECG:                                                                                              

10:05 Rate is 101 beats/min. Rhythm is regular. WI interval is normal. QRS interval is        cp  

      prolonged at 102 msec. QT interval is normal. T waves are Inverted in lead aVR.             

      Interpreted by me. Reviewed by me.                                                          

                                                                                                  

Administered Medications:                                                                         

10:28 Drug: NS 0.9% 1000 ml Route: IV; Rate: 1 bolus; Site: right wrist;                        

11:45 Follow up: Response: No adverse reaction; IV Status: Completed infusion; IV Intake:     jl7 

      1000ml                                                                                      

12:25 Drug: NS 0.9% 1000 ml Route: IV; Rate: 1 bolus; Site: right hand;                       jl7 

13:45 Follow up: IV Status: Completed infusion; IV Intake: 1000ml                             [facility] 

12:30 Drug: Insulin Drip - (Insulin Regular Human 100 units, NS 0.9% 100 ml) {Co-Signature:   [facility] 

      bp (Joaquin Keen RN).} Route: IV; Rate: calculated rate; Site: right hand;                 

14:05 Follow up: Rate change 2 units/hr                                                       jl7 

18:40 Follow up: IV Status: IV converted to saline lock; Order to discontinue infusion        jl7 

19:05 Drug: D5-1/2 NS with KCl 20 mEq/L 1000 ml Route: IV; Rate: 150 ml/hr; Site: right wrist;jl7 

20:36 Follow up: Response: No adverse reaction; IV Status: Infusion continued upon admission    

                                                                                                  

                                                                                                  

Point of Care Testing:                                                                            

      Blood Glucose:                                                                              

10:44 Blood Glucose: 363 mg/dL;                                                               jl7 

13:57 Blood Glucose: 234 mg/dL;                                                               jl7 

16:42 Blood Glucose: 213 mg/dL;                                                               jl7 

18:21 Blood Glucose: 142 mg/dL;                                                               jl7 

19:30 Blood Glucose: 145 mg/dL;                                                                 

      Ranges:                                                                                     

      Critical Glucose Levels:Adult <50 mg/dl or >400 mg/dl  <40 mg/dl or >180 mg/dl       

Disposition:                                                                                      

19:11 Co-signature as Attending Physician, Sheldon Dougherty MD.                               ma2 

                                                                                                  

Disposition:                                                                                      

19 12:16 Hospitalization ordered by Justo Connell for Inpatient Admission. Preliminary     

  diagnosis is Diabetes mellitus due to underlying condition with ketoacidosis.                   

- Bed requested for Intensive Care Unit.                                                          

- Status is Inpatient Admission.                                                                

- Condition is Serious.                                                                           

- Problem is new.                                                                                 

- Symptoms are unchanged.                                                                         

UTI on Admission? No                                                                              

                                                                                                  

                                                                                                  

                                                                                                  

Signatures:                                                                                       

Dispatcher [employer]                           EDMS                                                 

Kathryn Humphries Shelby, RN RN ss Page, Corey, PA PA cp Leal, Jahala, RN RN   [facility]                                                  

Doe Morales                                                                                   

Joaquin Keen RN RN bp Alzahri, Mohammad, MD                   MD   ma2                                                  

Joaquin Keen RN                             bp                                                   

                                                                                                  

Corrections: (The following items were deleted from the chart)                                    

18:33 12:16 Hospitalization Ordered by Justo Connell for Inpatient Admission. Preliminary     bd  

      diagnosis is Diabetes mellitus due to underlying condition with ketoacidosis. Bed           

      requested for Intensive Care Unit. Status is Inpatient Admission. Condition is Serious.     

      Problem is new. Symptoms are unchanged. UTI on Admission? No. cp                            

20:37 18:33 2019 12:16 Hospitalization Ordered by Justo Connell for Inpatient           wh  

      Admission. Preliminary diagnosis is Diabetes mellitus due to underlying condition with      

      ketoacidosis. Bed requested for Intensive Care Unit. Status is Inpatient Admission.         

      Condition is Serious. Problem is new. Symptoms are unchanged. UTI on Admission? No. bd      

                                                                                                  

**************************************************************************************************

## 2019-09-12 NOTE — P.HP
Certification for Inpatient


Patient admitted to: Inpatient


With expected LOS: >2 Midnights


Practitioner: I am a practitioner with admitting privileges, knowledge of 

patient current condition, hospital course, and medical plan of care.


Services: Services provided to patient in accordance with Admission 

requirements found in Title 42 Section 412.3 of the Code of Federal Regulations





Patient History


Date of Service: 09/12/19


Reason for admission: Generalize weakness and vomiting


History of Present Illness: 


63-year-old gentleman with a history of diabetes mellitus, noncompliant with 

medications including insulin presented emergency department with a complaint 

of generalized weakness.  He states that he vomited once.  He denied fever.


In the emergency department his fingerstick glucose was in the 300s and BMP 

suggest patient has DKA with a high anionic gap.  His urine is also positive 

for ketones.  Patient admitted to not giving himself insulin over the past 1 

week.  He said bilateral leg amputee and stated he does not have access to food 

or cannot reaches insulin unless there is a family member available.  He was 

hospitalized last month for similar reason.  DKA protocol initiated in the ED.  

Patient is admitted further management of DKA.





Allergies





No Known Drug Allergies Allergy (Verified 05/13/15 09:09)


 Unknown


No Known Allergies Allergy (Uncoded 09/14/16 00:28)


 Unknown





Home Medications: 








Aspirin 81 mg PO DAILY 10/14/16 


Insulin Aspart [Novolog*] See Protocol SQ AC 10/14/16 


Simvastatin [Zocor*] 40 mg PO BEDTIME 10/14/16 


Ticagrelor [Brilinta*] 90 mg PO BID* 10/14/16 


Albuterol Inhaler [Ventolin Inhaler*] 2 puff IH Q6H PRN 10/19/16 


Brinzolamide/Brimonidine Tart [Simbrinza 1%-0.2% Eye Drops] 1 drop OP BID 10/19/

16 


Budesonide/Formoterol Fumarate [Symbicort 160-4.5 Mcg Inhaler] 2 puff IH BID 10/

19/16 


Tiotropium Bromide [Spiriva] 18 mcg IH DAILY 10/19/16 


predniSONE [Prednisone*] 5 mg PO DAILY #30 tab 10/21/16 


Insulin Glargine,Hum.rec.anlog [Lantus] 5 unit SQ DAILY WITH BREAKFAST 06/13/17 


Cyclopentolate 1% [Cyclogyl 1%*] 1 drops EACH EYE PRN PRN 08/15/19 


Levothyroxine [Synthroid*] 88 mcg PO [id number] 08/15/19 


Metoprolol Tartrate [Lopressor*] 25 mg PO BID 08/15/19 








- Past Medical/Surgical History


Diabetic: Yes


-: Hyperlipidemia


-: IDDM


-: Hypothyroidism


-: COPD


-: Left wrist fx 06/2014


-: Arthritis


-: PAD


-: DM


-: MI with 4 stents 2015


-: Cataract sx


-: Hemmorhoidectomy


-: L AKA 2005


-: Rt BKA





- Family History


  ** Mother


-: Diabetes





- Social History


Alcohol use: No


CD- Drugs: No


Caffeine use: Yes





Review of Systems


Lymphatics: Other


Other: 


General:  No fever, no malaise, no unintentional weight loss.


Eyes:  No eye discharge, 


Respiratory:  No cough, no shortness of breath.


CVS:  No chest pain, no palpitation, no lightheadedness.


GI:  No abdominal pain, no constipation, no diarrhea.


Genitourinary:  No dysuria, no urinary frequency, no incontinence, no hematuria.


Musculoskeletal:  No joint pains, or joint swelling, no gait instability.


Neurology:  No headache, no asymmetric, weakness, no problem with swallowing.





Except last documented, all other systems reviewed and negative.











Physical Examination





- Physical Exam


General: Alert, In no apparent distress, Oriented x3


HEENT: Atraumatic, Normocephalic, PERRLA, Mucous membr. moist/pink


Neck: Supple, JVD not distended, No Thyromegaly


Respiratory: Clear to auscultation bilaterally, Normal air movement


Cardiovascular: No edema, Normal pulses, Regular rate/rhythm, Normal S1 S2, No 

murmurs


Capillary refill: <2 Seconds


Gastrointestinal: Normal bowel sounds, Soft and benign, No tenderness


Musculoskeletal: Other (Right BKA and left AKA.)


Neurological: Normal strength at 5/5 x4 extr, Cranial nerves 3-12 intact


Lymphatics: No axilla or inguinal lymphadenopathy





- Studies


Laboratory Data (last 24 hrs)





09/12/19 11:04: PT 12.5, INR 1.06


09/12/19 11:04: WBC 9.1, Hgb 13.5 L, Hct 40.8, Plt Count 186


09/12/19 11:04: Sodium 139, Potassium 4.8, BUN 34 H, Creatinine 1.65 H, Glucose 

351 H, Magnesium 1.9, Total Bilirubin 0.9, AST 19, ALT 19, Alkaline Phosphatase 

115








Assessment and Plan





- Problems (Diagnosis)


(1) DKA (diabetic ketoacidoses)


Onset Date: 10/17/16   Current Visit: No   Status: Resolved   


Qualifiers: 


   Diabetes mellitus type: type 1   Diabetes mellitus complication detail: 

without coma   Qualified Code(s): E10.10 - Type 1 diabetes mellitus with 

ketoacidosis without coma   





(2) Hypothyroidism


Current Visit: No   Status: Chronic   


Qualifiers: 


   Hypothyroidism type: unspecified   Qualified Code(s): E03.9 - Hypothyroidism

, unspecified   





(3) CAD (coronary artery disease)


Current Visit: No   Status: Chronic   


Qualifiers: 


   Coronary Disease-Associated Artery/Lesion type: unspecified vessel or lesion 

type   Native vs. transplanted heart: unspecified whether native or 

transplanted heart   Associated angina: angina presence unspecified   Qualified 

Code(s): I25.10 - Atherosclerotic heart disease of native coronary artery 

without angina pectoris   





- Plan


Admit to ICU.


DKA protocol initiated with insulin drip and IV hydration


Fingerstick glucose monitoring.


Check TSH


Emphasize compliance to insulin and work out a scheduled with family to reduce 

hospitalizations for DKA.








Discharge Plan: Home





- Advance Directives


Does patient have a Living Will: No


Does patient have a Durable POA for Healthcare: Yes

## 2019-09-12 NOTE — XMS REPORT
Clinical Summary

 Created on:2019



Patient:Aj Morocho

Sex:Male

:1955

External Reference #:[id number]





Demographics







 Address  [address]



   [place] [address]

 

 Home Phone  [phone]

 

 Mobile Phone  [phone]

 

 Preferred Language  English

 

 Marital Status  Unknown

 

 Yarsanism Affiliation  Unknown

 

 Race  White

 

 Ethnic Group  Not  or 









Author







 Organization  [facility]

 

 Address  [address] [place] [address]









Support







 Name  Relationship  Address  Phone

 

 Oliva Morocho  Unavailable  Unavailable  [phone]

 

 Daniel Morocho  Unavailable  [phone]









Care Team Providers







 Name  Role  Phone

 

 Fernando  Primary Care Provider  [phone]









Allergies

No Known Allergies



Medications







 Medication  Sig  Dispensed  Refills  Start Date  End Date  Status

 

 aspirin 81 MG EC  Take 81 mg by mouth    0      Active



 tablet  daily.          

 

 gabapentin  Take 300 mg by mouth    0      Active



 (NEURONTIN) 300 MG  3 (three) times          



 capsule  daily.          

 

 levothyroxine  Take 100 mcg by    0      Active



 (SYNTHROID,  mouth Every morning          



 LEVOTHROID) 100 MCG  on an empty stomach.          



 tablet            

 

 simvastatin (ZOCOR)  Take 40 mg by mouth    0      Active



 40 MG tablet  nightly.          

 

 ticagrelor  Take by mouth 2    0      Active



 (BRILINTA) 90 mg Tab  (two) times daily.          



 tablet            

 

 albuterol HFA  Inhale by mouth via    0      Active



 (VENTOLIN HFA) 90  inhaler every 6          



 mcg/actuation  (six) hours as          



 inhaler  needed for Wheezing          



   .          

 

 budesonide-formotero  Inhale 2 puffs by    0      Active



 l (SYMBICORT)  mouth via inhaler 2          



 160-4.5  (two) times daily.          



 mcg/actuation            



 inhaler            

 

 brinzolamide (AZOPT)  Place 1 drop into    0      Active



 1 % ophthalmic  both eyes 3 (three)          



 suspension  times daily.          

 

 latanoprost  1 drop nightly.    0      Active



 (XALATAN) 0.005 %            



 ophthalmic solution            

 

 prednisoLONE acetate  1 drop 4 (four)    0      Active



 (PRED FORTE) 1 %  times daily.          



 ophthalmic            



 suspension            

 

 tiotropium (SPIRIVA)  Inhale 18 mcg by    0      Active



 18 mcg inhalation  mouth via inhaler          



 capsule  daily.          

 

 traMADol (ULTRAM) 50  Take 50 mg by mouth    0      Active



 mg tablet  every 6 (six) hours          



   as needed for Pain.          

 

 amoxicillin-clavulan  Take 1 tablet by    0      Active



 ate (AUGMENTIN)  mouth 2 (two) times          



 875-125 mg per  daily.          



 tablet            

 

 fluticasone  1 spray by Nasal    0      Active



 (FLONASE) 50  route 2 (two) times          



 mcg/actuation nasal  daily.          



 spray            

 

 montelukast  Take 10 mg by mouth    0      Active



 (SINGULAIR) 10 mg  nightly.          



 tablet            

 

 pantoprazole  Take 40 mg by mouth    0      Active



 (PROTONIX) 40 MG  daily.          



 tablet            

 

 predniSONE  Take 5 mg by mouth    0      Active



 (DELTASONE) 5 MG  daily.          



 tablet            

 

 insulin detemir  Inject 20 Units  10 mL  0  06/15/2017    Active



 (LEVEMIR) 100  subcutaneously          



 unit/mL injection  daily.          







Active Problems







 Problem  Noted Date

 

 Carotid stenosis  2017

 

 History of coronary artery disease  2017

 

 PVD (peripheral vascular disease)  2017

 

 COPD (chronic obstructive pulmonary disease)  2017

 

 DM (diabetes mellitus)  2017

 

 Hypothyroidism  2017

 

 History of below knee amputation, right: in 2017

 

 History of above knee amputation, left: in 2017

 

 Dizziness  06/15/2017

 

 TIA (transient ischemic attack)  2017







Family History







 Medical History  Relation  Name  Comments

 

 Heart disease  Father    









 Relation  Name  Status  Comments

 

 Father      

 

 Mother      







Social History







 Tobacco Use  Types  Packs/Day  Years Used  Date

 

 Former Smoker    1  46  Quit: 2012









 Alcohol Use  Drinks/Week  oz/Week  Comments

 

 No      









 Sex Assigned at Birth  Date Recorded

 

 Not on file  









 Job Start Date  Occupation  Industry

 

 Not on file  Not on file  Not on file









 Travel History  Travel Start  Travel End









 No recent travel history available.







Last Filed Vital Signs

Not on file



Plan of Treatment

Not on file



Results

Not on fileafter 2018



Insurance







 Payer  Benefit Plan / Group  Subscriber ID  Type  Phone  Address

 

 MEDICARE  MEDICARE A B  xxxxxxxxxx  Medicare    









 Guarantor Name  Account Type  Relation to  Date of  Phone  Billing Address



     Patient  Birth    

 

 Aj Morocho  Personal/Famil  Self  1955  [phone]  [place]      ([place] [address]







Advance Directives

For more information, please contact:[place] [address] [place], [place] [phone]





 Code Status  Date Activated  Date Inactivated  Comments

 

 Partial Code  2017 10:30 PM  2017  6:08 PM  









 This code status was determined by:  Patient  

 

 Drug Protocol After Arrest Occurs?  Yes  

 

 Mechanical Ventilation with Intubation?  No  

 

 Bag/Mask?  No  

 

 Internal/External Pacemaker?  No  

 

 Transfer to Critical Care?  No  

 

 Chest Compressions?  No  

 

 Defibrillation/Cardioversion?  No

## 2019-09-12 NOTE — ER
Nurse's Notes                                                                                     

 [facility]                                                                 

Name: Aj Morocho                                                                                 

Age: 63 yrs                                                                                       

Sex: Male                                                                                         

: 1955                                                                                   

MRN: [id number]                                                                                   

Arrival Date: 2019                                                                          

Time: 09:31                                                                                       

Account#: [id number]                                                                            

Bed 6                                                                                             

Private MD:                                                                                       

Diagnosis: Diabetes mellitus due to underlying condition with ketoacidosis                        

                                                                                                  

Presentation:                                                                                     

                                                                                             

09:31 Presenting complaint: EMS states: Been feeling bad for a week, worsened this morning,   jl7 

      C/O nausea, denies vomiting. Transition of care: patient was not received from another      

      setting of care. Onset of symptoms was 2019. Risk Assessment: Do you want     

      to hurt yourself or someone else? Patient reports no desire to harm self or others.         

      Initial Sepsis Screen: Does the patient meet any 2 criteria? RR > 20 per min. HR > 90       

      bpm. Yes Does the patient have a suspected source of infection? No. Patient's initial       

      sepsis screen is negative. Care prior to arrival: Medication(s) given: Normal saline        

      infusion, 500 mL, IV initiated. 20 GA, in the left forearm, Glucose check: 265.             

09:31 Method Of Arrival: EMS: [facility]                                                    7 

09:31 Acuity: OZZY 3                                                                           jl7 

                                                                                                  

Triage Assessment:                                                                                

09:42 General: Appears in no apparent distress. uncomfortable, ill, Behavior is calm,         jl7 

      cooperative, appropriate for age. Pain: Complains of pain in "All over.". Neuro: Level      

      of Consciousness is obeys commands, lethargic, Oriented to person, place, time,             

      situation. Cardiovascular: Denies chest pain, Heart tones present Patient's skin is         

      warm and dry. Respiratory: Airway is patent Respiratory effort is even, unlabored,          

      Respiratory pattern is regular, symmetrical, Breath sounds are clear bilaterally.           

      Denies shortness of breath. GI: Reports nausea, vomiting. : No signs and/or symptoms      

      were reported regarding the genitourinary system. Derm: Skin is dry, Skin is pale, Skin     

      temperature is cold. Musculoskeletal: No signs and/or symptoms reported regarding the       

      musculoskeletal system.                                                                     

                                                                                                  

Historical:                                                                                       

- Allergies:                                                                                      

09:42 NKA;                                                                                    jl7 

- Home Meds:                                                                                      

09:42 aspirin 81 mg Oral TbEC 1 tab once daily [Active]; BRILINTA 90 mg Oral tab 1 tab 2      jl7 

      times per day [Active]; insulin [Active]; levothyroxine 100 mcg tab 1 tab once daily        

      [Active]; metoprolol tartrate 25 mg Oral tab 1 tab 2 times per day [Active];                

      simvastatin 40 mg Oral tab 1 tab nightly [Active];                                          

- PMHx:                                                                                           

09:42 COPD; Diabetes - IDDM; High Cholesterol; Hypertension; Hypothyroidism; Myocardial       jl7 

      infarction;                                                                                 

- PSHx:                                                                                           

09:42 Heart stents; Left AKA; Right BKA;                                                      jl7 

                                                                                                  

- Immunization history:: Adult Immunizations up to date.                                          

- Ebola Screening: : No symptoms or risks identified at this time.                                

- Social history:: Smoking status: Patient/guardian denies using tobacco.                         

                                                                                                  

                                                                                                  

Screenin:00 Abuse screen: Denies threats or abuse. Denies injuries from another. Nutritional        jl7 

      screening: No deficits noted. Tuberculosis screening: No symptoms or risk factors           

      identified. Fall Risk IV access (20 points). Total Beyrs Fall Scale indicates No Risk       

      (0-24 pts).                                                                                 

                                                                                                  

Assessment:                                                                                       

09:45 General: See triage assessment.                                                         jl7 

10:45 Reassessment: Patient appears in no apparent distress at this time. No changes from     jl7 

      previously documented assessment. Patient and/or family updated on plan of care and         

      expected duration. Pain level reassessed. Patient is alert, oriented x 3, equal             

      unlabored respirations, skin warm/dry/pink.                                                 

10:55 Reassessment: Lab coming to draw labs.                                                  jl7 

12:00 Reassessment: Patient appears in no apparent distress at this time. Patient and/or      [facility] 

      family updated on plan of care and expected duration. Pain level reassessed. Patient is     

      alert, oriented x 3, equal unlabored respirations, skin warm/dry/pink.                      

12:30 Reassessment: Insulin drip started at 4 units/hour per ESTELA Mejia.                  jl7 

12:57 Reassessment: Dr. Connell at bedside discussing plan of care.                            jl7 

14:00 Reassessment: VO to redraw basic and decrease Insulin drip to 2 units/hour per Dexter    ESTELA Shay.                                                                                   

14:39 Reassessment: WILLIAM Freeman attempted to draw repeat labs, unsuccessful, Lab notified and     [facility] 

      will redraw labs.                                                                           

15:30 Reassessment: Patient appears in no apparent distress at this time. No changes from     jl7 

      previously documented assessment. Patient and/or family updated on plan of care and         

      expected duration. Pain level reassessed. Patient is alert, oriented x 3, equal             

      unlabored respirations, skin warm/dry/pink. Patient states symptoms have not improved.      

16:30 Reassessment: Patient appears in no apparent distress at this time. No changes from     [facility] 

      previously documented assessment. Patient and/or family updated on plan of care and         

      expected duration. Pain level reassessed. Patient is alert, oriented x 3, equal             

      unlabored respirations, skin warm/dry/pink.                                                 

17:30 Reassessment: Patient appears in no apparent distress at this time. No changes from     [facility] 

      previously documented assessment. Patient and/or family updated on plan of care and         

      expected duration. Pain level reassessed. Patient is alert, oriented x 3, equal             

      unlabored respirations, skin warm/dry/pink.                                                 

18:37 Reassessment: Pt's BGL is 142, insulin infusion stopped per protocol. Attempted to call [facility] 

      Dr. Connell, no answer and his voicemail is full. ESTELA Mejia ordered to repeat BMP       

      at this time.                                                                               

18:56 Reassessment: Attempted to call report, nurse unavailable.                              [facility] 

19:41 Reassessment: Patient appears in no apparent distress at this time. No changes from       

      previously documented assessment. Patient and/or family updated on plan of care and         

      expected duration. Pain level reassessed. Patient is alert, oriented x 3, equal             

      unlabored respirations, skin warm/dry/pink. BS Rechecked 145.                               

                                                                                                  

Vital Signs:                                                                                      

09:42  / 52; Pulse 102; Resp 17 S; Temp 97.4(O); Pulse Ox 97% on R/A;                   jl7 

10:43 BP 91 / 37; Pulse 91; Resp 16 S; Pulse Ox 98% on R/A;                                   jl7 

12:00 BP 64 / 47; Pulse 102; Resp 16; Pulse Ox 97% ;                                          jl7 

12:06 Weight 40.82 kg (R);                                                                    7 

12:09 BP 95 / 27; Pulse 105; Resp 16 S; Pulse Ox 97% on R/A;                                  jl7 

12:36 Weight 46 kg (M);                                                                       jl7 

14:16  / 50; Pulse 107; Resp 19 S; Pulse Ox 100% on R/A;                                jl7 

15:00  / 50; Pulse 102; Resp 16 S; Pulse Ox 98% on R/A;                                 jl7 

16:30  / 44; Pulse 101; Resp 15 S; Pulse Ox 97% on R/A;                                 jl7 

18:01 BP 95 / 64; Pulse 101; Resp 16; Pulse Ox 97% ;                                          jl7 

19:30  / 51; Pulse 94; Resp 18; Pulse Ox 98% on R/A;                                      

                                                                                                  

ED Course:                                                                                        

09:31 Patient arrived in ED.                                                                  jl7 

09:39 Dexter Hunter PA is PHCP.                                                                cp  

09:39 Sheldon Dougherty MD is Attending Physician.                                           cp  

09:41 Triage completed.                                                                       jl7 

09:42 Arm band placed on right wrist.                                                         jl7 

09:45 Patient has correct armband on for positive identification. Placed in gown. Bed in low  jl7 

      position. Call light in reach. Side rails up X2. Cardiac monitor on. Pulse ox on. NIBP      

      on. Warm blanket given.                                                                     

09:57 Hussein, Jahala, RN is Primary Nurse.                                                      jl7 

09:58 EKG done, by EKG tech. reviewed by Dexter PALMER.                                       at1 

10:28 Inserted saline lock: 22 gauge in right wrist, using aseptic technique. Blood collected.bp  

11:08 XRAY Chest (1 view) In Process Unspecified.                                             EDMS

12:14 Justo Connell is Hospitalizing Provider.                                               cp  

13:00 No provider procedures requiring assistance completed. Patient admitted, IV remains in  jl7 

      place. intact, No redness/swelling at site.                                                 

19:30 Primary Nurse role handed off by Mei Hussein RN                                       jl7 

19:31 Doe Morales is Primary Nurse.                                                           

                                                                                                  

Administered Medications:                                                                         

10:28 Drug: NS 0.9% 1000 ml Route: IV; Rate: 1 bolus; Site: right wrist;                      bp  

11:45 Follow up: Response: No adverse reaction; IV Status: Completed infusion; IV Intake:     jl7 

      1000ml                                                                                      

12:25 Drug: NS 0.9% 1000 ml Route: IV; Rate: 1 bolus; Site: right hand;                       jl7 

13:45 Follow up: IV Status: Completed infusion; IV Intake: 1000ml                             jl7 

12:30 Drug: Insulin Drip - (Insulin Regular Human 100 units, NS 0.9% 100 ml) {Co-Signature:   jl7 

      bp (Joaquin Keen RN).} Route: IV; Rate: calculated rate; Site: right hand;                 

14:05 Follow up: Rate change 2 units/hr                                                       jl7 

18:40 Follow up: IV Status: IV converted to saline lock; Order to discontinue infusion        jl7 

19:05 Drug: D5-1/2 NS with KCl 20 mEq/L 1000 ml Route: IV; Rate: 150 ml/hr; Site: right wrist;7 

20:36 Follow up: Response: No adverse reaction; IV Status: Infusion continued upon admission    

                                                                                                  

                                                                                                  

Point of Care Testing:                                                                            

      Blood Glucose:                                                                              

10:44 Blood Glucose: 363 mg/dL;                                                               jl7 

13:57 Blood Glucose: 234 mg/dL;                                                               jl7 

16:42 Blood Glucose: 213 mg/dL;                                                               jl7 

18:21 Blood Glucose: 142 mg/dL;                                                               jl7 

19:30 Blood Glucose: 145 mg/dL;                                                                 

      Ranges:                                                                                     

                                                                                                  

Intake:                                                                                           

11:45 IV: 1000ml; Total: 1000ml.                                                              jl7 

13:45 IV: 1000ml; Total: 2000ml.                                                              7 

                                                                                                  

Outcome:                                                                                          

12:16 Decision to Hospitalize by Provider.                                                    cp  

20:35 Admitted to ICU accompanied by nurse, via stretcher, room 7, on monitor, with chart,      

      Report called to  Quinn Chapin RN                                                          

20:35 Condition: good                                                                             

20:35 Instructed on the need for admit.                                                           

20:37 Patient left the ED.                                                                      

                                                                                                  

Signatures:                                                                                       

Dispatcher [employer]                           EDMS                                                 

Oliva Moore, EKG Tech              EKG Tat1                                                  

Dexter Hunter PA PA cp Leal, Jahala, RN                        RN   7                                                  

Doe Morales Brian, RN                      RN   bp                                                   

Joaquin Keen RN                             bp                                                   

                                                                                                  

Corrections: (The following items were deleted from the chart)                                    

18:03 13:30 IV Status: Completed infusion jl7                                                  

                                                                                                  

**************************************************************************************************

## 2019-09-13 VITALS — TEMPERATURE: 98.6 F | DIASTOLIC BLOOD PRESSURE: 91 MMHG | SYSTOLIC BLOOD PRESSURE: 120 MMHG

## 2019-09-13 LAB
BLD SMEAR INTERP: (no result)
BUN BLD-MCNC: 16 MG/DL (ref 7–18)
BUN BLD-MCNC: 20 MG/DL (ref 7–18)
GLUCOSE SERPLBLD-MCNC: 187 MG/DL (ref 74–106)
GLUCOSE SERPLBLD-MCNC: 280 MG/DL (ref 74–106)
HCT VFR BLD CALC: 37.1 % (ref 39.6–49)
LYMPHOCYTES # SPEC AUTO: 1.6 K/UL (ref 0.7–4.9)
MAGNESIUM SERPL-MCNC: 1.6 MG/DL (ref 1.8–2.4)
MORPHOLOGY BLD-IMP: (no result)
PMV BLD: 9.3 FL (ref 7.6–11.3)
POTASSIUM SERPL-SCNC: 3.3 MMOL/L (ref 3.5–5.1)
POTASSIUM SERPL-SCNC: 4.2 MMOL/L (ref 3.5–5.1)
RBC # BLD: 4.16 M/UL (ref 4.33–5.43)

## 2019-09-13 RX ADMIN — HUMAN INSULIN SCH UNIT: 100 INJECTION, SOLUTION SUBCUTANEOUS at 21:27

## 2019-09-13 RX ADMIN — DEXTROSE AND SODIUM CHLORIDE SCH: 5; .9 INJECTION, SOLUTION INTRAVENOUS at 08:19

## 2019-09-13 RX ADMIN — DEXTROSE AND SODIUM CHLORIDE SCH: 5; .9 INJECTION, SOLUTION INTRAVENOUS at 04:16

## 2019-09-13 RX ADMIN — HEPARIN SODIUM SCH UNIT: 5000 INJECTION, SOLUTION INTRAVENOUS; SUBCUTANEOUS at 08:22

## 2019-09-13 RX ADMIN — DEXTROSE AND SODIUM CHLORIDE SCH: 5; .9 INJECTION, SOLUTION INTRAVENOUS at 00:13

## 2019-09-13 RX ADMIN — HUMAN INSULIN SCH UNIT: 100 INJECTION, SOLUTION SUBCUTANEOUS at 17:20

## 2019-09-13 RX ADMIN — DEXTROSE, SODIUM CHLORIDE, AND POTASSIUM CHLORIDE SCH MLS: 5; .45; .15 INJECTION INTRAVENOUS at 02:00

## 2019-09-13 NOTE — P.DS
Admission Date: 09/12/19


Discharge Date: 09/13/19


Disposition: ROUTINE DISCHARGE


Discharge Condition: GOOD


Reason for Admission: Generalize weakness and vomiting





- Problems


(1) DKA (diabetic ketoacidoses)


Onset Date: 10/17/16   Current Visit: No   Status: Resolved   


Qualifiers: 


   Diabetes mellitus type: type 1   Diabetes mellitus complication detail: 

without coma   Qualified Code(s): E10.10 - Type 1 diabetes mellitus with 

ketoacidosis without coma   





(2) Hypothyroidism


Current Visit: No   Status: Chronic   


Qualifiers: 


   Hypothyroidism type: unspecified   Qualified Code(s): E03.9 - Hypothyroidism

, unspecified   





(3) CAD (coronary artery disease)


Current Visit: No   Status: Chronic   


Qualifiers: 


   Coronary Disease-Associated Artery/Lesion type: unspecified vessel or lesion 

type   Native vs. transplanted heart: unspecified whether native or 

transplanted heart   Associated angina: angina presence unspecified   Qualified 

Code(s): I25.10 - Atherosclerotic heart disease of native coronary artery 

without angina pectoris   


Brief History of Present Illness: 


63-year-old gentleman with a history of diabetes mellitus, noncompliant with 

medications including insulin presented emergency department with a complaint 

of generalized weakness.  He stated that he vomited once.  He denied fever.


In the emergency department his fingerstick glucose was in the 300s and BMP 

suggest patient has DKA with a high anionic gap.  His urine was also positive 

for ketones.  Patient admitted to not giving himself insulin over the past 1 

week.  He is a bilateral leg amputee and stated he does not have access to food 

or cannot reach his insulin unless there is a family member available.  He was 

hospitalized last month for similar reason.  DKA protocol initiated in the ED.  

Patient was admitted further management of DKA.





Hospital Course: 


Patient admitted to the ICU.  DKA protocol continued in ICU insulin drip and IV 

hydration. Anionic gap closed, DKA resolved,


Patient was transitioned to his home dose Lantus insulin.  He tolerated diet 

advanced.  His blood sugar was within acceptable range.  He is deemed 

clinically stable for discharge.  Compliance to insulin therapy was emphasized.


Vital Signs/Physical Exam: 














Temp Pulse Resp BP Pulse Ox


 


 97.4 F   91 H  25 H  130/66   99 


 


 09/13/19 04:00  09/13/19 12:00  09/13/19 12:00  09/13/19 12:00  09/13/19 12:00








General: Alert, In no apparent distress, Oriented x3


HEENT: Mucous membr. moist/pink


Neck: Supple


Respiratory: Clear to auscultation bilaterally, Normal air movement


Cardiovascular: No edema


Gastrointestinal: Normal bowel sounds, Soft and benign, No tenderness


Musculoskeletal: Other (Bilateral amputee)


Neurological: Normal strength at 5/5 x4 extr


Laboratory Data at Discharge: 














WBC  8.0 K/uL (4.3-10.9)   09/13/19  04:47    


 


Hgb  13.1 g/dL (13.6-17.9)  L  09/13/19  04:47    


 


Hct  37.1 % (39.6-49.0)  L  09/13/19  04:47    


 


Plt Count  167 K/uL (152-406)   09/13/19  04:47    


 


PT  12.5 SECONDS (9.5-12.5)   09/12/19  11:04    


 


INR  1.06   09/12/19  11:04    


 


Sodium  145 mmol/L (136-145)   09/13/19  04:47    


 


Potassium  3.3 mmol/L (3.5-5.1)  L  09/13/19  04:47    


 


BUN  16 mg/dL (7-18)   09/13/19  04:47    


 


Creatinine  1.05 mg/dL (0.55-1.3)   09/13/19  04:47    


 


Glucose  187 mg/dL ()  H  09/13/19  04:47    


 


Phosphorus  1.4 mg/dL (2.5-4.9)  L  09/13/19  04:47    


 


Magnesium  1.6 mg/dL (1.8-2.4)  L  09/13/19  04:47    


 


Total Bilirubin  0.9 mg/dL (0.2-1.0)   09/12/19  11:04    


 


AST  19 U/L (15-37)   09/12/19  11:04    


 


ALT  19 U/L (12-78)   09/12/19  11:04    


 


Alkaline Phosphatase  115 U/L ()   09/12/19  11:04    








Home Medications: 








Aspirin 81 mg PO DAILY 10/14/16 


Insulin Aspart [Novolog*] See Protocol SQ AC 10/14/16 


Simvastatin [Zocor*] 40 mg PO BEDTIME 10/14/16 


Albuterol Inhaler [Ventolin Inhaler*] 2 puff IH Q6H PRN 10/19/16 


Brinzolamide/Brimonidine Tart [Simbrinza 1%-0.2% Eye Drops] 1 drop OP BID 10/19/

16 


Budesonide/Formoterol Fumarate [Symbicort 160-4.5 Mcg Inhaler] 2 puff IH BID 10/

19/16 


Insulin Glargine,Hum.rec.anlog [Lantus] 5 unit SQ DAILY WITH BREAKFAST 06/13/17 


Cyclopentolate 1% [Cyclogyl 1%*] 1 drops EACH EYE PRN PRN 08/15/19 


Levothyroxine [Synthroid*] 88 mcg PO [id number] 08/15/19 





Diet: ADA


Activity: Ad shelley

## 2019-10-04 ENCOUNTER — HOSPITAL ENCOUNTER (INPATIENT)
Dept: HOSPITAL 97 - ER | Age: 64
LOS: 6 days | Discharge: HOME HEALTH | DRG: 177 | End: 2019-10-10
Attending: HOSPITALIST | Admitting: FAMILY MEDICINE
Payer: COMMERCIAL

## 2019-10-04 VITALS — BODY MASS INDEX: 18.3 KG/M2

## 2019-10-04 DIAGNOSIS — E78.2: ICD-10-CM

## 2019-10-04 DIAGNOSIS — Z89.512: ICD-10-CM

## 2019-10-04 DIAGNOSIS — S00.03XA: ICD-10-CM

## 2019-10-04 DIAGNOSIS — G93.41: ICD-10-CM
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
## 2019-10-04 DIAGNOSIS — I10: ICD-10-CM

## 2019-10-04 DIAGNOSIS — J44.0: ICD-10-CM

## 2019-10-04 DIAGNOSIS — E11.649: ICD-10-CM

## 2019-10-04 DIAGNOSIS — Z89.511: ICD-10-CM

## 2019-10-04 DIAGNOSIS — E03.9: ICD-10-CM

## 2019-10-04 DIAGNOSIS — J44.9: ICD-10-CM

## 2019-10-04 DIAGNOSIS — J69.0: Primary | ICD-10-CM

## 2019-10-04 LAB
ALBUMIN SERPL BCP-MCNC: 3.4 G/DL (ref 3.4–5)
ALP SERPL-CCNC: 113 U/L (ref 45–117)
ALT SERPL W P-5'-P-CCNC: 66 U/L (ref 12–78)
AST SERPL W P-5'-P-CCNC: 79 U/L (ref 15–37)
BUN BLD-MCNC: 19 MG/DL (ref 7–18)
COHGB MFR BLDA: 0.4 % (ref 0–1.5)
COHGB MFR BLDA: 0.9 % (ref 0–1.5)
GLUCOSE SERPLBLD-MCNC: 134 MG/DL (ref 74–106)
HCT VFR BLD CALC: 42.2 % (ref 39.6–49)
INR BLD: 0.95
LYMPHOCYTES # SPEC AUTO: 1.2 K/UL (ref 0.7–4.9)
MAGNESIUM SERPL-MCNC: 1.7 MG/DL (ref 1.8–2.4)
NT-PROBNP SERPL-MCNC: 328 PG/ML (ref ?–125)
OXYHGB MFR BLDA: 88 % (ref 94–97)
OXYHGB MFR BLDA: 93.2 % (ref 94–97)
PMV BLD: 9.5 FL (ref 7.6–11.3)
POTASSIUM SERPL-SCNC: 3.5 MMOL/L (ref 3.5–5.1)
RBC # BLD: 4.71 M/UL (ref 4.33–5.43)
SAO2 % BLDA: 89 % (ref 92–98.5)
SAO2 % BLDA: 94.7 % (ref 92–98.5)
TROPONIN (EMERG DEPT USE ONLY): < 0.02 NG/ML (ref 0–0.04)

## 2019-10-04 PROCEDURE — 83605 ASSAY OF LACTIC ACID: CPT

## 2019-10-04 PROCEDURE — 87040 BLOOD CULTURE FOR BACTERIA: CPT

## 2019-10-04 PROCEDURE — 83735 ASSAY OF MAGNESIUM: CPT

## 2019-10-04 PROCEDURE — 97112 NEUROMUSCULAR REEDUCATION: CPT

## 2019-10-04 PROCEDURE — 85610 PROTHROMBIN TIME: CPT

## 2019-10-04 PROCEDURE — 84145 PROCALCITONIN (PCT): CPT

## 2019-10-04 PROCEDURE — 87070 CULTURE OTHR SPECIMN AEROBIC: CPT

## 2019-10-04 PROCEDURE — 82947 ASSAY GLUCOSE BLOOD QUANT: CPT

## 2019-10-04 PROCEDURE — 94760 N-INVAS EAR/PLS OXIMETRY 1: CPT

## 2019-10-04 PROCEDURE — 97110 THERAPEUTIC EXERCISES: CPT

## 2019-10-04 PROCEDURE — 96374 THER/PROPH/DIAG INJ IV PUSH: CPT

## 2019-10-04 PROCEDURE — 83036 HEMOGLOBIN GLYCOSYLATED A1C: CPT

## 2019-10-04 PROCEDURE — 36415 COLL VENOUS BLD VENIPUNCTURE: CPT

## 2019-10-04 PROCEDURE — 80048 BASIC METABOLIC PNL TOTAL CA: CPT

## 2019-10-04 PROCEDURE — 97161 PT EVAL LOW COMPLEX 20 MIN: CPT

## 2019-10-04 PROCEDURE — 99285 EMERGENCY DEPT VISIT HI MDM: CPT

## 2019-10-04 PROCEDURE — 84484 ASSAY OF TROPONIN QUANT: CPT

## 2019-10-04 PROCEDURE — 84439 ASSAY OF FREE THYROXINE: CPT

## 2019-10-04 PROCEDURE — 82805 BLOOD GASES W/O2 SATURATION: CPT

## 2019-10-04 PROCEDURE — 97530 THERAPEUTIC ACTIVITIES: CPT

## 2019-10-04 PROCEDURE — 70450 CT HEAD/BRAIN W/O DYE: CPT

## 2019-10-04 PROCEDURE — 93005 ELECTROCARDIOGRAM TRACING: CPT

## 2019-10-04 PROCEDURE — 85025 COMPLETE CBC W/AUTO DIFF WBC: CPT

## 2019-10-04 PROCEDURE — 87077 CULTURE AEROBIC IDENTIFY: CPT

## 2019-10-04 PROCEDURE — 81003 URINALYSIS AUTO W/O SCOPE: CPT

## 2019-10-04 PROCEDURE — 84443 ASSAY THYROID STIM HORMONE: CPT

## 2019-10-04 PROCEDURE — 80053 COMPREHEN METABOLIC PANEL: CPT

## 2019-10-04 PROCEDURE — 82962 GLUCOSE BLOOD TEST: CPT

## 2019-10-04 PROCEDURE — 87186 SC STD MICRODIL/AGAR DIL: CPT

## 2019-10-04 PROCEDURE — 96375 TX/PRO/DX INJ NEW DRUG ADDON: CPT

## 2019-10-04 PROCEDURE — 87205 SMEAR GRAM STAIN: CPT

## 2019-10-04 PROCEDURE — 80076 HEPATIC FUNCTION PANEL: CPT

## 2019-10-04 PROCEDURE — 84100 ASSAY OF PHOSPHORUS: CPT

## 2019-10-04 PROCEDURE — 94640 AIRWAY INHALATION TREATMENT: CPT

## 2019-10-04 PROCEDURE — 71045 X-RAY EXAM CHEST 1 VIEW: CPT

## 2019-10-04 PROCEDURE — 83880 ASSAY OF NATRIURETIC PEPTIDE: CPT

## 2019-10-04 RX ADMIN — Medication SCH: at 21:00

## 2019-10-04 NOTE — ER
Nurse's Notes                                                                                     

 [facility]                                                                 

Name: Aj Morocho                                                                                 

Age: 64 yrs                                                                                       

Sex: Male                                                                                         

: 1955                                                                                   

MRN: [id number]                                                                                   

Arrival Date: 10/04/2019                                                                          

Time: 09:53                                                                                       

Account#: [id number]                                                                            

Bed 6                                                                                             

Private MD:                                                                                       

Diagnosis: Pneumonia;Vomiting;Altered mental status, unspecified                                  

                                                                                                  

Presentation:                                                                                     

10/04                                                                                             

10:05 Presenting complaint: EMS states: CALLED OUT FOR HYPOGLYCEMIA, BGL 33 INITIAL, NOW UP   bp  

      TO 77. Transition of care: patient was not received from another setting of care. Onset     

      of symptoms is unknown. Risk Assessment: Do you want to hurt yourself or someone else?      

      Unable to obtain. Initial Sepsis Screen: Does the patient meet any 2 criteria? Altered      

      Mental Status. HR > 90 bpm. Yes Does the patient have a suspected source of infection?      

      Yes: Productive cough/pneumonia. Care prior to arrival: Glucose check: 77.                  

10:05 Method Of Arrival: EMS: [facility]                                                    bp  

10:05 Acuity: OZZY 2                                                                           bp  

                                                                                                  

Triage Assessment:                                                                                

10:07 General: Appears distressed, ill, unkempt, Behavior is inappropriate for age,           bp  

      uncooperative. Pain: Unable to use pain scale. Patient is disoriented. Does not appear      

      to understand pain scale. EENT: No deficits noted. Neuro: Level of Consciousness is         

      awake, confused, Oriented to none. Cardiovascular: Rhythm is sinus tachycardia.             

      Respiratory: Breath sounds are coarse bilaterally. GI: Pt is actively vomiting              

      undigested food. : No signs and/or symptoms were reported regarding the genitourinary     

      system. Derm: No deficits noted. Musculoskeletal: No deficits noted.                        

                                                                                                  

Historical:                                                                                       

- Allergies:                                                                                      

10:07 NKA;                                                                                    bp  

- Home Meds:                                                                                      

10:07 aspirin 81 mg Oral TbEC 1 tab once daily [Active]; BRILINTA 90 mg Oral tab 1 tab 2      bp  

      times per day [Active]; insulin [Active]; levothyroxine 100 mcg tab 1 tab once daily        

      [Active]; metoprolol tartrate 25 mg Oral tab 1 tab 2 times per day [Active];                

      simvastatin 40 mg Oral tab 1 tab nightly [Active];                                          

- PMHx:                                                                                           

10:07 COPD; Diabetes - IDDM; High Cholesterol; Hypertension; Hypothyroidism; Myocardial       bp  

      infarction;                                                                                 

                                                                                                  

- Immunization history:: Adult Immunizations up to date.                                          

- Social history:: Smoking status: unknown.                                                       

- Ebola Screening: : No symptoms or risks identified at this time.                                

                                                                                                  

                                                                                                  

Screening:                                                                                        

10:10 Abuse screen: Denies threats or abuse. Denies injuries from another. Nutritional        bp  

      screening: No deficits noted. Tuberculosis screening: No symptoms or risk factors           

      identified. Fall Risk None identified.                                                      

                                                                                                  

Assessment:                                                                                       

10:09 General: SEE TRIAGE NOTE.                                                               bp  

10:27 Reassessment: PT , UNCOOPERATIVE WITH HEALTH CARE ACTIVITIES. PROVIDER AWARE.    bp  

10:55 Reassessment: PHLEBOTOMY AT B/S.                                                        bp  

11:33 Reassessment: BLOOD SPECIMEN OBTAINED BY PHLEBOTOMY. PT REMAINS NONVERBAL.              bp  

                                                                                                  

Vital Signs:                                                                                      

10:07  / 84; Pulse 116; Resp 29; Temp 97; Pulse Ox 90% on 3 lpm NC;                     bp  

10:29  / 79; Pulse 119; Resp 29; Pulse Ox 91% on 3 lpm NC;                              bp  

11:31  / 78; Pulse 122; Resp 28; Pulse Ox 97% ;                                         bp  

                                                                                                  

ED Course:                                                                                        

09:53 Patient arrived in ED.                                                                  aa5 

10:05 Joaquin Keen, WILLIAM is Primary Nurse.                                                    bp  

10:06 Triage completed.                                                                       bp  

10:07 Arm band placed on right wrist.                                                         bp  

10:09 Delmar Stallworth PA is PHCP.                                                              jmm 

10:09 Dexter Mcdowell MD is Attending Physician.                                             jmm 

10:10 Patient has correct armband on for positive identification. Placed in gown. Bed in low  bp  

      position. Call light in reach. Side rails up X2.                                            

10:20 Inserted saline lock: 24 gauge in left forearm, using aseptic technique.                bp  

10:28 Inserted saline lock: 22 gauge in left wrist, using aseptic technique.                  bp  

10:41 CT Head Brain wo Cont In Process Unspecified.                                           EDMS

12:23 XRAY Chest (1 view) In Process Unspecified.                                             EDMS

13:44 Ever Hong MD is Hospitalizing Provider.                                            jmm 

14:56 No provider procedures requiring assistance completed. Patient admitted, IV remains in  bp  

      place.                                                                                      

                                                                                                  

Administered Medications:                                                                         

11:00 Drug: Zofran 4 mg Route: IVP; Site: left forearm;                                       bp  

11:52 Follow up: Response: No adverse reaction                                                bp  

11:00 Drug: Pepcid 20 mg Route: IVP; Site: left forearm;                                      bp  

11:52 Follow up: Response: No adverse reaction                                                bp  

11:00 Drug: Xopenex (3) 1.25 mg Route: Inhalation;                                            bp  

11:35 Drug: SOLU-Medrol 125 mg Route: IVP; Site: left forearm;                                bp  

11:53 Follow up: Response: No adverse reaction                                                bp  

11:52 Drug: Promethazine 12.5 mg Route: IVP; Site: left forearm;                              bp  

12:40 Drug: NS 0.9% 1000 ml Route: IV; Rate: 1 bolus; Site: left forearm;                     bp  

14:00 Drug: Rocephin - (cefTRIAXone) 1 grams Route: IVPB; Infused Over: 30 mins; Site: left   bp  

      forearm;                                                                                    

                                                                                                  

                                                                                                  

Outcome:                                                                                          

13:44 Decision to Hospitalize by Provider.                                                    jade 

15:40 Admitted to Med/surg accompanied by nurse, via stretcher, room 207, with chart, Report  bp  

      called to  JESUS THOMAS                                                                          

15:42 Condition: stable                                                                       bp  

15:42 Instructed on the need for admit.                                                           

16:17 Patient left the ED.                                                                    bp  

                                                                                                  

Signatures:                                                                                       

Dispatcher [employer]                           EDMS                                                 

Delmar Stallworth PA PA jmm Calderon, Audri, RN                     RN   aa5                                                  

Joaquin Keen RN                      RN   bp                                                   

                                                                                                  

**************************************************************************************************

## 2019-10-04 NOTE — P.HP
Certification for Inpatient


Patient admitted to: Inpatient


With expected LOS: >2 Midnights


Practitioner: I am a practitioner with admitting privileges, knowledge of 

patient current condition, hospital course, and medical plan of care.


Services: Services provided to patient in accordance with Admission 

requirements found in Title 42 Section 412.3 of the Code of Federal Regulations





Patient History


Date of Service: 10/04/19


Reason for admission: Altered mental status hypoglycemia


History of Present Illness: 





Patient is a 64-year-old male with past medical history of COPD diabetes with 

bilateral BKA who was found unresponsive at home with a glucose of 33. Patient 

was brought into the hospital for further evaluation.  His symptoms are 

constant moderate progressive.  Patient was given the D50 in the ER and blood 

glucose improved to 134. Patient response to stimuli however not at his 

baseline.  Workup including head CT scan showed occipital hematoma however no 

signs of sepsis.





Allergies





No Known Drug Allergies Allergy (Verified 09/12/19 23:11)


 Unknown


No Known Allergies Allergy (Uncoded 09/12/19 23:11)


 Unknown





Home medications list reviewed: Yes


Home Medications: 








Aspirin 81 mg PO DAILY 10/14/16 


Insulin Aspart [Novolog*] See Protocol SQ AC 10/14/16 


Simvastatin [Zocor*] 40 mg PO BEDTIME 10/14/16 


Albuterol Inhaler [Ventolin Inhaler*] 2 puff IH Q6H PRN 10/19/16 


Brinzolamide/Brimonidine Tart [Simbrinza 1%-0.2% Eye Drops] 1 drop OP BID 10/19/

16 


Budesonide/Formoterol Fumarate [Symbicort 160-4.5 Mcg Inhaler] 2 puff IH BID 10/

19/16 


Insulin Glargine,Hum.rec.anlog [Lantus] 5 unit SQ DAILY WITH BREAKFAST 06/13/17 


Cyclopentolate 1% [Cyclogyl 1%*] 1 drops EACH EYE PRN PRN 08/15/19 


Levothyroxine [Synthroid*] 88 mcg PO [id number] 08/15/19 








- Past Medical/Surgical History


Diabetic: Yes


-: Hyperlipidemia


-: IDDM


-: Hypothyroidism


-: COPD


-: Left wrist fx 06/2014


-: Arthritis


-: PAD


-: DM


-: MI with 4 stents 2015


-: Cataract sx


-: Hemmorhoidectomy


-: L AKA 2005


-: Rt BKA





- Family History


  ** Mother


-: Heart disease, Diabetes





- Social History


Smoking Status: Unknown if ever smoked


Alcohol use: No


CD- Drugs: No


Caffeine use: Yes


Place of Residence: Home





Review of Systems


10-point ROS is otherwise unremarkable





Physical Examination





- Vital Signs


Temperature: 98.8 F


Blood Pressure: 148/51


Pulse: 109


Respirations: 20


Pulse Ox (%): 98





- Physical Exam


General: In no apparent distress, Unresponsive (Responds to stimuli)


HEENT: Atraumatic, PERRLA, Other (Dry mucous membranes), EOMI, Sclerae 

nonicteric


Neck: Supple, JVD not distended


Respiratory: Clear to auscultation bilaterally, Normal air movement


Cardiovascular: Normal pulses (Radial), Regular rate/rhythm, Normal S1 S2


Gastrointestinal: Normal bowel sounds, Soft and benign, Non-distended, No 

tenderness


Musculoskeletal: No tenderness, Other (Bilateral lower extremity BKA)


Integumentary: No rashes


Neurological: Normal tone, Other (Patient unable to cooperate with neuro exam 

due to his medical condition.  Does open his eyes and mumbles words when 

stimulated.  will Catch arm before dropping)





- Studies


Laboratory Data (last 24 hrs)





10/04/19 11:15: PT 11.2, INR 0.95


10/04/19 11:15: WBC 7.7, Hgb 14.2, Hct 42.2, Plt Count 251


10/04/19 11:15: Sodium 140, Potassium 3.5, BUN 19 H, Creatinine 0.93, Glucose 

134 H, Magnesium 1.7 L, Total Bilirubin 0.4, AST 79 H, ALT 66, Alkaline 

Phosphatase 113








Assessment and Plan





- Plan





1. Acute metabolic encephalopathy.  Likely due to hypoglycemia.  ABG initially 

did not show any acidosis.  Head CT scan negative except for occipital hematoma 

no brain bleed


2. Refractory hypoglycemia will give another amp of D50.  Start on D5 half NS 

monitor blood glucose levels.  Repeat ABG


3. Occipital hematoma.  Likely from fall.


4. Diabetes mellitus type 2 with hypoglycemia.  Hold oral hypoglycemics monitor 

blood glucose levels


5. COPD chronic bronchitis.  Will continue nebulizer treatments as needed


6. Bilateral BKA


7. Hypothyroidism.Check TSH resume home medications


8. Hyperlipidemia mixed  statin


No chemical anticoagulation for DVT prophylaxis due to hematoma


Discharge Plan: Home


Plan to discharge in: 48 Hours





- Advance Directives


Does patient have a Living Will: Yes


Does patient have a Durable POA for Healthcare: Yes





- Code Status/Comfort Care


Code Status Assessed: Yes

## 2019-10-04 NOTE — EDPHYS
Physician Documentation                                                                           

 [facility]                                                                 

Name: Aj Morocho                                                                                 

Age: 64 yrs                                                                                       

Sex: Male                                                                                         

: 1955                                                                                   

MRN: [id number]                                                                                   

Arrival Date: 10/04/2019                                                                          

Time: 09:53                                                                                       

Account#: [id number]                                                                            

Bed 6                                                                                             

Private MD:                                                                                       

ED Physician Dexter Mcdowell                                                                      

HPI:                                                                                              

10/04                                                                                             

10:30 This 64 yrs old  Male presents to ER via EMS with complaints of low blood      jmm 

      glucose.                                                                                    

10:30 Onset: The symptoms/episode began/occurred today. Associated signs and symptoms:.       jmm 

      Current symptoms: In the emergency department the patient's symptoms have improved.         

      This is a 64 year old male with a history of COPD, DM, HTN, Hypothyroidism, that            

      presents to the ED with a low BGL this morning of 33. Patient had one episode of            

      vomiting in the ED. .                                                                       

                                                                                                  

Historical:                                                                                       

- Allergies:                                                                                      

10:07 NKA;                                                                                    bp  

- Home Meds:                                                                                      

10:07 aspirin 81 mg Oral TbEC 1 tab once daily [Active]; BRILINTA 90 mg Oral tab 1 tab 2      bp  

      times per day [Active]; insulin [Active]; levothyroxine 100 mcg tab 1 tab once daily        

      [Active]; metoprolol tartrate 25 mg Oral tab 1 tab 2 times per day [Active];                

      simvastatin 40 mg Oral tab 1 tab nightly [Active];                                          

- PMHx:                                                                                           

10:07 COPD; Diabetes - IDDM; High Cholesterol; Hypertension; Hypothyroidism; Myocardial       bp  

      infarction;                                                                                 

                                                                                                  

- Immunization history:: Adult Immunizations up to date.                                          

- Social history:: Smoking status: unknown.                                                       

- Ebola Screening: : No symptoms or risks identified at this time.                                

                                                                                                  

                                                                                                  

ROS:                                                                                              

10:30 Constitutional: Negative for fever, chills, and weight loss, Cardiovascular: Negative   jmm 

      for chest pain, palpitations, and edema, Respiratory: Negative for shortness of breath,     

      cough, wheezing, and pleuritic chest pain.                                                  

10:30 Abdomen/GI: Positive for vomiting.                                                          

10:30 All other systems are negative.                                                             

                                                                                                  

Exam:                                                                                             

10:30 Head/Face:  atraumatic. Eyes:  EOMI, no conjunctival erythema appreciated ENT:  Moist   jmm 

      Mucus Membranes Neck:  Trachea midline, Supple Chest/axilla:  Normal chest wall             

      appearance and motion.   Cardiovascular:  Regular rate and rhythm.  No edema                

      appreciated Respiratory:  Normal respirations, no respiratory distress appreciated          

      Abdomen/GI:  Non distended, soft Skin:  General appearance color normal MS/ Extremity:      

      Moves all extremities, no obvious deformities appreciated, no edema noted to the lower      

      extremities                                                                                 

10:30 Constitutional: The patient appears in no acute distress, alert, awake.                     

                                                                                                  

Vital Signs:                                                                                      

10:07  / 84; Pulse 116; Resp 29; Temp 97; Pulse Ox 90% on 3 lpm NC;                     bp  

10:29  / 79; Pulse 119; Resp 29; Pulse Ox 91% on 3 lpm NC;                              bp  

11:31  / 78; Pulse 122; Resp 28; Pulse Ox 97% ;                                         bp  

                                                                                                  

MDM:                                                                                              

10:11 Patient medically screened.                                                             [facility] 

13:43 Data reviewed: vital signs, nurses notes. Counseling: I had a detailed discussion with  [facility] 

      the patient and/or guardian regarding:. ED course: I discussed the patient with Dr. Hong whom accepted admission. .                                                            

                                                                                                  

10/04                                                                                             

10:10 Order name: Basic Metabolic Panel; Complete Time: 12:17                                 [facility] 

10/04                                                                                             

10:10 Order name: CBC with Diff; Complete Time: 11:47                                         [facility] 

10/04                                                                                             

10:10 Order name: LFT's; Complete Time: 12:17                                                 [facility] 

10/04                                                                                             

10:10 Order name: Magnesium; Complete Time: 12:17                                             [facility] 

10/04                                                                                             

10:10 Order name: NT PRO-BNP; Complete Time: 12:17                                            [facility] 

10/04                                                                                             

10:10 Order name: PT-INR; Complete Time: 11:47                                                [facility] 

10/04                                                                                             

10:10 Order name: Troponin (emerg Dept Use Only); Complete Time: 12:17                        [facility] 

10/04                                                                                             

10:10 Order name: XRAY Chest (1 view)                                                         [facility] 

10/04                                                                                             

10:20 Order name: CT Head Brain wo Cont                                                       [facility] 

10/04                                                                                             

10:21 Order name: Lactate; Complete Time: 12:17                                               [facility] 

10/04                                                                                             

10:21 Order name: Procalcitonin; Complete Time: 12:17                                         [facility] 

10/04                                                                                             

10:21 Order name: Blood Culture Adult (2)                                                     [facility] 

10/04                                                                                             

10:21 Order name: ABG; Complete Time: 11:14                                                   [facility] 

10/04                                                                                             

10:10 Order name: EKG; Complete Time: 10:11                                                   [facility] 

10/04                                                                                             

10:10 Order name: Cardiac monitoring; Complete Time: 10:11                                    [facility] 

10/04                                                                                             

10:10 Order name: EKG - Nurse/Tech; Complete Time: 10:29                                      [facility] 

10/04                                                                                             

10:10 Order name: IV Saline Lock; Complete Time: 10:29                                        [facility] 

10/04                                                                                             

10:10 Order name: O2 Per Protocol; Complete Time: 10:11                                       [facility] 

10/04                                                                                             

10:10 Order name: O2 Sat Monitoring; Complete Time: 10:                                     [facility] 

                                                                                                  

Administered Medications:                                                                         

11:00 Drug: Zofran 4 mg Route: IVP; Site: left forearm;                                       bp  

11:52 Follow up: Response: No adverse reaction                                                bp  

11:00 Drug: Pepcid 20 mg Route: IVP; Site: left forearm;                                      bp  

11:52 Follow up: Response: No adverse reaction                                                bp  

11:00 Drug: Xopenex (3) 1.25 mg Route: Inhalation;                                            bp  

11:35 Drug: SOLU-Medrol 125 mg Route: IVP; Site: left forearm;                                bp  

11:53 Follow up: Response: No adverse reaction                                                bp  

11:52 Drug: Promethazine 12.5 mg Route: IVP; Site: left forearm;                              bp  

12:40 Drug: NS 0.9% 1000 ml Route: IV; Rate: 1 bolus; Site: left forearm;                     bp  

14:00 Drug: Rocephin - (cefTRIAXone) 1 grams Route: IVPB; Infused Over: 30 mins; Site: left   bp  

      forearm;                                                                                    

                                                                                                  

                                                                                                  

Disposition:                                                                                      

10/04/19 13:44 Hospitalization ordered by Ever Hong for Observation. Preliminary diagnosis    

  are Pneumonia, Vomiting, Altered mental status, unspecified.                                    

- Bed requested for Telemetry/MedSurg (observation).                                              

- Status is Observation.                                                                      bp  

- Condition is Stable.                                                                            

- Problem is new.                                                                                 

- Symptoms are unchanged.                                                                         

UTI on Admission? No                                                                              

                                                                                                  

                                                                                                  

                                                                                                  

Addendum:                                                                                         

10/06/2019                                                                                        

     07:53 Co-signature as Attending Physician, Dexter Mcdowell MD I agree with the assessment and  c
ha

           plan of care.                                                                          

                                                                                                  

Signatures:                                                                                       

Dispatcher [employer]                           Dexter Blanc MD MD cha Mickail, Joel, PA                       PA   Colleen Pichardo, RN                     RN   aa5                                                  

Mary Carmen Sharif                              3                                                  

Joaquin Keen, RN                      RN   bp                                                   

                                                                                                  

Corrections: (The following items were deleted from the chart)                                    

10/04                                                                                             

14:43 13:44 Hospitalization Ordered by Ever Hong MD for Observation. Preliminary diagnosis aa5 

      is Pneumonia; Vomiting; Altered mental status, unspecified. Bed requested for               

      Telemetry/MedSurg (observation). Status is Observation. Condition is Stable. Problem is     

      new. Symptoms are unchanged. UTI on Admission? No. jmm                                      

15:00 14:43 10/04/2019 13:44 Hospitalization Ordered by Ever Hong MD for Observation.      aa5 

      Preliminary diagnosis is Pneumonia; Vomiting; Altered mental status, unspecified. Bed       

      requested for Telemetry/MedSurg (observation). Status is Observation. Condition is          

      Stable. Problem is new. Symptoms are unchanged. UTI on Admission? No. aa5                   

15:16 15:00 10/04/2019 13:44 Hospitalization Ordered by Ever Hong MD for Observation.      dh3 

      Preliminary diagnosis is Pneumonia; Vomiting; Altered mental status, unspecified. Bed       

      requested for Telemetry/MedSurg (observation). Status is Observation. Condition is          

      Stable. Problem is new. Symptoms are unchanged. UTI on Admission? No. aa5                   

16:17 15:16 10/04/2019 13:44 Hospitalization Ordered by Ever Hong MD for Observation.      bp  

      Preliminary diagnosis is Pneumonia; Vomiting; Altered mental status, unspecified. Bed       

      requested for Telemetry/MedSurg (observation). Status is Observation. Condition is          

      Stable. Problem is new. Symptoms are unchanged. UTI on Admission? No. dh3                   

                                                                                                  

**************************************************************************************************

## 2019-10-04 NOTE — RAD REPORT
EXAM DESCRIPTION:  [facility] Single View10/4/2019 7:08 pm

 

CLINICAL HISTORY:  Chest pain

 

COMPARISON:  September 2019

 

FINDINGS:  Moderate patchy bibasilar lung opacities

 

The heart is normal size

 

IMPRESSION:  Moderate patchy bilateral lung opacities consistent with aspiration pneumonia

## 2019-10-05 LAB
ALBUMIN SERPL BCP-MCNC: 2.5 G/DL (ref 3.4–5)
ALP SERPL-CCNC: 78 U/L (ref 45–117)
ALT SERPL W P-5'-P-CCNC: 50 U/L (ref 12–78)
AST SERPL W P-5'-P-CCNC: 71 U/L (ref 15–37)
BLD SMEAR INTERP: (no result)
BUN BLD-MCNC: 22 MG/DL (ref 7–18)
GLUCOSE SERPLBLD-MCNC: 252 MG/DL (ref 74–106)
HCT VFR BLD CALC: 35.5 % (ref 39.6–49)
LYMPHOCYTES # SPEC AUTO: 0.8 K/UL (ref 0.7–4.9)
MAGNESIUM SERPL-MCNC: 1.7 MG/DL (ref 1.8–2.4)
MORPHOLOGY BLD-IMP: (no result)
PMV BLD: 10 FL (ref 7.6–11.3)
POTASSIUM SERPL-SCNC: 3.9 MMOL/L (ref 3.5–5.1)
RBC # BLD: 3.98 M/UL (ref 4.33–5.43)
TSH SERPL DL<=0.05 MIU/L-ACNC: 0.31 UIU/ML (ref 0.36–3.74)
UA DIPSTICK W REFLEX MICRO PNL UR: (no result)

## 2019-10-05 RX ADMIN — PIPERACILLIN SODIUM AND TAZOBACTAM SODIUM SCH MLS: 3; .375 INJECTION, POWDER, LYOPHILIZED, FOR SOLUTION INTRAVENOUS at 17:19

## 2019-10-05 RX ADMIN — ARFORMOTEROL TARTRATE SCH MCG: 15 SOLUTION RESPIRATORY (INHALATION) at 20:55

## 2019-10-05 RX ADMIN — IPRATROPIUM BROMIDE PRN MG: 0.5 SOLUTION RESPIRATORY (INHALATION) at 13:20

## 2019-10-05 RX ADMIN — ALBUTEROL SULFATE PRN MG: 2.5 SOLUTION RESPIRATORY (INHALATION) at 07:45

## 2019-10-05 RX ADMIN — SODIUM CHLORIDE SCH: 0.45 INJECTION, SOLUTION INTRAVENOUS at 12:00

## 2019-10-05 RX ADMIN — LEVOTHYROXINE SODIUM SCH: 88 TABLET ORAL at 06:00

## 2019-10-05 RX ADMIN — PIPERACILLIN SODIUM AND TAZOBACTAM SODIUM SCH MLS: 3; .375 INJECTION, POWDER, LYOPHILIZED, FOR SOLUTION INTRAVENOUS at 00:46

## 2019-10-05 RX ADMIN — ATORVASTATIN CALCIUM SCH MG: 20 TABLET, FILM COATED ORAL at 21:24

## 2019-10-05 RX ADMIN — PANTOPRAZOLE SODIUM SCH: 40 TABLET, DELAYED RELEASE ORAL at 06:30

## 2019-10-05 RX ADMIN — Medication SCH ML: at 10:18

## 2019-10-05 RX ADMIN — ALBUTEROL SULFATE PRN MG: 2.5 SOLUTION RESPIRATORY (INHALATION) at 13:20

## 2019-10-05 RX ADMIN — Medication SCH ML: at 21:25

## 2019-10-05 RX ADMIN — SODIUM CHLORIDE SCH MLS: 0.45 INJECTION, SOLUTION INTRAVENOUS at 04:14

## 2019-10-05 RX ADMIN — PIPERACILLIN SODIUM AND TAZOBACTAM SODIUM SCH MLS: 3; .375 INJECTION, POWDER, LYOPHILIZED, FOR SOLUTION INTRAVENOUS at 09:00

## 2019-10-05 RX ADMIN — ASPIRIN 81 MG SCH MG: 81 TABLET ORAL at 10:18

## 2019-10-05 RX ADMIN — IPRATROPIUM BROMIDE PRN MG: 0.5 SOLUTION RESPIRATORY (INHALATION) at 07:45

## 2019-10-05 RX ADMIN — SODIUM CHLORIDE SCH MLS: 0.45 INJECTION, SOLUTION INTRAVENOUS at 17:19

## 2019-10-05 RX ADMIN — ARFORMOTEROL TARTRATE SCH MCG: 15 SOLUTION RESPIRATORY (INHALATION) at 07:45

## 2019-10-05 RX ADMIN — HUMAN INSULIN SCH UNIT: 100 INJECTION, SOLUTION SUBCUTANEOUS at 17:20

## 2019-10-05 NOTE — RAD REPORT
EXAM DESCRIPTION:  [facility] Single View10/5/2019 6:46 am

 

CLINICAL HISTORY:  Chest pain

 

COMPARISON:  October 4th

 

FINDINGS:  Mild improvement in the bibasilar lung opacities

 

No other change

 

IMPRESSION:  Mild improvement in the bibasilar aspiration pneumonitis

## 2019-10-05 NOTE — EKG
Test Date:    2019-10-04               Test Time:    10:24:51

Technician:   LISS                                     

                                                     

MEASUREMENT RESULTS:                                       

Intervals:                                           

Rate:         119                                    

IA:           136                                    

QRSD:         100                                    

QT:           350                                    

QTc:          492                                    

Axis:                                                

P:            84                                     

IA:           136                                    

QRS:          211                                    

T:            67                                     

                                                     

INTERPRETIVE STATEMENTS:                                       

                                                     

Sinus tachycardia

Right atrial enlargement

Right superior axis deviation

Pulmonary disease pattern

Septal infarct, age undetermined

Cannot rule out Inferior infarct, age undetermined

Abnormal ECG

Compared to ECG 09/12/2019 09:54:16

Myocardial infarct finding now present



Electronically Signed On 10-05-19 04:40:50 CDT by Duglas Ricardo

## 2019-10-05 NOTE — RAD REPORT
EXAM DESCRIPTION:  CT - Head Brain Wo Cont - 10/4/2019 7:08 pm

 

CLINICAL HISTORY:  Transient alteration of awareness

 

COMPARISON:  CT head June 2017

 

TECHNIQUE:  Axial 5 mm thick images of the head were obtained without IV contrast.

 

All CT scans are performed using dose optimization technique as appropriate and may include automated
 exposure control or mA/KV adjustment according to patient size.

 

FINDINGS:  No intracranial hemorrhage, mass, edema or shift of mid-line structures. No acute cortical
 based infarction. No cortical edema or sulcal effacement. Relative increase in density along the fal
x is similar to comparison. Atrophy and chronic ischemic changes are present matching the comparison.
 No abnormal extra-axial fluid collections. Ventricles are in proportion to volume loss. Arterial and
 physiologic calcifications are present. Right mastoid air cells are clear. Left mastoid air cells ar
e partially opacified. Paranasal sinus opacification is present. Air-fluid level noted in the right s
inus. Sinus disease is less prominent than seen on the prior study.

 

No globe or orbital content abnormality.

 

No acute bony findings.

 

Due to prolonged technical difficulties with the PACs transcription systems, final written report was
 delayed. Preliminary verbal report was provided at the time of the study.

 

IMPRESSION:  Atrophy and chronic ischemic changes are present with no acute intracranial finding. Int
racranial findings match the comparison.

 

Mucosal thickening in the paranasal sinuses with right maxillary sinus air-fluid level. Partial opaci
fication of the left mastoid air cells.

## 2019-10-05 NOTE — P.PN
Subjective


Date of Service: 10/05/19


[facility]


Chief Complaint: Altered mental status hypoglycemia


Subjective: Other (Patient more alert.  Daughter at bedside.)





Physical Examination





- Vital Signs


Temperature: 99.1 F


Blood Pressure: 138/64


Pulse: 102


Respirations: 18


Pulse Ox (%): 96





- Physical Exam


General: Alert, In no apparent distress, Cooperative


HEENT: Atraumatic


Neck: Supple


Respiratory: Crackles/rales (Crackles to the bases bilateral)


Cardiovascular: Normal pulses, Regular rate/rhythm


Gastrointestinal: Normal bowel sounds, Soft and benign, Non-distended


Musculoskeletal: No erythema, No tenderness, No warmth


Integumentary: No tenderness/swelling, No erythema, No warmth, No cyanosis


Neurological: Normal speech, Normal strength at 5/5 x4 extr, Normal tone, 

Normal affect





- Studies


Laboratory Data (last 24 hrs)





10/04/19 11:15: PT 11.2, INR 0.95


10/04/19 11:15: WBC 7.7, Hgb 14.2, Hct 42.2, Plt Count 251


10/04/19 11:15: Sodium 140, Potassium 3.5, BUN 19 H, Creatinine 0.93, Glucose 

134 H, Magnesium 1.7 L, Total Bilirubin 0.4, AST 79 H, ALT 66, Alkaline 

Phosphatase 113





Medications List Reviewed: Yes





Assessment & Plan


Discharge Plan: Home


Plan to discharge in: Greater than 2 days


Physician Review Additional Text: 





Impression:


Acute metabolic encephalopathy related to hypoglycemia and bilateral pneumonia 

likely aspiration


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


Hypertension


Hypothyroidism


GERD


Hyperlipidemia


Prior lower extremity amputations


COPD





Plan:


Acute metabolic encephalopathy related to hypoglycemia and bilateral pneumonia 

likely aspiration:  Overall mentation improved.  Blood sugar stable.  CT head 

showed no intracranial abnormality.  Chest x-ray showed bilateral pneumonia 

likely aspiration.  Both reviewed with radiology.  Medication adjusted.  

Discontinue Zithromax/Rocephin.  Change to IV Zosyn.  Recheck chest x-ray 

tomorrow.  Maintain sats above 90%.  Aspiration and fall precaution in place.  

Will provide DVT prophylaxis-Lovenox.  Continue IV fluids.  Further adjustment 

in his diabetic medication will be required prior to discharge. Once more alert 

physical therapy may be consulted.  Case discussed at length with daughter.  

Daughter agrees with current plan of care.  Daytime hospitalist will continue 

his care.  Likely discharge in the next 3-5 days pending clinical improvement.


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  

Continue Accu-Cheks and sliding scale.  Patient currently on D5 IV fluids.  

Patient may not require basal insulin at discharge. Discussed in detail with 

daughter about the need for strict control of his medications.  Patient 

currently giving his own medications.  Patient will likely require help.  

Social work may help in this process.


Hypertension:  Will need to review and restart home medication.  Provide 

medication as needed.


Hypothyroidism:  Continue home medication.  Will check tsh.


GERD:  Will start Protonix.


Hyperlipidemia:  Continue with home medication.


Prior lower extremity amputations:  Overall stable.


COPD:  Continue COPD medication.  Maintain sats above 90%.  Will provide 

incentive spirometer once more alert.


Time Spent Managing Pts Care (In Minutes): 55

## 2019-10-06 LAB
ALBUMIN SERPL BCP-MCNC: 2.6 G/DL (ref 3.4–5)
ALP SERPL-CCNC: 77 U/L (ref 45–117)
ALT SERPL W P-5'-P-CCNC: 42 U/L (ref 12–78)
AST SERPL W P-5'-P-CCNC: 60 U/L (ref 15–37)
BUN BLD-MCNC: 24 MG/DL (ref 7–18)
GLUCOSE SERPLBLD-MCNC: 256 MG/DL (ref 74–106)
HCT VFR BLD CALC: 33.7 % (ref 39.6–49)
LYMPHOCYTES # SPEC AUTO: 2 K/UL (ref 0.7–4.9)
MAGNESIUM SERPL-MCNC: 2.2 MG/DL (ref 1.8–2.4)
PMV BLD: 9.8 FL (ref 7.6–11.3)
POTASSIUM SERPL-SCNC: 3.9 MMOL/L (ref 3.5–5.1)
RBC # BLD: 3.76 M/UL (ref 4.33–5.43)

## 2019-10-06 RX ADMIN — SODIUM CHLORIDE SCH MLS: 0.45 INJECTION, SOLUTION INTRAVENOUS at 15:15

## 2019-10-06 RX ADMIN — INSULIN LISPRO SCH UNIT: 100 INJECTION, SOLUTION INTRAVENOUS; SUBCUTANEOUS at 07:30

## 2019-10-06 RX ADMIN — ATORVASTATIN CALCIUM SCH MG: 20 TABLET, FILM COATED ORAL at 20:39

## 2019-10-06 RX ADMIN — Medication SCH ML: at 20:39

## 2019-10-06 RX ADMIN — ALBUTEROL SULFATE PRN MG: 2.5 SOLUTION RESPIRATORY (INHALATION) at 08:26

## 2019-10-06 RX ADMIN — ARFORMOTEROL TARTRATE SCH MCG: 15 SOLUTION RESPIRATORY (INHALATION) at 20:00

## 2019-10-06 RX ADMIN — PANTOPRAZOLE SODIUM SCH MG: 40 TABLET, DELAYED RELEASE ORAL at 05:55

## 2019-10-06 RX ADMIN — INSULIN LISPRO SCH UNIT: 100 INJECTION, SOLUTION INTRAVENOUS; SUBCUTANEOUS at 17:23

## 2019-10-06 RX ADMIN — INSULIN LISPRO SCH: 100 INJECTION, SOLUTION INTRAVENOUS; SUBCUTANEOUS at 13:00

## 2019-10-06 RX ADMIN — INSULIN LISPRO SCH UNIT: 100 INJECTION, SOLUTION INTRAVENOUS; SUBCUTANEOUS at 01:54

## 2019-10-06 RX ADMIN — IPRATROPIUM BROMIDE PRN MG: 0.5 SOLUTION RESPIRATORY (INHALATION) at 08:26

## 2019-10-06 RX ADMIN — HUMAN INSULIN SCH: 100 INJECTION, SOLUTION SUBCUTANEOUS at 00:00

## 2019-10-06 RX ADMIN — PIPERACILLIN SODIUM AND TAZOBACTAM SODIUM SCH MLS: 3; .375 INJECTION, POWDER, LYOPHILIZED, FOR SOLUTION INTRAVENOUS at 09:32

## 2019-10-06 RX ADMIN — Medication SCH ML: at 09:33

## 2019-10-06 RX ADMIN — PIPERACILLIN SODIUM AND TAZOBACTAM SODIUM SCH MLS: 3; .375 INJECTION, POWDER, LYOPHILIZED, FOR SOLUTION INTRAVENOUS at 17:00

## 2019-10-06 RX ADMIN — PIPERACILLIN SODIUM AND TAZOBACTAM SODIUM SCH MLS: 3; .375 INJECTION, POWDER, LYOPHILIZED, FOR SOLUTION INTRAVENOUS at 00:31

## 2019-10-06 RX ADMIN — ASPIRIN 81 MG SCH MG: 81 TABLET ORAL at 09:34

## 2019-10-06 RX ADMIN — LEVOTHYROXINE SODIUM SCH MG: 88 TABLET ORAL at 05:55

## 2019-10-06 RX ADMIN — ARFORMOTEROL TARTRATE SCH MCG: 15 SOLUTION RESPIRATORY (INHALATION) at 08:26

## 2019-10-06 NOTE — PN
Date of Progress Note:  10/06/2019



Subjective:  Patient seen and examined.  Chart reviewed and case discussed with 
RN.  No acute events overnight.  Patient is doing significantly better.  Did 
refuse regular insulin in the morning and was switched over to Humalog.  
Patient continues to have uncontrolled blood sugar levels.

  Medication list reviewed.



Physical Examination:

Vital Signs:  Temperature 97.2, heart rate 85, blood pressure 136/61, 
respirations 19, O2 of 97% on room air. 

General:  Awake, alert, oriented x3, not in any acute distress. 

CVS:  S1, S2.  No murmurs.  Regular rate and rhythm. 

Gastrointestinal:  Abdomen is soft, nontender, nondistended.  Positive bowel 
sounds. 

Extremities:  No clubbing, cyanosis, or edema. 

Musculoskeletal:  Patient has bilateral BKA. 

Neurologic:  Nonfocal.



Laboratory Data:  Sodium 141, potassium 3.9, chloride 109, CO2 of 26, BUN 24, 
creatinine 1.1, glucose 256.  Accu-Chek shows 516.  Calcium 8.8.  Magnesium 
2.2.  WBC 8.1, H and H 11.3 and 33.7, platelets 196, neutrophils 62%.  Blood 
cultures, no growth to date.



Assessment And Plan:  A 64-year-old male with:

1.   Acute metabolic encephalopathy secondary to hypoglycemia resolved.  
Patient now back to baseline.

2.   Refractory hyperglycemia, corrected now with hypoglycemia.

3.   Bilateral pneumonia, likely aspiration.  We will continue with Zosyn.

4.   Diabetes mellitus type 2, now with hyperglycemia.  We will adjust insulin.
  Patient refusing to take regular insulin.  States that it causes him 
hypoglycemic episodes despite counseling.  Patient has been switched to 
Humalog.  We will add Lantus.

5.   Essential hypertension.

6.   Hypothyroidism.  We will continue Synthroid.

7.   Gastroesophageal reflux disease.

8.   Hyperlipidemia.  Continue home medications.

9.   Bilateral lower extremity below knee amputation.

10.   Chronic obstructive pulmonary disease, chronic bronchitis, stable.



Plan:  We will hold discharge.  Adjust insulin dose.  Likely discharge in the 
next 24 hours.





/ODALIS

DD:  10/06/2019 13:12:37   Voice ID:  [id number]

DT:  10/06/2019 18:19:33   Report ID:  [id number]

MTDD

## 2019-10-07 LAB
BUN BLD-MCNC: 15 MG/DL (ref 7–18)
GLUCOSE SERPLBLD-MCNC: 139 MG/DL (ref 74–106)
MAGNESIUM SERPL-MCNC: 2 MG/DL (ref 1.8–2.4)
POTASSIUM SERPL-SCNC: 3.6 MMOL/L (ref 3.5–5.1)

## 2019-10-07 RX ADMIN — Medication SCH: at 09:00

## 2019-10-07 RX ADMIN — INSULIN LISPRO SCH: 100 INJECTION, SOLUTION INTRAVENOUS; SUBCUTANEOUS at 00:00

## 2019-10-07 RX ADMIN — ARFORMOTEROL TARTRATE SCH MCG: 15 SOLUTION RESPIRATORY (INHALATION) at 20:00

## 2019-10-07 RX ADMIN — ARFORMOTEROL TARTRATE SCH MCG: 15 SOLUTION RESPIRATORY (INHALATION) at 08:10

## 2019-10-07 RX ADMIN — Medication SCH ML: at 20:11

## 2019-10-07 RX ADMIN — ASPIRIN 81 MG SCH MG: 81 TABLET ORAL at 09:36

## 2019-10-07 RX ADMIN — LEVOTHYROXINE SODIUM SCH MG: 88 TABLET ORAL at 06:49

## 2019-10-07 RX ADMIN — INSULIN LISPRO SCH UNIT: 100 INJECTION, SOLUTION INTRAVENOUS; SUBCUTANEOUS at 12:25

## 2019-10-07 RX ADMIN — INSULIN LISPRO SCH: 100 INJECTION, SOLUTION INTRAVENOUS; SUBCUTANEOUS at 06:00

## 2019-10-07 RX ADMIN — INSULIN LISPRO SCH: 100 INJECTION, SOLUTION INTRAVENOUS; SUBCUTANEOUS at 17:56

## 2019-10-07 RX ADMIN — PIPERACILLIN SODIUM AND TAZOBACTAM SODIUM SCH MLS: 3; .375 INJECTION, POWDER, LYOPHILIZED, FOR SOLUTION INTRAVENOUS at 00:23

## 2019-10-07 RX ADMIN — PIPERACILLIN SODIUM AND TAZOBACTAM SODIUM SCH MLS: 3; .375 INJECTION, POWDER, LYOPHILIZED, FOR SOLUTION INTRAVENOUS at 18:10

## 2019-10-07 RX ADMIN — ATORVASTATIN CALCIUM SCH MG: 20 TABLET, FILM COATED ORAL at 20:11

## 2019-10-07 RX ADMIN — ALBUTEROL SULFATE PRN MG: 2.5 SOLUTION RESPIRATORY (INHALATION) at 08:15

## 2019-10-07 RX ADMIN — PIPERACILLIN SODIUM AND TAZOBACTAM SODIUM SCH MLS: 3; .375 INJECTION, POWDER, LYOPHILIZED, FOR SOLUTION INTRAVENOUS at 09:36

## 2019-10-07 RX ADMIN — IPRATROPIUM BROMIDE PRN MG: 0.5 SOLUTION RESPIRATORY (INHALATION) at 08:15

## 2019-10-07 RX ADMIN — PANTOPRAZOLE SODIUM SCH MG: 40 TABLET, DELAYED RELEASE ORAL at 06:48

## 2019-10-07 NOTE — DS
Admitting Diagnoses:  

1.   Acute metabolic encephalopathy.

2.   Refractory hypoglycemia.

3.   Occipital hematoma.

4.   Diabetes mellitus type 2 with hypoglycemia.

5.   Chronic obstructive pulmonary disease, chronic bronchitis.

6.   Bilateral below knee amputation.

7.   Hypothyroidism.

8.   Mixed hyperlipidemia.



Discharge Diagnoses:  

1.   Acute metabolic encephalopathy, likely due to hypoglycemia, resolved, back 
to baseline.

2.   Refractory hypoglycemia, corrected.

3.   Bilateral pneumonia, likely aspiration.  We will finish off course of 
antibiotics.

4.   Diabetes mellitus type 2, now with hyperglycemia, brittle diabetic with 
poor control.  A1c is 9.

5.   Mixed hyperlipidemia.

6.   Bilateral lower extremity below knee amputations.

7.   Chronic obstructive pulmonary disease, chronic bronchitis, stable.



Hospital Course:  Patient is a 64-year-old male with past medical history of 
COPD, diabetes, bilateral BKA, who was unresponsive with a glucose of 33.  
Patient was brought into the ER for further evaluation.  He was started on D5W, 
his blood glucose levels improved.  ABG did not show a severe acidosis.  He was 
responding to painful stimuli.  Repeat ABG showed improvement.  His blood 
cultures and sputum cultures did grow out Staph coagulase positive.  Patient 
did have aspiration type pneumonia likely due to his decreased mental status 
when he was hypoglycemic.  Patient's mental status improved with improvement in 
his blood glucose levels.  He does not remember what happened.  Patient takes 5 
units of Lantus at home along with Novolin for sliding scale.  Patient states 
the sliding scale makes his sugars drop as sometimes he does not eat after 
taking the sliding scale.  Patient then became hyperglycemic despite being 
taken off D5W with blood sugars in the 500s.  He has had history of DKA in the 
past.  Insulin dose was adjusted.  He was given Lantus.  Patient again had an 
episode of hypoglycemia with glucose in 20.  Medications were adjusted.  He 
will need to [facility].  His pneumonia 
improved significantly.  Chest x-ray showed significant improvement.  Patient's 
blood sugars were then more stable in the 100s and therefore was cleared for 
discharge, was sent home in a stable condition.



Activities:  As tolerated.



Medications:  As per medication reconciliation list.



Followup:  Follow up with primary care physician in 2-3 days.  Follow up with 
[facility]in[facility] in [facility] weeks.  Return to ER for worsening condition.



Diet:  Diabetic.



Instructions:  Fall precautions.



Physical Examination:

General:  Awake, alert, oriented x3.  Elderly male, not in any acute distress. 

CV:  S1-S2.  No murmurs. 

Respiratory:  Moving air well bilaterally. 

Abdomen:  Soft, nontender, nondistended.  Positive bowel sounds. 

Extremities:  Bilateral BKA. 

Neurologic:  Nonfocal. 



It should be noted that the patient did not have any occipital hematoma, likely 
error from the verbal report given while the phone lines and fax lines were 
down.





/ODALIS

DD:  10/07/2019 13:02:24   Voice ID:  [id number]

DT:  10/07/2019 20:11:17   Report ID:  [id number]

MTDD

## 2019-10-08 LAB
BUN BLD-MCNC: 13 MG/DL (ref 7–18)
GLUCOSE SERPLBLD-MCNC: 145 MG/DL (ref 74–106)
MAGNESIUM SERPL-MCNC: 2 MG/DL (ref 1.8–2.4)
POTASSIUM SERPL-SCNC: 4.1 MMOL/L (ref 3.5–5.1)

## 2019-10-08 RX ADMIN — ASPIRIN 81 MG SCH MG: 81 TABLET ORAL at 08:23

## 2019-10-08 RX ADMIN — LEVOTHYROXINE SODIUM SCH MG: 88 TABLET ORAL at 05:21

## 2019-10-08 RX ADMIN — PIPERACILLIN SODIUM AND TAZOBACTAM SODIUM SCH MLS: 3; .375 INJECTION, POWDER, LYOPHILIZED, FOR SOLUTION INTRAVENOUS at 16:53

## 2019-10-08 RX ADMIN — PANTOPRAZOLE SODIUM SCH MG: 40 TABLET, DELAYED RELEASE ORAL at 05:21

## 2019-10-08 RX ADMIN — INSULIN LISPRO SCH: 100 INJECTION, SOLUTION INTRAVENOUS; SUBCUTANEOUS at 16:34

## 2019-10-08 RX ADMIN — IPRATROPIUM BROMIDE PRN MG: 0.5 SOLUTION RESPIRATORY (INHALATION) at 19:45

## 2019-10-08 RX ADMIN — INSULIN LISPRO SCH UNIT: 100 INJECTION, SOLUTION INTRAVENOUS; SUBCUTANEOUS at 00:24

## 2019-10-08 RX ADMIN — INSULIN LISPRO SCH: 100 INJECTION, SOLUTION INTRAVENOUS; SUBCUTANEOUS at 05:40

## 2019-10-08 RX ADMIN — Medication SCH ML: at 08:24

## 2019-10-08 RX ADMIN — PIPERACILLIN SODIUM AND TAZOBACTAM SODIUM SCH MLS: 3; .375 INJECTION, POWDER, LYOPHILIZED, FOR SOLUTION INTRAVENOUS at 00:24

## 2019-10-08 RX ADMIN — Medication SCH ML: at 21:09

## 2019-10-08 RX ADMIN — ARFORMOTEROL TARTRATE SCH MCG: 15 SOLUTION RESPIRATORY (INHALATION) at 08:06

## 2019-10-08 RX ADMIN — INSULIN LISPRO SCH UNIT: 100 INJECTION, SOLUTION INTRAVENOUS; SUBCUTANEOUS at 12:08

## 2019-10-08 RX ADMIN — ARFORMOTEROL TARTRATE SCH MCG: 15 SOLUTION RESPIRATORY (INHALATION) at 19:45

## 2019-10-08 RX ADMIN — ATORVASTATIN CALCIUM SCH MG: 20 TABLET, FILM COATED ORAL at 21:09

## 2019-10-08 RX ADMIN — PIPERACILLIN SODIUM AND TAZOBACTAM SODIUM SCH MLS: 3; .375 INJECTION, POWDER, LYOPHILIZED, FOR SOLUTION INTRAVENOUS at 08:23

## 2019-10-09 RX ADMIN — INSULIN LISPRO SCH UNIT: 100 INJECTION, SOLUTION INTRAVENOUS; SUBCUTANEOUS at 17:31

## 2019-10-09 RX ADMIN — Medication SCH ML: at 22:13

## 2019-10-09 RX ADMIN — Medication SCH ML: at 08:40

## 2019-10-09 RX ADMIN — PANTOPRAZOLE SODIUM SCH MG: 40 TABLET, DELAYED RELEASE ORAL at 05:55

## 2019-10-09 RX ADMIN — ARFORMOTEROL TARTRATE SCH MCG: 15 SOLUTION RESPIRATORY (INHALATION) at 08:15

## 2019-10-09 RX ADMIN — INSULIN LISPRO SCH UNIT: 100 INJECTION, SOLUTION INTRAVENOUS; SUBCUTANEOUS at 12:10

## 2019-10-09 RX ADMIN — PIPERACILLIN SODIUM AND TAZOBACTAM SODIUM SCH MLS: 3; .375 INJECTION, POWDER, LYOPHILIZED, FOR SOLUTION INTRAVENOUS at 08:33

## 2019-10-09 RX ADMIN — ASPIRIN 81 MG SCH MG: 81 TABLET ORAL at 08:33

## 2019-10-09 RX ADMIN — INSULIN LISPRO SCH UNIT: 100 INJECTION, SOLUTION INTRAVENOUS; SUBCUTANEOUS at 00:22

## 2019-10-09 RX ADMIN — INSULIN LISPRO SCH: 100 INJECTION, SOLUTION INTRAVENOUS; SUBCUTANEOUS at 05:38

## 2019-10-09 RX ADMIN — ATORVASTATIN CALCIUM SCH MG: 20 TABLET, FILM COATED ORAL at 22:12

## 2019-10-09 RX ADMIN — LEVOTHYROXINE SODIUM SCH MG: 88 TABLET ORAL at 05:56

## 2019-10-09 RX ADMIN — PIPERACILLIN SODIUM AND TAZOBACTAM SODIUM SCH MLS: 3; .375 INJECTION, POWDER, LYOPHILIZED, FOR SOLUTION INTRAVENOUS at 00:22

## 2019-10-09 RX ADMIN — PIPERACILLIN SODIUM AND TAZOBACTAM SODIUM SCH MLS: 3; .375 INJECTION, POWDER, LYOPHILIZED, FOR SOLUTION INTRAVENOUS at 17:33

## 2019-10-09 RX ADMIN — ARFORMOTEROL TARTRATE SCH MCG: 15 SOLUTION RESPIRATORY (INHALATION) at 20:05

## 2019-10-09 NOTE — P.PN
Subjective


Date of Service: 10/09/19


[facility]


Chief Complaint: Altered mental status hypoglycemia


Patient noted to have elevated blood sugar today.


He has good oral intake.


He denies shortness of breath.


He has been afebrile.








Physical Examination





- Vital Signs


Temperature: 98.4 F


Blood Pressure: 129/56


Pulse: 84


Respirations: 20


Pulse Ox (%): 95





- Physical Exam


General: Alert, In no apparent distress, Oriented x3


HEENT: Normocephalic, Mucous membr. moist/pink


Neck: Supple, JVD not distended


Respiratory: Clear to auscultation bilaterally, Normal air movement


Cardiovascular: No edema, Regular rate/rhythm, Normal S1 S2


Capillary refill: <2 Seconds


Gastrointestinal: Normal bowel sounds, Soft and benign, Non-distended, No 

tenderness


Integumentary: No rashes





- Studies


Microbiology Data (last 24 hrs): 








10/04/19 11:15   Blood  - Blood   Aerobic Blood Culture - Final


                            No growth in 5 days.


10/04/19 11:15   Blood  - Blood   Anaerobic Blood Culture - Final


10/04/19 11:05   Blood  - Blood   Aerobic Blood Culture - Final


                            No growth in 5 days.


10/04/19 11:05   Blood  - Blood   Anaerobic Blood Culture - Final





Medications List Reviewed: Yes





Assessment And Plan





- Current Problems (Diagnosis)


(1) Aspiration pneumonia


Current Visit: Yes   Status: Acute   





(2) Diabetes mellitus


Onset Date: 06/14/17   Current Visit: No   Status: Chronic   


Qualifiers: 


   Diabetes mellitus type: type 1   Diabetes mellitus complication status: with 

circulatory complication   Diabetes mellitus complication detail: with other 

circulatory complications   Qualified Code(s): E10.59 - Type 1 diabetes 

mellitus with other circulatory complications   





- Plan


Acute metabolic encephalopathy related to hypoglycemia


* Hypoglycemia resolved


* Altered mental status resolved.





Bilateral pneumonia likely aspiration: 


* MRSA positive.


* Overall clinically improved.


* Continue IV Zosyn and oral doxycycline


* He will be transitioned to oral Augmentin and doxycycline on discharge





Diabetes mellitus type 2 insulin-dependent with hypoglycemia:


* Brittle blood sugar.


* Intermittent hypoglycemia with long-acting insulin.


* Patient now with severe hyperglycemia today.


* Restarted Lantus insulin at 5 units and then titrate.


* Monitor blood sugar with insulin sliding scale.


* Diabetic teaching





Hypertension: 


* He is currently normotensive without antihypertensives





Hypothyroidism:  


* Continue home medication. 


GERD:  


* Protonix.


Hyperlipidemia:  


* Continue with home medication.


COPD:  


Continue COPD medication.  Maintain sats above 90%.

## 2019-10-09 NOTE — P.PN
Subjective


Date of Service: 10/08/19


[facility]


Chief Complaint: Altered mental status hypoglycemia


Subjective: No new changes


Patient blood sugar level today within acceptable range.


Longer-acting insulin was discontinued.








Physical Examination





- Vital Signs


Temperature: 98.4 F


Blood Pressure: 129/56


Pulse: 84


Respirations: 20


Pulse Ox (%): 95





- Physical Exam


General: Alert, In no apparent distress, Oriented x3


HEENT: Mucous membr. moist/pink


Neck: Supple, JVD not distended


Respiratory: Clear to auscultation bilaterally, Normal air movement


Cardiovascular: No edema, Normal pulses, Regular rate/rhythm, Normal S1 S2


Capillary refill: <2 Seconds


Gastrointestinal: Normal bowel sounds, Soft and benign, No tenderness


Musculoskeletal: Other (Bilateral AKA)


Integumentary: No rashes


Neurological: Normal strength at 5/5 x4 extr, Cranial nerves 3-12 intact





- Studies


Microbiology Data (last 24 hrs): 








10/04/19 11:15   Blood  - Blood   Aerobic Blood Culture - Final


                            No growth in 5 days.


10/04/19 11:15   Blood  - Blood   Anaerobic Blood Culture - Final


10/04/19 11:05   Blood  - Blood   Aerobic Blood Culture - Final


                            No growth in 5 days.


10/04/19 11:05   Blood  - Blood   Anaerobic Blood Culture - Final





Medications List Reviewed: Yes





Assessment And Plan





- Current Problems (Diagnosis)


(1) Aspiration pneumonia


Current Visit: Yes   Status: Acute   





(2) COPD with acute exacerbation


Current Visit: No   Status: Acute   





(3) Diabetes mellitus


Onset Date: 06/14/17   Current Visit: No   Status: Chronic   


Qualifiers: 


   Diabetes mellitus type: type 1   Diabetes mellitus complication status: with 

circulatory complication   Diabetes mellitus complication detail: with other 

circulatory complications   Qualified Code(s): E10.59 - Type 1 diabetes 

mellitus with other circulatory complications   





- Plan


Acute metabolic encephalopathy related to hypoglycemia


* Hypoglycemia resolved


* Altered mental status resolved.





Bilateral pneumonia likely aspiration: 


* MRSA positive.


* Overall clinically improved.


* Continue IV Zosyn


* Add oral doxycycline to cover MRSA


* He will be transitioned to oral Augmentin and doxycycline on discharge





Diabetes mellitus type 2 insulin-dependent with hypoglycemia:


* Brittle blood sugar.


* Intermittent hypoglycemia with long-acting insulin.


* Monitor blood sugar with insulin sliding scale.


* Hemoglobin A1c is 8.  


* Diabetic teaching


Hypertension: 


* He is currently normotensive without antihypertensives





Hypothyroidism:  


* Continue home medication. 


GERD:  


* Protonix.


Hyperlipidemia:  


* Continue with home medication.


COPD:  


Continue COPD medication.  Maintain sats above 90%.  





Physician Review Additional Text: 





Impression:


Acute metabolic encephalopathy related to hypoglycemia and bilateral pneumonia 

likely aspiration


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


Hypertension


Hypothyroidism


GERD


Hyperlipidemia


Prior lower extremity amputations


COPD





Plan:

## 2019-10-10 VITALS — TEMPERATURE: 97.1 F

## 2019-10-10 VITALS — SYSTOLIC BLOOD PRESSURE: 133 MMHG | DIASTOLIC BLOOD PRESSURE: 60 MMHG

## 2019-10-10 VITALS — OXYGEN SATURATION: 98 %

## 2019-10-10 LAB
BUN BLD-MCNC: 20 MG/DL (ref 7–18)
GLUCOSE SERPLBLD-MCNC: 171 MG/DL (ref 74–106)
POTASSIUM SERPL-SCNC: 3.7 MMOL/L (ref 3.5–5.1)

## 2019-10-10 RX ADMIN — ARFORMOTEROL TARTRATE SCH MCG: 15 SOLUTION RESPIRATORY (INHALATION) at 10:00

## 2019-10-10 RX ADMIN — INSULIN LISPRO SCH: 100 INJECTION, SOLUTION INTRAVENOUS; SUBCUTANEOUS at 06:00

## 2019-10-10 RX ADMIN — INSULIN LISPRO SCH UNIT: 100 INJECTION, SOLUTION INTRAVENOUS; SUBCUTANEOUS at 11:53

## 2019-10-10 RX ADMIN — PANTOPRAZOLE SODIUM SCH MG: 40 TABLET, DELAYED RELEASE ORAL at 06:28

## 2019-10-10 RX ADMIN — Medication SCH ML: at 11:53

## 2019-10-10 RX ADMIN — PIPERACILLIN SODIUM AND TAZOBACTAM SODIUM SCH MLS: 3; .375 INJECTION, POWDER, LYOPHILIZED, FOR SOLUTION INTRAVENOUS at 08:50

## 2019-10-10 RX ADMIN — PIPERACILLIN SODIUM AND TAZOBACTAM SODIUM SCH MLS: 3; .375 INJECTION, POWDER, LYOPHILIZED, FOR SOLUTION INTRAVENOUS at 00:35

## 2019-10-10 RX ADMIN — ASPIRIN 81 MG SCH MG: 81 TABLET ORAL at 08:51

## 2019-10-10 RX ADMIN — INSULIN LISPRO SCH UNIT: 100 INJECTION, SOLUTION INTRAVENOUS; SUBCUTANEOUS at 00:35

## 2019-10-10 RX ADMIN — LEVOTHYROXINE SODIUM SCH MG: 88 TABLET ORAL at 06:28

## 2019-10-10 NOTE — P.DS
Admission Date: 10/04/19


Discharge Date: 10/10/19


[facility]


Disposition: ROUTINE DISCHARGE


Discharge Condition: FAIR


Reason for Admission: Altered mental status hypoglycemia





- Problems


(1) Aspiration pneumonia


Current Visit: Yes   Status: Acute   





(2) Diabetes mellitus


Onset Date: 06/14/17   Current Visit: No   Status: Chronic   


Qualifiers: 


   Diabetes mellitus type: type 1   Diabetes mellitus complication status: with 

circulatory complication   Diabetes mellitus complication detail: with other 

circulatory complications   Qualified Code(s): E10.59 - Type 1 diabetes 

mellitus with other circulatory complications   


Brief History of Present Illness: 


Patient is a 64-year-old male with past medical history of COPD, diabetes, 

bilateral BKA, was found unresponsive with a glucose of 33 at home.  Patient 

was brought into the ER for further evaluation.  He was given of D50 which 

brought his blood sugar up to 130. CT head was unremarkable and showed no acute 

intracranial process.  His chest x-ray reported multiple patchy opacities 

suggestive of aspiration pneumonia. He was started on D5W and admitted for 

further managed.





Hospital Course: 


 Patient admitted to the medical floor, his mental status improved with 

dextrose administration.  Patient was started on IV antibiotics for aspiration 

pneumonia.  His blood cultures yielded no growth. Sputum cultures grew MRSA 

sensitive to Levaquin and tetracycline.  He has been on 5 units of Lantus at 

home along with Novolin for sliding scale.  His blood sugar was very brittle.  

5 units Lantus insulin caused him to develop hypoglycemia.  He was sometimes 

hyperglycemic with the short-acting insulin.  Patient will be discharged with 

short-acting insulin for glucose management. He will need to follow up with an 

[facility].  His pneumonia has improved significantly.  Chest x-

ray showed significant improvement.  He is prescribed Levaquin and doxycycline 

to continue treatment for the MRSA pneumonia.  He has bilateral BKA but able to 

transfer without assistance.  He is deemed clinically stable for discharge.





Vital Signs/Physical Exam: 














Temp Pulse Resp BP Pulse Ox


 


 97.1 F   83   17   133/60   93 


 


 10/10/19 12:00  10/10/19 12:00  10/10/19 12:00  10/10/19 12:00  10/10/19 12:00








General: In no apparent distress, Oriented x3


HEENT: Mucous membr. moist/pink


Neck: Supple, JVD not distended


Respiratory: Clear to auscultation bilaterally, Normal air movement


Cardiovascular: No edema, Regular rate/rhythm, Normal S1 S2, No murmurs


Gastrointestinal: Normal bowel sounds, Soft and benign, Non-distended, No 

tenderness


Musculoskeletal: Other (B/L BKA)


Integumentary: No rashes


Laboratory Data at Discharge: 














WBC  8.1 K/uL (4.3-10.9)   10/06/19  05:30    


 


Hgb  11.4 g/dL (13.6-17.9)  L  10/06/19  05:30    


 


Hct  33.7 % (39.6-49.0)  L  10/06/19  05:30    


 


Plt Count  196 K/uL (152-406)   10/06/19  05:30    


 


PT  11.2 SECONDS (9.5-12.5)   10/04/19  11:15    


 


INR  0.95   10/04/19  11:15    


 


Sodium  141 mmol/L (136-145)   10/10/19  05:43    


 


Potassium  3.7 mmol/L (3.5-5.1)   10/10/19  05:43    


 


BUN  20 mg/dL (7-18)  H  10/10/19  05:43    


 


Creatinine  1.15 mg/dL (0.55-1.3)   10/10/19  05:43    


 


Glucose  171 mg/dL ()  H  10/10/19  05:43    


 


Phosphorus  3.6 mg/dL (2.5-4.9)   10/08/19  05:32    


 


Magnesium  2.0 mg/dL (1.8-2.4)   10/08/19  05:32    


 


Total Bilirubin  0.7 mg/dL (0.2-1.0)   10/06/19  05:30    


 


AST  60 U/L (15-37)  H  10/06/19  05:30    


 


ALT  42 U/L (12-78)   10/06/19  05:30    


 


Alkaline Phosphatase  77 U/L ()   10/06/19  05:30    








Home Medications: 








Albuterol Inhaler [Ventolin Inhaler*] 2 puff IH Q6HR PRN 10/06/19 


Brinzolamide/Brimonidine Tart [Simbrinza 1%-0.2% Eye Drops] 1 drop OPTH BID 10/

06/19 


Budesonide/Formoterol Fumarate [Symbicort 160-4.5 Mcg Inhaler] 2 puff IH BID 10/

06/19 


Cyclopentolate 1% [Cyclogyl 1%*] 1 drop OPTH DAILY PRN 10/06/19 


Levothyroxine [Synthroid*] 1 tab PO [id number] 10/06/19 


Simvastatin 1 tab PO BEDTIME 10/06/19 


Glucagon,Human Recombinant [Glucagon Emergency Kit] 1 mg IJ 1X PRN #1 kit 10/07/

19 


levoFLOXacin [Levaquin] 750 mg PO DAILY #5 tab 10/07/19 


Doxycycline Hyclate 100 mg PO BID 10 Days #20 capsule 10/10/19 


Insulin Lispro [Humalog*] 0 unit SQ ACHS #10 ml 10/10/19 





New Medications: 


Doxycycline Hyclate 100 mg PO BID 10 Days #20 capsule


Glucagon,Human Recombinant [Glucagon Emergency Kit] 1 mg IJ 1X PRN #1 kit


 PRN Reason: Hypoglycemia


Insulin Lispro [Humalog*] 0 unit SQ ACHS #10 ml


levoFLOXacin [Levaquin] 750 mg PO DAILY #5 tab


Patient Discharge Instructions: f/up w PCP in 2-3 days.  Establish care w 

endocrinologist in 2 weeks.  Return to ER for worsening condition


Diet: ADA (DO NOT SKIP MEALS)


Activity: Fall precautions


Time spent managing pt's care (in minutes): 45

## 2020-02-11 ENCOUNTER — HOSPITAL ENCOUNTER (INPATIENT)
Dept: HOSPITAL 97 - ER | Age: 65
LOS: 6 days | Discharge: HOME HEALTH | DRG: 178 | End: 2020-02-17
Attending: FAMILY MEDICINE | Admitting: FAMILY MEDICINE
Payer: COMMERCIAL

## 2020-02-11 VITALS — BODY MASS INDEX: 24.2 KG/M2

## 2020-02-11 DIAGNOSIS — K21.9: ICD-10-CM

## 2020-02-11 DIAGNOSIS — N17.9: ICD-10-CM

## 2020-02-11 DIAGNOSIS — E11.65: ICD-10-CM

## 2020-02-11 DIAGNOSIS — R11.2: ICD-10-CM

## 2020-02-11 DIAGNOSIS — Z87.891: ICD-10-CM

## 2020-02-11 DIAGNOSIS — J69.0: Primary | ICD-10-CM

## 2020-02-11 DIAGNOSIS — J11.00: ICD-10-CM

## 2020-02-11 DIAGNOSIS — J44.0: ICD-10-CM

## 2020-02-11 DIAGNOSIS — Z89.512: ICD-10-CM

## 2020-02-11 DIAGNOSIS — I25.10: ICD-10-CM

## 2020-02-11 DIAGNOSIS — E78.5: ICD-10-CM

## 2020-02-11 DIAGNOSIS — E03.9: ICD-10-CM

## 2020-02-11 DIAGNOSIS — Z89.511: ICD-10-CM

## 2020-02-11 DIAGNOSIS — Z95.5: ICD-10-CM

## 2020-02-11 LAB
ALBUMIN SERPL BCP-MCNC: 3 G/DL (ref 3.4–5)
ALP SERPL-CCNC: 93 U/L (ref 45–117)
ALT SERPL W P-5'-P-CCNC: 18 U/L (ref 12–78)
AMYLASE SERPL-CCNC: 10 U/L (ref 25–115)
AST SERPL W P-5'-P-CCNC: 23 U/L (ref 15–37)
BUN BLD-MCNC: 16 MG/DL (ref 7–18)
CKMB CREATINE KINASE MB: 1.1 NG/ML (ref 0.3–3.6)
GLUCOSE SERPLBLD-MCNC: 267 MG/DL (ref 74–106)
HCT VFR BLD CALC: 42.1 % (ref 39.6–49)
INR BLD: 1.04
LIPASE SERPL-CCNC: 24 U/L (ref 73–393)
LYMPHOCYTES # SPEC AUTO: 1.7 K/UL (ref 0.7–4.9)
PMV BLD: 9.5 FL (ref 7.6–11.3)
POTASSIUM SERPL-SCNC: 3.7 MMOL/L (ref 3.5–5.1)
RBC # BLD: 4.77 M/UL (ref 4.33–5.43)
TROPONIN (EMERG DEPT USE ONLY): < 0.02 NG/ML (ref 0–0.04)
UA COMPLETE W REFLEX CULTURE PNL UR: (no result)

## 2020-02-11 PROCEDURE — 83735 ASSAY OF MAGNESIUM: CPT

## 2020-02-11 PROCEDURE — 99285 EMERGENCY DEPT VISIT HI MDM: CPT

## 2020-02-11 PROCEDURE — 84132 ASSAY OF SERUM POTASSIUM: CPT

## 2020-02-11 PROCEDURE — 87070 CULTURE OTHR SPECIMN AEROBIC: CPT

## 2020-02-11 PROCEDURE — 96365 THER/PROPH/DIAG IV INF INIT: CPT

## 2020-02-11 PROCEDURE — 85025 COMPLETE CBC W/AUTO DIFF WBC: CPT

## 2020-02-11 PROCEDURE — 80048 BASIC METABOLIC PNL TOTAL CA: CPT

## 2020-02-11 PROCEDURE — 85730 THROMBOPLASTIN TIME PARTIAL: CPT

## 2020-02-11 PROCEDURE — 83605 ASSAY OF LACTIC ACID: CPT

## 2020-02-11 PROCEDURE — 96361 HYDRATE IV INFUSION ADD-ON: CPT

## 2020-02-11 PROCEDURE — 83690 ASSAY OF LIPASE: CPT

## 2020-02-11 PROCEDURE — 84484 ASSAY OF TROPONIN QUANT: CPT

## 2020-02-11 PROCEDURE — 81003 URINALYSIS AUTO W/O SCOPE: CPT

## 2020-02-11 PROCEDURE — 71046 X-RAY EXAM CHEST 2 VIEWS: CPT

## 2020-02-11 PROCEDURE — 85610 PROTHROMBIN TIME: CPT

## 2020-02-11 PROCEDURE — 82150 ASSAY OF AMYLASE: CPT

## 2020-02-11 PROCEDURE — 80076 HEPATIC FUNCTION PANEL: CPT

## 2020-02-11 PROCEDURE — 36415 COLL VENOUS BLD VENIPUNCTURE: CPT

## 2020-02-11 PROCEDURE — 87205 SMEAR GRAM STAIN: CPT

## 2020-02-11 PROCEDURE — 80053 COMPREHEN METABOLIC PANEL: CPT

## 2020-02-11 PROCEDURE — 82553 CREATINE MB FRACTION: CPT

## 2020-02-11 PROCEDURE — 87086 URINE CULTURE/COLONY COUNT: CPT

## 2020-02-11 PROCEDURE — 82550 ASSAY OF CK (CPK): CPT

## 2020-02-11 PROCEDURE — 93005 ELECTROCARDIOGRAM TRACING: CPT

## 2020-02-11 PROCEDURE — 82947 ASSAY GLUCOSE BLOOD QUANT: CPT

## 2020-02-11 PROCEDURE — 71045 X-RAY EXAM CHEST 1 VIEW: CPT

## 2020-02-11 PROCEDURE — 87804 INFLUENZA ASSAY W/OPTIC: CPT

## 2020-02-11 PROCEDURE — 96375 TX/PRO/DX INJ NEW DRUG ADDON: CPT

## 2020-02-11 PROCEDURE — 51702 INSERT TEMP BLADDER CATH: CPT

## 2020-02-11 PROCEDURE — 87088 URINE BACTERIA CULTURE: CPT

## 2020-02-11 PROCEDURE — 81015 MICROSCOPIC EXAM OF URINE: CPT

## 2020-02-11 PROCEDURE — 84145 PROCALCITONIN (PCT): CPT

## 2020-02-11 PROCEDURE — 87040 BLOOD CULTURE FOR BACTERIA: CPT

## 2020-02-11 RX ADMIN — OSELTAMIVIR PHOSPHATE SCH MG: 75 CAPSULE ORAL at 20:39

## 2020-02-11 RX ADMIN — HUMAN INSULIN SCH UNIT: 100 INJECTION, SOLUTION SUBCUTANEOUS at 20:50

## 2020-02-11 RX ADMIN — ENOXAPARIN SODIUM SCH MG: 40 INJECTION SUBCUTANEOUS at 17:24

## 2020-02-11 RX ADMIN — GUAIFENESIN SCH MG: 600 TABLET, EXTENDED RELEASE ORAL at 20:39

## 2020-02-11 RX ADMIN — Medication SCH ML: at 20:39

## 2020-02-11 RX ADMIN — ATORVASTATIN CALCIUM SCH MG: 20 TABLET, FILM COATED ORAL at 20:39

## 2020-02-11 RX ADMIN — BENZONATATE PRN MG: 100 CAPSULE ORAL at 17:24

## 2020-02-11 RX ADMIN — SODIUM CHLORIDE SCH MLS: 0.9 INJECTION, SOLUTION INTRAVENOUS at 15:59

## 2020-02-11 RX ADMIN — HUMAN INSULIN SCH: 100 INJECTION, SOLUTION SUBCUTANEOUS at 16:30

## 2020-02-11 RX ADMIN — ARFORMOTEROL TARTRATE SCH MCG: 15 SOLUTION RESPIRATORY (INHALATION) at 20:00

## 2020-02-11 NOTE — EDPHYS
Physician Documentation                                                                           

 [facility]                                                                 

Name: Aj Morocho                                                                                 

Age: 64 yrs                                                                                       

Sex: Male                                                                                         

: 1955                                                                                   

MRN: [id number]                                                                                   

Arrival Date: 2020                                                                          

Time: 12:15                                                                                       

Account#: [id number]                                                                            

Bed 25                                                                                            

Private MD:                                                                                       

ED Physician Dexter Mcdowell                                                                      

HPI:                                                                                              

                                                                                             

13:02 This 64 yrs old  Male presents to ER via EMS with complaints of vomiting,      karissa 

      fever and cough.                                                                            

13:02 The patient presents to the emergency department with nausea, vomiting, diarrhea,       karissa 

      abdominal pain. Onset: The symptoms/episode began/occurred 2 day(s) ago. Possible           

      causes: unknown. The symptoms are aggravated by nothing. The symptoms are alleviated by     

      nothing. fever, weakness, cough, vomiting. The patient or guardian reports cough, that      

      is intermittent, flu symptoms, arthralgias, low-grade fever, myalgias. Severity of          

      symptoms: At their worst the symptoms were mild, moderate, in the emergency department      

      the symptoms are unchanged. Associated signs and symptoms: The patient has no apparent      

      associated signs or symptoms.                                                               

                                                                                                  

Historical:                                                                                       

- Allergies:                                                                                      

12:19 NKA;                                                                                    mg2 

- Home Meds:                                                                                      

12:19 levothyroxine 100 mcg tab 1 tab once daily [Active]; simvastatin 40 mg Oral tab 1 tab   mg2 

      nightly [Active]; insulin [Active]; aspirin 81 mg Oral TbEC 1 tab once daily [Active];      

      metoprolol tartrate 25 mg Oral tab 1 tab 2 times per day [Active];                          

- PMHx:                                                                                           

12:19 COPD; Diabetes - IDDM; High Cholesterol; Hypertension; Hypothyroidism; Myocardial       mg2 

      infarction;                                                                                 

- PSHx:                                                                                           

12:19 bilateral leg amputee;                                                                  mg2 

                                                                                                  

- Immunization history:: Flu vaccine is up to date.                                               

- Coronavirus screen:: The patient has NOT traveled to [place], [place], or [place] in the           

  past 14 days. Proceed with normal triage process as indicated.                                  

- Social history:: Smoking status: Patient denies any tobacco usage or history of.                

- Family history:: not pertinent.                                                                 

- Ebola Screening: : No symptoms or risks identified at this time.                                

                                                                                                  

                                                                                                  

ROS:                                                                                              

13:02 Constitutional: Negative for fever, chills, and weight loss, Eyes: Negative for injury, karissa 

      pain, redness, and discharge, ENT: Negative for injury, pain, and discharge, Neck:          

      Negative for injury, pain, and swelling, Cardiovascular: Negative for chest pain,           

      palpitations, and edema, Back: Negative for injury and pain, : Negative for injury,       

      bleeding, discharge, and swelling, MS/Extremity: Negative for injury and deformity,         

      Skin: Negative for injury, rash, and discoloration, Psych: Negative for depression,         

      anxiety, suicide ideation, homicidal ideation, and hallucinations, Allergy/Immunology:      

      Negative for hives, rash, and allergies, Endocrine: Negative for neck swelling,             

      polydipsia, polyuria, polyphagia, and marked weight changes, Hematologic/Lymphatic:         

      Negative for swollen nodes, abnormal bleeding, and unusual bruising.                        

13:02 Respiratory: Positive for cough, shortness of breath, at rest.                              

13:02 Abdomen/GI: Positive for nausea and vomiting.                                               

13:02 Neuro: Positive for weakness.                                                               

                                                                                                  

Exam:                                                                                             

13:02 Head/Face:  Normocephalic, atraumatic. Eyes:  Pupils equal round and reactive to light, karissa 

      extra-ocular motions intact.  Lids and lashes normal.  Conjunctiva and sclera are           

      non-icteric and not injected.  Cornea within normal limits.  Periorbital areas with no      

      swelling, redness, or edema. ENT:  Nares patent. No nasal discharge, no septal              

      abnormalities noted.  Tympanic membranes are normal and external auditory canals are        

      clear.  Oropharynx with no redness, swelling, or masses, exudates, or evidence of           

      obstruction, uvula midline.  Mucous membranes moist. Neck:  Trachea midline, no             

      thyromegaly or masses palpated, and no cervical lymphadenopathy.  Supple, full range of     

      motion without nuchal rigidity, or vertebral point tenderness.  No Meningismus.             

      Chest/axilla:  Normal chest wall appearance and motion.  Nontender with no deformity.       

      No lesions are appreciated. Respiratory:  Lungs have equal breath sounds bilaterally,       

      clear to auscultation and percussion.  No rales, rhonchi or wheezes noted.  No              

      increased work of breathing, no retractions or nasal flaring. Back:  No spinal              

      tenderness.  No costovertebral tenderness.  Full range of motion. Male :  Normal          

      genitalia with no discharge or lesions. Skin:  Warm, dry with normal turgor.  Normal        

      color with no rashes, no lesions, and no evidence of cellulitis. MS/ Extremity:  Pulses     

      equal, no cyanosis.  Neurovascular intact.  Full, normal range of motion. Neuro:  Awake     

      and alert, GCS 15, oriented to person, place, time, and situation.  Cranial nerves          

      II-XII grossly intact.  Motor strength 5/5 in all extremities.  Sensory grossly intact.     

       Cerebellar exam normal.  Normal gait. Psych:  Awake, alert, with orientation to            

      person, place and time.  Behavior, mood, and affect are within normal limits.               

13:02 Cardiovascular: Rate: tachycardic, Rhythm: regular, Pulses: Pulses are 4+ in bilateral      

      radial, brachial, femoral, popliteal, posterior tibial and and dorsalis pedis               

      arteries.. Heart sounds: normal, JVD: is not appreciated.                                   

13:02 Musculoskeletal/extremity: Circulation is intact in all extremities. Sensation intact.      

      Compartment Syndrome exam of affected extremity: is normal. DVT Exam: no pain, no           

      swelling, no tenderness, negative Homans' sign noted on exam, no appreciated bluish         

      discoloration, no erythema, no increased warmth, stumps, not infected.                      

                                                                                                  

Vital Signs:                                                                                      

12:17  / 69; Pulse 122; Resp 18; Temp 100.3; Pulse Ox 92% on R/A; Weight 49.44 kg;      mg2 

13:25  / 91; Pulse 122; Resp 20; Pulse Ox 100% on Nebulizer Mask;                       mg2 

15:00  / 54; Pulse 128; Resp 18; Temp 100.7(O); Pulse Ox 96% on 2 lpm NC;               mg2 

                                                                                                  

MDM:                                                                                              

12:22 Patient medically screened.                                                             [facility] 

13:06 Data reviewed: vital signs, nurses notes, lab test result(s), EKG, radiologic studies,  karissa 

      plain films.                                                                                

                                                                                                  

                                                                                             

12:27 Order name: Amylase, Serum                                                              mg2 

                                                                                             

12:27 Order name: Basic Metabolic Panel                                                       mg2 

                                                                                             

12:27 Order name: Blood Culture Adult (2)                                                     mg2 

                                                                                             

12:27 Order name: CBC with Diff                                                               mg2 

                                                                                             

12:27 Order name: Ckmb                                                                        mg2 

                                                                                             

12:27 Order name: CPK                                                                         mg2 

                                                                                             

12:27 Order name: Lactate                                                                     mg2 

                                                                                             

12:27 Order name: LFT's                                                                       mg2 

                                                                                             

12:27 Order name: Lipase                                                                      mg2 

                                                                                             

12:27 Order name: Procalcitonin                                                               mg2 

                                                                                             

12:27 Order name: Protime (+inr)                                                              mg2 

                                                                                             

12:27 Order name: Ptt, Activated                                                              mg2 

                                                                                             

12:27 Order name: Troponin (emerg Dept Use Only)                                              mg2 

                                                                                             

12:27 Order name: Urine Microscopic Only                                                      [facility] 

                                                                                             

12:31 Order name: Basic Metabolic Panel                                                       [facility] 

                                                                                             

12:31 Order name: CBC with Diff                                                               [facility] 

                                                                                             

12:31 Order name: LFT's                                                                       [facility] 

                                                                                             

12:31 Order name: Magnesium                                                                   [facility] 

                                                                                             

12:31 Order name: NT PRO-BNP                                                                  [facility] 

                                                                                             

12:31 Order name: PT-INR                                                                      [facility] 

                                                                                             

12:31 Order name: Troponin (emerg Dept Use Only)                                              [facility] 

                                                                                             

12:31 Order name: Lipase                                                                      [facility] 

                                                                                             

12:31 Order name: Lactate                                                                     [facility] 

                                                                                             

12:31 Order name: Urine Culture                                                               [facility] 

                                                                                             

12:31 Order name: Influenza Screen (a \T\ B)                                                    [facility]

                                                                                             

12:59 Order name: Glucose, Ancillary Testing; Complete Time: 13:06                            [facility]

                                                                                             

13:24 Order name: CBC with Automated Diff; Complete Time: 13:29                               [facility]

                                                                                             

13:24 Order name: Influenza Screen (A ; Complete Time: 13:29                                  [facility]

                                                                                             

13:29 Order name: Urine Dipstick--Ancillary (enter results)                                     

                                                                                             

13:30 Order name: Lactate; Complete Time: 13:39                                               [facility]

                                                                                             

12:27 Order name: Chest Single View XRAY                                                      [facility] 

                                                                                             

12:27 Order name: Accucheck; Complete Time: 13:08                                             [facility] 

                                                                                             

12:31 Order name: XRAY Chest (1 view)                                                         [facility] 

                                                                                             

12:31 Order name: EKG; Complete Time: 12:32                                                   [facility] 

                                                                                             

13:30 Order name: Protime (+INR); Complete Time: 13:39                                        [facility]

                                                                                             

13:30 Order name: PTT, Activated Partial Thromb; Complete Time: 13:39                         [facility]

                                                                                             

13:37 Order name: Urine Dipstick-Ancillary; Complete Time: 13:39                              [facility]MS

                                                                                             

13:45 Order name: Basic Metabolic Panel; Complete Time: 14:38                                 [facility]MS

                                                                                             

13:45 Order name: Liver (Hepatic) Function; Complete Time: 14:38                              EDMS

                                                                                             

13:45 Order name: Creatine Phosphokinase; Complete Time: 14:38                                EDMS

                                                                                             

13:45 Order name: CKMB Creatine Kinase MB; Complete Time: 14:38                               EDMS

                                                                                             

13:46 Order name: Troponin (Emerg Dept Use Only); Complete Time: 14:38                        EDMS

                                                                                             

13:46 Order name: Amylase Level; Complete Time: 14:38                                         EDMS

                                                                                             

13:46 Order name: Lipase; Complete Time: 14:38                                                EDMS

                                                                                             

13:46 Order name: Urine Microscopic Only; Complete Time: 14:38                                EDMS

                                                                                             

13:47 Order name: Procalcitonin; Complete Time: 14:38                                         EDMS

                                                                                             

13:59 Order name: RAD; Complete Time: 14:38                                                   EDMS

                                                                                             

12:27 Order name: Cardiac monitoring; Complete Time: 13:08                                    mg2 

                                                                                             

12:27 Order name: EKG - Nurse/Tech; Complete Time: 13:                                      [facility] 

                                                                                             

12:27 Order name: IV Saline Lock - Large Bore; Complete Time: 13:                           mg2 

                                                                                             

12:27 Order name: Labs collected and sent; Complete Time: 13:                               [facility] 

                                                                                             

12:27 Order name: O2 Per Protocol; Complete Time: 13:                                       [facility] 

                                                                                             

12:27 Order name: O2 Sat Monitoring; Complete Time: 13:                                     mg2 

                                                                                             

12:27 Order name: Urine Dipstick-Ancillary (obtain specimen); Complete Time: 13:28            mg2 

                                                                                             

12:31 Order name: Cardiac monitoring; Complete Time: 13:                                    karissa 

                                                                                             

12:31 Order name: EKG - Nurse/Tech; Complete Time: 13:                                      karissa 

                                                                                             

12:31 Order name: IV Saline Lock; Complete Time: 13:08                                        karissa 

                                                                                             

12:31 Order name: Labs collected and sent; Complete Time: 13:                               karissa 

                                                                                             

12:31 Order name: O2 Per Protocol; Complete Time: 13:                                       karissa 

                                                                                             

12:31 Order name: O2 Sat Monitoring; Complete Time: 13:                                     karissa 

                                                                                             

12:31 Order name: Urine Dipstick-Ancillary (obtain specimen); Complete Time: 13:27            [facility] 

                                                                                                  

Administered Medications:                                                                         

12:43 Drug: NS 0.9% 1000 ml Route: IV; Rate: 1 bolus; Site: left forearm;                     ss  

15:24 Follow up: Response: No adverse reaction; IV Status: Completed infusion; IV Intake:     mg2 

      1000ml                                                                                      

13:03 Drug: NS 0.9% 500 ml Route: IV; Rate: bolus; Site: left forearm;                        mg2 

15:20 Follow up: Response: No adverse reaction; IV Status: Completed infusion; IV Intake:     mg2 

      500ml                                                                                       

13:03 Drug: Rocephin 1 grams Route: IV; Rate: per protocol; Site: left forearm;               mg2 

15:08 Follow up: Response: No adverse reaction; IV Status: Completed infusion                 mg2 

13:09 Drug: Tylenol 650 mg Route: PO;                                                         ss  

15:07 Follow up: Response: No adverse reaction                                                mg2 

13:17 Drug: Xopenex 2.5 mg Route: Inhalation;                                                 mg2 

13:17 Drug: AtroVENT Aerosol 0.5 mg Route: Inhalation;                                        mg2 

15:17 Follow up: Response: No adverse reaction                                                mg2 

13:27 Not Given (Duplicate Order): NS 0.9% (30 ml/kg) 30 ml/kg IV at bolus once; Sepsis       mg2 

      Protocol                                                                                    

13:50 Drug: Zofran 4 mg Route: IVP; Site: left forearm;                                       mg2 

15:05 Follow up: Response: No adverse reaction                                                mg2 

13:55 Drug: Pepcid 20 mg Route: IVP; Site: left forearm;                                      mg2 

15:03 Follow up: Response: No adverse reaction                                                mg2 

14:09 Drug: Zithromax 500 mg Route: IVPB; Infused Over: 1 hrs; Site: left forearm;            mg2 

15:06 Follow up: Response: No adverse reaction; IV Status: Completed infusion; IV Intake:     mg2 

      250ml                                                                                       

14:09 Drug: SOLU-Medrol 2 mg/kg Route: IVP; Site: left forearm;                               mg2 

15:06 Follow up: Response: No adverse reaction                                                mg2 

14:09 Drug: Xopenex 1.25 mg Route: Inhalation;                                                mg2 

14:10 Drug: Tamiflu 75 mg Route: PO;                                                          mg2 

15:07 Follow up: Response: No adverse reaction                                                mg2 

14:13 Drug: NS 0.9% 1000 ml Route: IV; Rate: 125 ml/hr; Site: left forearm;                   mg2 

15:25 Follow up: Response: No adverse reaction; IV Status: Infusion continued upon admission  mg2 

15:04 Drug: Motrin 400 mg Route: PO;                                                          mg2 

15:16 Follow up: Response: No adverse reaction                                                mg2 

                                                                                                  

                                                                                                  

Disposition:                                                                                      

20 13:10 Hospitalization ordered by Ramsey Kenney for Inpatient Admission. Preliminary     

  diagnosis are Weakness, Vomiting, Fever, unspecified, Type 1 diabetes                           

  mellitus, Chronic obstructive pulmonary disease, unspecified, Volume                            

  depletion, Influenza due to identified novel influenza A virus.                                 

- Bed requested for Telemetry/MedSurg (Inpatient).                                                

- Status is Inpatient Admission.                                                              mg2 

- Condition is Stable.                                                                            

- Problem is new.                                                                                 

- Symptoms have improved.                                                                         

                                                                                                  

                                                                                                  

                                                                                                  

Signatures:                                                                                       

Dispatcher [employer]                           EDMS                                                 

Kathryn Humphries                              Dexter Garza MD MD cha Smirch, Shelby, RN                      RN   ss                                                   

Alexis Hall RN                    RN   mg2                                                  

                                                                                                  

Corrections: (The following items were deleted from the chart)                                    

13: 13:10 Hospitalization Ordered by Ramsey Kenney DO for Inpatient Admission. Preliminary  [facility] 

      diagnosis is Weakness; Vomiting; Fever, unspecified; Type 1 diabetes mellitus; Chronic      

      obstructive pulmonary disease, unspecified; Volume depletion. Bed requested for             

      Telemetry/MedSurg (Inpatient). Status is Inpatient Admission. Condition is Stable.          

      Problem is new. Symptoms have improved. [facility]                                                 

14:56 13:31 2020 13:10 Hospitalization Ordered by Ramsey Kenney DO for Inpatient        bd  

      Admission. Preliminary diagnosis is Weakness; Vomiting; Fever, unspecified; Type 1          

      diabetes mellitus; Chronic obstructive pulmonary disease, unspecified; Volume               

      depletion; Influenza due to identified novel influenza A virus. Bed requested for           

      Telemetry/MedSurg (Inpatient). Status is Inpatient Admission. Condition is Stable.          

      Problem is new. Symptoms have improved. [facility]                                                 

15:37 14:56 2020 13:10 Hospitalization Ordered by Ramsey Kenney DO for Inpatient        mg2 

      Admission. Preliminary diagnosis is Weakness; Vomiting; Fever, unspecified; Type 1          

      diabetes mellitus; Chronic obstructive pulmonary disease, unspecified; Volume               

      depletion; Influenza due to identified novel influenza A virus. Bed requested for           

      Telemetry/MedSurg (Inpatient). Status is Inpatient Admission. Condition is Stable.          

      Problem is new. Symptoms have improved. bd                                                  

                                                                                                  

**************************************************************************************************

## 2020-02-11 NOTE — P.HP
Certification for Inpatient


Patient admitted to: Observation


With expected LOS: <2 Midnights


Patient will require the following post-hospital care: None


Practitioner: I am a practitioner with admitting privileges, knowledge of 

patient current condition, hospital course, and medical plan of care.


Services: Services provided to patient in accordance with Admission 

requirements found in Title 42 Section 412.3 of the Code of Federal Regulations





Patient History


Date of Service: 02/11/20


[facility]


Reason for admission: Cough, body aches


History of Present Illness: 





64-year-old  male with history of COPD, diabetes, and below-knee 

amputations.





Patient presented with cough, congestion, and runny nose.  Symptoms have 

started over the past week.  Symptoms have been getting worse.  Multiple 

members of the family have been sick.  He start to have some body aches today 

with mild fever.  Some shortness of breath also noted. He came to the ER for 

further evaluation.





In the ER patient was slightly tachypneic.  Room-air saturations within normal 

range.  Chest x-ray showed no evidence of pneumonia.  White count 9.6, 

hemoglobin 14. Platelet count of 195. Lactic acid within normal range.  

Urinalysis unremarkable.  Blood sugar 258. Patient was positive for influenza 

A.  Patient was admitted for further evaluation and observation.





When I saw the patient ER, he reported some nausea and vomiting.  Body aches, 

weakness and fatigue also noted.


Allergies





No Known Drug Allergies Allergy (Verified 09/12/19 23:11)


 Unknown


No Known Allergies Allergy (Uncoded 09/12/19 23:11)


 Unknown





Home medications list reviewed: Yes


Home Medications: 








Albuterol Inhaler [Ventolin Inhaler*] 2 puff IH Q6HR PRN 10/06/19 


Brinzolamide/Brimonidine Tart [Simbrinza 1%-0.2% Eye Drops] 1 drop OPTH BID 10/

06/19 


Budesonide/Formoterol Fumarate [Symbicort 160-4.5 Mcg Inhaler] 2 puff IH BID 10/

06/19 


Cyclopentolate 1% [Cyclogyl 1%*] 1 drop OPTH DAILY PRN 10/06/19 


Levothyroxine [Synthroid*] 1 tab PO [id number] 10/06/19 


Simvastatin 1 tab PO BEDTIME 10/06/19 


Glucagon,Human Recombinant [Glucagon Emergency Kit] 1 mg IJ 1X PRN #1 kit 10/07/

19 


levoFLOXacin [Levaquin] 750 mg PO DAILY #5 tab 10/07/19 


Doxycycline Hyclate 100 mg PO BID 10 Days #20 capsule 10/10/19 


Insulin Lispro [Humalog*] 0 unit SQ ACHS #10 ml 10/10/19 








- Past Medical/Surgical History


Diabetic: Yes


-: Hyperlipidemia


-: Diabetes mellitus type 2 insulin-dependent


-: Hypothyroidism


-: COPD


-: History of bilateral lower extremity amputations


-: Arthritis


-: PAD


-: CAD with prior stents


-: Cataract sx


-: Hemmorhoidectomy


-: L AKA 2005


-: Rt BKA


-: Prior stents


Psychosocial/ Personal History: Patient lives at home with son.





- Family History


  ** Mother


-: Heart disease, Diabetes





- Social History


Smoking Status: Former smoker


Alcohol use: No


CD- Drugs: No


Caffeine use: Yes


Place of Residence: Home





Review of Systems


General: Fever, Chills, Weakness, Malaise


Eyes: As per HPI


ENT: Nose Congestion, As per HPI


Respiratory: Shortness of Breath, As per HPI


Cardiovascular: As per HPI


Gastrointestinal: Nausea, Vomiting, As per HPI


Genitourinary: Unremarkable


Musculoskeletal: Unremarkable


Integumentary: Unremarkable


Neurological: Unremarkable


Lymphatics: Unremarkable





Physical Examination





- Physical Exam


General: Alert, Oriented x3, Cooperative, Mild distress, Other (Patient with 

nausea and vomiting upon examination.)


HEENT: Atraumatic, Other (Dry mucous membranes noted)


Neck: Supple


Respiratory: Clear to auscultation bilaterally, Normal air movement


Cardiovascular: Normal pulses, Regular rate/rhythm


Gastrointestinal: Normal bowel sounds, Soft and benign, Non-distended, No 

tenderness, No masses, No rebound, No guarding


Musculoskeletal: No contractures, No erythema, No tenderness, No warmth


Integumentary: Other (Bilateral lower extremity amputations)


Neurological: Normal speech, Normal strength at 5/5 x4 extr, Normal tone, 

Normal affect





- Studies


Laboratory Data (last 24 hrs)





02/11/20 13:06: WBC 9.6, Hgb 14.1, Hct 42.1, Plt Count 195


02/11/20 12:58: PT 12.2, INR 1.04, APTT 26.7


02/11/20 12:58: Sodium 139, Potassium 3.7, BUN 16, Creatinine 1.06, Glucose 267 

H, Total Bilirubin 0.5, AST 23, ALT 18, Alkaline Phosphatase 93, Amylase 10 L, 

Lipase 24 L





Microbiology Data (last 24 hrs): 








02/11/20 12:58   Nasopharnyx   Influenza Type A Antigen Screen - Final


02/11/20 12:58   Nasopharnyx   Influenza Type B Antigen Screen - Final








Assessment and Plan





- Plan





Impression:


Arthralgias, cough and shortness of breath secondary to Influenza A


Nausea and vomiting likely related to viral syndrome


COPD


Diabetes mellitus type 2 insulin-dependent with hyperglycemia


Hypothyroidism


GERD


History of bilateral lower extremity amputations





Plan:


Arthralgias, cough and shortness of breath secondary to Influenza A:  Patient 

admitted for further evaluation and treatment.  Will continue with IV fluids, 

antiemetic therapy.  Patient will be started on Tamiflu.  Will monitor chest x-

ray.  Will provide medication for cough, congestion. Will start DVT prophylaxis-

Lovenox.  Will continue monitor and reassess.  Will provide medication for his 

COPD.  Anticipate discharge in the next 48 hr pending clinical improvement.


Nausea and vomiting likely related to viral syndrome:  Will provide antiemetic 

therapy.  Will also start GERD medication.  Will start with a clear liquid diet 

and advance as tolerated.


COPD:  No exacerbation noted at this time.  Continue with COPD medication.


Diabetes mellitus type 2 insulin-dependent with hyperglycemia:  Will monitor 

Accu-Cheks.  Will provide sliding scale as needed.


Hypothyroidism:  Continue with home medication.


GERD:  Due to nausea vomiting will start Protonix IV.


History of bilateral lower extremity amputations:  Continue with aspirin.  

Patient on DVT prophylaxis-Lovenox.


Discharge Plan: Home


Plan to discharge in: 48 Hours





- Advance Directives


Does patient have a Living Will: Yes


Does patient have a Durable POA for Healthcare: Yes





- Code Status/Comfort Care


Code Status Assessed: Yes (Patient is full code)


Time Spent Managing Pts Care (In Minutes): 55

## 2020-02-11 NOTE — EKG
Test Date:    2020-02-11               Test Time:    13:13:51

Technician:                                        

                                                     

MEASUREMENT RESULTS:                                       

Intervals:                                           

Rate:         124                                    

CT:           130                                    

QRSD:         98                                     

QT:           328                                    

QTc:          471                                    

Axis:                                                

P:            74                                     

CT:           130                                    

QRS:          163                                    

T:            72                                     

                                                     

INTERPRETIVE STATEMENTS:                                       

                                                     

Sinus tachycardia

Biatrial enlargement

Right axis deviation

Pulmonary disease pattern

Incomplete right bundle branch block

Nonspecific ST abnormality

Abnormal ECG

Compared to ECG 10/04/2019 10:24:51

Right-axis deviation now present

Incomplete right bundle-branch block now present

ST (T wave) deviation now present

Right superior axis no longer present

Myocardial infarct finding no longer present



Electronically Signed On 02-11-20 18:44:11 CST by Duglas Ricardo

## 2020-02-11 NOTE — ER
Nurse's Notes                                                                                     

 [facility] [place] [facility]\""                                                                 

Name: Aj Morocho                                                                                 

Age: 64 yrs                                                                                       

Sex: Male                                                                                         

: 1955                                                                                   

MRN: [id number]                                                                                   

Arrival Date: 2020                                                                          

Time: 12:15                                                                                       

Account#: [id number]                                                                            

Bed 25                                                                                            

Private MD:                                                                                       

Diagnosis: Weakness;Vomiting;Fever, unspecified;Type 1 diabetes mellitus;Chronic obstructive      

  pulmonary disease, unspecified;Volume depletion;Influenza due to identified novel               

  influenza A virus                                                                               

                                                                                                  

Presentation:                                                                                     

                                                                                             

12:15 Presenting complaint: EMS states: he has n/v, weakness and low grade fever since last   mg2 

      night.  mg/dl./103. Transition of care: patient was not received from          

      another setting of care. Onset of symptoms was 2020. Risk Assessment: Do       

      you want to hurt yourself or someone else? Patient reports no desire to harm self or        

      others. Initial Sepsis Screen: Does the patient meet any 2 criteria? No. Patient's          

      initial sepsis screen is negative. Does the patient have a suspected source of              

      infection? No. Patient's initial sepsis screen is negative. Care prior to arrival: None.    

12:15 Method Of Arrival: EMS: [facility]                                                       mg2 

12:15 Acuity: OZZY 3                                                                           mg2 

                                                                                                  

Historical:                                                                                       

- Allergies:                                                                                      

12:19 NKA;                                                                                    mg2 

- Home Meds:                                                                                      

12:19 levothyroxine 100 mcg tab 1 tab once daily [Active]; simvastatin 40 mg Oral tab 1 tab   mg2 

      nightly [Active]; insulin [Active]; aspirin 81 mg Oral TbEC 1 tab once daily [Active];      

      metoprolol tartrate 25 mg Oral tab 1 tab 2 times per day [Active];                          

- PMHx:                                                                                           

12:19 COPD; Diabetes - IDDM; High Cholesterol; Hypertension; Hypothyroidism; Myocardial       mg2 

      infarction;                                                                                 

- PSHx:                                                                                           

12:19 bilateral leg amputee;                                                                  mg2 

                                                                                                  

- Immunization history:: Flu vaccine is up to date.                                               

- Coronavirus screen:: The patient has NOT traveled to [place], [place], or [place] in the           

  past 14 days. Proceed with normal triage process as indicated.                                  

- Social history:: Smoking status: Patient denies any tobacco usage or history of.                

- Family history:: not pertinent.                                                                 

- Ebola Screening: : No symptoms or risks identified at this time.                                

                                                                                                  

                                                                                                  

Screenin:56 Abuse screen: Denies threats or abuse. Denies injuries from another. Nutritional        mg2 

      screening: No deficits noted. Tuberculosis screening: No symptoms or risk factors           

      identified. Fall Risk Secondary diagnosis (15 points) impaired mobility, IV access (20      

      points). Gait- Impaired (20 pts.).                                                          

                                                                                                  

Assessment:                                                                                       

13:24 General: Appears in no apparent distress. comfortable, Behavior is calm, cooperative.   mg2 

      Pain: Complains of pain in abdomen. Neuro: Level of Consciousness is awake, alert,          

      obeys commands, Oriented to person, place, time, situation. Cardiovascular: Capillary       

      refill < 3 seconds Patient's skin is warm and dry. Respiratory: Reports cough that is       

      Airway is patent Respiratory effort is even, unlabored, Respiratory pattern is regular,     

      symmetrical. GI: Reports normal bowel habits, vomiting. : EENT: No signs and/or           

      symptoms were reported regarding the EENT system. Derm: Skin is intact, is healthy with     

      good turgor, Skin is pink, warm \T\ dry. normal. Musculoskeletal: Circulation, motion,      

      and sensation intact. Capillary refill < 3 seconds, Reports weakness in whole body.         

                                                                                                  

Vital Signs:                                                                                      

12:17  / 69; Pulse 122; Resp 18; Temp 100.3; Pulse Ox 92% on R/A; Weight 49.44 kg;      mg2 

13:25  / 91; Pulse 122; Resp 20; Pulse Ox 100% on Nebulizer Mask;                       mg2 

15:00  / 54; Pulse 128; Resp 18; Temp 100.7(O); Pulse Ox 96% on 2 lpm NC;               mg2 

                                                                                                  

ED Course:                                                                                        

12:15 Patient arrived in ED.                                                                  mg2 

12:17 Triage completed.                                                                       mg2 

12:18 Arm band placed on.                                                                     mg2 

12:22 Dexter Mcdowell MD is Attending Physician.                                             karissa 

12:26 Alexis Hall, WILLIAM is Primary Nurse.                                                  mg2 

12:30 Inserted saline lock: 20 gauge in left forearm, using aseptic technique. Blood          mg2 

      collected.                                                                                  

12:57 No provider procedures requiring assistance completed.                                  mg2 

13:08 Ramsey Kenney DO is Hospitalizing Provider.                                           karissa 

13:25 Patient has correct armband on for positive identification. Cardiac monitor on. Pulse   mg2 

      ox on. NIBP on. Door closed.                                                                

13:30 Straight cath inserted, using sterile technique, Returned clear yellow urine. Patient   mg2 

      tolerated well. 400 ml.                                                                     

15:25 Patient admitted, IV remains in place.                                                  mg2 

                                                                                                  

Administered Medications:                                                                         

12:43 Drug: NS 0.9% 1000 ml Route: IV; Rate: 1 bolus; Site: left forearm;                     ss  

15:24 Follow up: Response: No adverse reaction; IV Status: Completed infusion; IV Intake:     mg2 

      1000ml                                                                                      

13:03 Drug: NS 0.9% 500 ml Route: IV; Rate: bolus; Site: left forearm;                        mg2 

15:20 Follow up: Response: No adverse reaction; IV Status: Completed infusion; IV Intake:     mg2 

      500ml                                                                                       

13:03 Drug: Rocephin 1 grams Route: IV; Rate: per protocol; Site: left forearm;               mg2 

15:08 Follow up: Response: No adverse reaction; IV Status: Completed infusion                 mg2 

13:09 Drug: Tylenol 650 mg Route: PO;                                                         ss  

15:07 Follow up: Response: No adverse reaction                                                mg2 

13:17 Drug: Xopenex 2.5 mg Route: Inhalation;                                                 mg2 

13:17 Drug: AtroVENT Aerosol 0.5 mg Route: Inhalation;                                        mg2 

15:17 Follow up: Response: No adverse reaction                                                mg2 

13:27 Not Given (Duplicate Order): NS 0.9% (30 ml/kg) 30 ml/kg IV at bolus once; Sepsis       mg2 

      Protocol                                                                                    

13:50 Drug: Zofran 4 mg Route: IVP; Site: left forearm;                                       mg2 

15:05 Follow up: Response: No adverse reaction                                                mg2 

13:55 Drug: Pepcid 20 mg Route: IVP; Site: left forearm;                                      mg2 

15:03 Follow up: Response: No adverse reaction                                                mg2 

14:09 Drug: Zithromax 500 mg Route: IVPB; Infused Over: 1 hrs; Site: left forearm;            mg2 

15:06 Follow up: Response: No adverse reaction; IV Status: Completed infusion; IV Intake:     mg2 

      250ml                                                                                       

14:09 Drug: SOLU-Medrol 2 mg/kg Route: IVP; Site: left forearm;                               mg2 

15:06 Follow up: Response: No adverse reaction                                                mg2 

14:09 Drug: Xopenex 1.25 mg Route: Inhalation;                                                mg2 

14:10 Drug: Tamiflu 75 mg Route: PO;                                                          mg2 

15:07 Follow up: Response: No adverse reaction                                                mg2 

14:13 Drug: NS 0.9% 1000 ml Route: IV; Rate: 125 ml/hr; Site: left forearm;                   mg2 

15:25 Follow up: Response: No adverse reaction; IV Status: Infusion continued upon admission  mg2 

15:04 Drug: Motrin 400 mg Route: PO;                                                          mg2 

15:16 Follow up: Response: No adverse reaction                                                mg2 

                                                                                                  

                                                                                                  

Intake:                                                                                           

15:06 IV: 250ml; Total: 250ml.                                                                mg2 

15:20 IV: 500ml; Total: 750ml.                                                                mg2 

15:24 IV: 1000ml; Total: 1750ml.                                                              mg2 

                                                                                                  

Outcome:                                                                                          

13:10 Decision to Hospitalize by Provider.                                                    karissa 

15:26 Admitted to Med/surg accompanied by tech, via stretcher, room 225, with oxygen, with    mg2 

      chart, Report called to  WILLIAM Isidro                                                        

15:26 Condition: stable                                                                           

15:26 Instructed on the need for admit, Demonstrated understanding of instructions.               

15:37 Patient left the ED.                                                                    mg2 

                                                                                                  

Signatures:                                                                                       

Dexter Mcdowell MD MD cha Smirch, Shelby, RN RN ss Gardose, Michele, RN                    RN   mg2                                                  

                                                                                                  

**************************************************************************************************

## 2020-02-11 NOTE — XMS REPORT
Patient Summary Document

 Created on:2020



Patient:HEIDI MCCLAIN

Sex:Male

:1955

External Reference #:[id number]





Demographics







 Address  [address]



   [place] [address]

 

 Home Phone  [phone]

 

 Email Address  NONE

 

 Preferred Language  Unknown

 

 Marital Status  Unknown

 

 Congregational Affiliation  Unknown

 

 Race  Unknown

 

 Additional Race(s)  Unavailable

 

 Ethnic Group  Unknown









Author







 Organization  [facility]

 

 Address  [address]



   [place] [address]

 

 Phone  [phone]









Care Team Providers







 Name  Role  Phone

 

 LOVELY CORDON  Unavailable  Unavailable









Problems

This patient has no known problems.



Allergies, Adverse Reactions, Alerts

This patient has no known allergies or adverse reactions.



Medications

This patient has no known medications.



Results







 Test Description  Test Time  Test Comments  Text Results  Atomic Results  
Result Comments









 POCT-GLUCOSE METER  2017 11:26:00    









   Test Item  Value  Reference Range  Comments









 POC-GLUCOSE METER (BEAKER) (test  268 mg/dL    [facility] 
[facility]



 fkdh=[id number][facility]      [facility] [address]



POCT-GLUCOSE [id number] 07:49:00





 Test Item  Value  Reference Range  Comments

 

 POC-GLUCOSE METER (BEAKER)  219 mg/dL    TESTED AT [facility] [address] [facility]



 (test rqxj=2520)      [facility] [address]



BASIC METABOLIC [id number] 07:26:00





 Test Item  Value  Reference Range  Comments

 

 SODIUM (BEAKER) (test  134 meq/L  136-145  



 ewix=038)      

 

 POTASSIUM (BEAKER) (test  3.6 meq/L  3.5-5.1  



 code=379)      

 

 CHLORIDE (BEAKER) (test  103 meq/L    



 code=382)      

 

 CO2 (BEAKER) (test  23 meq/L  22-29  



 code=355)      

 

 BLOOD UREA NITROGEN  21 mg/dL  7-21  



 (BEAKER) (test code=354)      

 

 CREATININE (BEAKER) (test  1.23 mg/dL  0.57-1.25  



 code=358)      

 

 GLUCOSE RANDOM (BEAKER)  212 mg/dL    



 (test code=652)      

 

 CALCIUM (BEAKER) (test  9.0 mg/dL  8.4-10.2  



 code=697)      

 

 EGFR (BEAKER) (test  60 mL/min/1.73 sq m    ESTIMATED GFR IS NOT AS



 code=1092)      ACCURATE AS CREATININE



       CLEARANCE IN PREDICTING



       GLOMERULAR FILTRATION



       RATE. ESTIMATED GFR IS



       NOT APPLICABLE FOR



       DIALYSIS PATIENTS.



CBC W/PLT COUNT &amp; AUTO [id number] 06:02:00





 Test Item  Value  Reference Range  Comments

 

 WHITE BLOOD CELL COUNT (BEAKER) (test code=775)  6.5 K/ L  4.0-10.0  

 

 RED BLOOD CELL COUNT (BEAKER) (test code=761)  3.90 M/ L  4.20-5.80  

 

 HEMOGLOBIN (BEAKER) (test code=410)  12.0 GM/DL  13.0-16.8  

 

 HEMATOCRIT (BEAKER) (test code=411)  36.0 %  40.0-50.0  

 

 MEAN CORPUSCULAR VOLUME (BEAKER) (test code=753)  92.2 fL  82.0-98.0  

 

 MEAN CORPUSCULAR HEMOGLOBIN (BEAKER) (test  30.8 pg  27.0-33.0  



 code=751)      

 

 MEAN CORPUSCULAR HEMOGLOBIN CONC (BEAKER) (test  33.4 GM/DL  32.0-36.0  



 code=752)      

 

 RED CELL DISTRIBUTION WIDTH (BEAKER) (test  12.2 %  10.3-14.2  



 code=412)      

 

 PLATELET COUNT (BEAKER) (test code=756)  187 K/CU MM  150-430  

 

 MEAN PLATELET VOLUME (BEAKER) (test code=754)  8.0 fL  6.5-10.5  

 

 NUCLEATED RED BLOOD CELLS (BEAKER) (test  0 /100 WBC  0-0  



 code=413)      

 

 NEUTROPHILS RELATIVE PERCENT (BEAKER) (test  58 %    



 code=429)      

 

 LYMPHOCYTES RELATIVE PERCENT (BEAKER) (test  23 %    



 code=430)      

 

 MONOCYTES RELATIVE PERCENT (BEAKER) (test  11 %    



 code=431)      

 

 EOSINOPHILS RELATIVE PERCENT (BEAKER) (test  7 %    



 code=432)      

 

 BASOPHILS RELATIVE PERCENT (BEAKER) (test  1 %    



 code=437)      

 

 NEUTROPHILS ABSOLUTE COUNT (BEAKER) (test  3.78 K/ L  1.80-8.00  



 code=670)      

 

 LYMPHOCYTES ABSOLUTE COUNT (BEAKER) (test  1.52 K/ L  1.48-4.50  



 code=414)      

 

 MONOCYTES ABSOLUTE COUNT (BEAKER) (test  0.73 K/ L  0.00-1.30  



 code=415)      

 

 EOSINOPHILS ABSOLUTE COUNT (BEAKER) (test  0.48 K/ L  0.00-0.50  



 code=416)      

 

 BASOPHILS ABSOLUTE COUNT (BEAKER) (test  0.04 K/ L  0.00-0.20  



 code=417)      



0.00POCT-GLUCOSE METER2017-06-15 23:23:00





 Test Item  Value  Reference Range  Comments

 

 POC-GLUCOSE METER (BEAKER)  339 mg/dL    TESTED AT [address]



 (test fsyu=1566)      [facility] [address]



POCT-GLUCOSE METER2017-06-15 19:55:00





 Test Item  Value  Reference Range  Comments

 

 POC-GLUCOSE METER (BEAKER)  366 mg/dL    Will Repeat Test/TESTED AT



 (test code=1538)      [address]



       [address]



POCT-GLUCOSE METER2017-06-15 18:14:00





 Test Item  Value  Reference Range  Comments

 

 POC-GLUCOSE METER (BEAKER)  410 mg/dL    TESTED AT [address]



 (test raat=9263)      [facility] [address]



POCT-GLUCOSE METER2017-06-15 16:43:00





 Test Item  Value  Reference Range  Comments

 

 POC-GLUCOSE METER (BEAKER)  403 mg/dL    Notified RN MD/TESTED AT [facility]



 (test code=1538)      [address] [place] [id number]



CBC W/PLT COUNT &amp; AUTO DIFFERENTIAL2017-06-15 11:17:00





 Test Item  Value  Reference Range  Comments

 

 WHITE BLOOD CELL COUNT (BEAKER) (test code=775)  8.5 K/ L  4.0-10.0  

 

 RED BLOOD CELL COUNT (BEAKER) (test code=761)  4.34 M/ L  4.20-5.80  

 

 HEMOGLOBIN (BEAKER) (test code=410)  13.1 GM/DL  13.0-16.8  

 

 HEMATOCRIT (BEAKER) (test code=411)  40.4 %  40.0-50.0  

 

 MEAN CORPUSCULAR VOLUME (BEAKER) (test code=753)  93.1 fL  82.0-98.0  

 

 MEAN CORPUSCULAR HEMOGLOBIN (BEAKER) (test  30.1 pg  27.0-33.0  



 code=751)      

 

 MEAN CORPUSCULAR HEMOGLOBIN CONC (BEAKER) (test  32.3 GM/DL  32.0-36.0  



 code=752)      

 

 RED CELL DISTRIBUTION WIDTH (BEAKER) (test  13.4 %  10.3-14.2  



 code=412)      

 

 PLATELET COUNT (BEAKER) (test code=756)  190 K/CU MM  150-430  

 

 MEAN PLATELET VOLUME (BEAKER) (test code=754)  8.3 fL  6.5-10.5  

 

 NUCLEATED RED BLOOD CELLS (BEAKER) (test  0 /100 WBC  0-0  



 code=413)      

 

 NEUTROPHILS RELATIVE PERCENT (BEAKER) (test  68 %    



 code=429)      

 

 LYMPHOCYTES RELATIVE PERCENT (BEAKER) (test  18 %    



 code=430)      

 

 MONOCYTES RELATIVE PERCENT (BEAKER) (test  7 %    



 code=431)      

 

 EOSINOPHILS RELATIVE PERCENT (BEAKER) (test  5 %    



 code=432)      

 

 BASOPHILS RELATIVE PERCENT (BEAKER) (test  1 %    



 code=437)      

 

 NEUTROPHILS ABSOLUTE COUNT (BEAKER) (test  5.84 K/ L  1.80-8.00  



 code=670)      

 

 LYMPHOCYTES ABSOLUTE COUNT (BEAKER) (test  1.57 K/ L  1.48-4.50  



 code=414)      

 

 MONOCYTES ABSOLUTE COUNT (BEAKER) (test  0.62 K/ L  0.00-1.30  



 code=415)      

 

 EOSINOPHILS ABSOLUTE COUNT (BEAKER) (test  0.44 K/ L  0.00-0.50  



 code=416)      

 

 BASOPHILS ABSOLUTE COUNT (BEAKER) (test  0.06 K/ L  0.00-0.20  



 code=417)      



0.00POCT-GLUCOSE METER2017-06-15 08:34:00





 Test Item  Value  Reference Range  Comments

 

 POC-GLUCOSE METER (BEAKER)  293 mg/dL    TESTED AT [facility] [address] [facility]



 (test xxrv=9973)      [facility] [address]



CBC W/PLT COUNT &amp; AUTO DIFFERENTIAL2017-06-15 07:38:00





 Test Item  Value  Reference Range  Comments

 

 WHITE BLOOD CELL COUNT (BEAKER)   K/ L  4.0-10.0  clotThis is a corrected



 (test code=775)      result. Previous result was



       0.0 K/ L on 6/15/2017 at



       0733 CDT

 

 RED BLOOD CELL COUNT (BEAKER)   M/ L  4.20-5.80  clotThis is a corrected



 (test code=761)      result. Previous result was



       2.30 M/ L on 6/15/2017 at



       0733 CDT

 

 HEMOGLOBIN (BEAKER) (test   GM/DL  13.0-16.8  clotThis is a corrected



 code=410)      result. Previous result was



       9.8 GM/DL on 6/15/2017 at



       [address] [place], [place] [address]T

 

 HEMATOCRIT (BEAKER) (test   %  40.0-50.0  clotThis is a corrected



 code=411)      result. Previous result was



       21.4 % on 6/15/2017 at [address] [place] [address]

 

 MEAN CORPUSCULAR VOLUME   fL  82.0-98.0  clotThis is a corrected



 (BEAKER) (test code=753)      result. Previous result was



       92.8 fL on 6/15/2017 at [address] [place], [place] [address]

 

 MEAN CORPUSCULAR HEMOGLOBIN   pg  27.0-33.0  clotThis is a corrected



 (BEAKER) (test code=751)      result. Previous result was



       42.5 pg on 6/15/2017 at [address] [place] [address]

 

 MEAN CORPUSCULAR HEMOGLOBIN   GM/DL  32.0-36.0  clotThis is a corrected



 CONC (BEAKER) (test code=752)      result. Previous result was



       45.8 GM/DL on 6/15/2017 at



       [address] [place], [place] [address]

 

 RED CELL DISTRIBUTION WIDTH   %  10.3-14.2  clotThis is a corrected



 (BEAKER) (test code=412)      result. Previous result was



       12.1 % on 6/15/2017 at [address] [place], [place] [address]

 

 PLATELET COUNT (BEAKER) (test   K/CU MM  150-430  clotThis is a corrected



 code=756)      result. Previous result was



       168 K/CU MM on 6/15/2017 at



       [address] [place] [address]

 

 MEAN PLATELET VOLUME (BEAKER)   fL  6.5-10.5  clotThis is a corrected



 (test code=754)      result. Previous result was



       8.1 fL on 6/15/2017 at [address] [place], [place] [address]

 

 NUCLEATED RED BLOOD CELLS   /100 WBC  0-0  This is a corrected result.



 (BEAKER) (test code=413)      Previous result was 0 /100



       WBC on 6/15/2017 at [address] [place], [place] [address]T



[id number] METABOLIC PANEL2017-06-15 06:27:00





 Test Item  Value  Reference Range  Comments

 

 SODIUM (BEAKER) (test  135 meq/L  136-145  



 wyhi=574)      

 

 POTASSIUM (BEAKER) (test  4.6 meq/L  3.5-5.1  Specimen slightly



 code=379)      hemolyzed

 

 CHLORIDE (BEAKER) (test  106 meq/L    



 code=382)      

 

 CO2 (BEAKER) (test  17 meq/L  22-29  



 code=355)      

 

 BLOOD UREA NITROGEN  20 mg/dL  7-21  



 (BEAKER) (test code=354)      

 

 CREATININE (BEAKER) (test  0.94 mg/dL  0.57-1.25  Specimen slightly



 code=358)      hemolyzed

 

 GLUCOSE RANDOM (BEAKER)  237 mg/dL    



 (test code=652)      

 

 CALCIUM (BEAKER) (test  9.1 mg/dL  8.4-10.2  



 code=697)      

 

 EGFR (BEAKER) (test  82 mL/min/1.73 sq m    ESTIMATED GFR IS NOT AS



 code=1092)      ACCURATE AS CREATININE



       CLEARANCE IN PREDICTING



       GLOMERULAR FILTRATION



       RATE. ESTIMATED GFR IS



       NOT APPLICABLE FOR



       DIALYSIS PATIENTS.



POCT-GLUCOSE [id number] 21:00:00





 Test Item  Value  Reference Range  Comments

 

 POC-GLUCOSE METER (BEAKER)  203 mg/dL    TESTED AT [facility] [address]



 (test uppd=8554)      [facility] [address]

## 2020-02-11 NOTE — RAD REPORT
EXAM DESCRIPTION:  RAD - Chest Single View - 2/11/2020 1:19 pm

 

CLINICAL HISTORY:  COUGH

Chest pain.

 

COMPARISON:  Chest Single View dated 10/5/2019; Chest Single View dated 10/4/2019; Chest Single View 
dated 9/12/2019; Chest Single View dated 8/15/2019

 

FINDINGS:  Portable technique limits examination quality.

 

The lungs are emphysematous but grossly clear. The heart is normal in size. No displaced fractures.

 

IMPRESSION:  No acute intrathoracic process suspected.

## 2020-02-12 LAB
BUN BLD-MCNC: 20 MG/DL (ref 7–18)
GLUCOSE SERPLBLD-MCNC: 269 MG/DL (ref 74–106)
HCT VFR BLD CALC: 37.1 % (ref 39.6–49)
LYMPHOCYTES # SPEC AUTO: 0.8 K/UL (ref 0.7–4.9)
MAGNESIUM SERPL-MCNC: 2.1 MG/DL (ref 1.8–2.4)
PMV BLD: 9.9 FL (ref 7.6–11.3)
POTASSIUM SERPL-SCNC: 4.1 MMOL/L (ref 3.5–5.1)
RBC # BLD: 4.21 M/UL (ref 4.33–5.43)

## 2020-02-12 RX ADMIN — BENZONATATE PRN MG: 100 CAPSULE ORAL at 20:27

## 2020-02-12 RX ADMIN — OSELTAMIVIR PHOSPHATE SCH MG: 75 CAPSULE ORAL at 20:27

## 2020-02-12 RX ADMIN — FOLIC ACID SCH MG: 1 TABLET ORAL at 08:38

## 2020-02-12 RX ADMIN — ATORVASTATIN CALCIUM SCH MG: 20 TABLET, FILM COATED ORAL at 20:27

## 2020-02-12 RX ADMIN — HUMAN INSULIN SCH UNIT: 100 INJECTION, SOLUTION SUBCUTANEOUS at 12:04

## 2020-02-12 RX ADMIN — Medication SCH: at 08:40

## 2020-02-12 RX ADMIN — ASPIRIN SCH MG: 81 TABLET, COATED ORAL at 08:39

## 2020-02-12 RX ADMIN — Medication SCH: at 21:00

## 2020-02-12 RX ADMIN — ENOXAPARIN SODIUM SCH MG: 40 INJECTION SUBCUTANEOUS at 08:38

## 2020-02-12 RX ADMIN — Medication SCH ML: at 20:28

## 2020-02-12 RX ADMIN — SODIUM CHLORIDE SCH MLS: 0.45 INJECTION, SOLUTION INTRAVENOUS at 18:47

## 2020-02-12 RX ADMIN — OSELTAMIVIR PHOSPHATE SCH MG: 75 CAPSULE ORAL at 08:39

## 2020-02-12 RX ADMIN — HUMAN INSULIN SCH: 100 INJECTION, SOLUTION SUBCUTANEOUS at 21:00

## 2020-02-12 RX ADMIN — Medication SCH ML: at 08:39

## 2020-02-12 RX ADMIN — LEVOTHYROXINE SODIUM SCH MG: 88 TABLET ORAL at 05:18

## 2020-02-12 RX ADMIN — GUAIFENESIN SCH MG: 600 TABLET, EXTENDED RELEASE ORAL at 20:27

## 2020-02-12 RX ADMIN — GUAIFENESIN SCH MG: 600 TABLET, EXTENDED RELEASE ORAL at 08:39

## 2020-02-12 RX ADMIN — BENZONATATE PRN MG: 100 CAPSULE ORAL at 05:17

## 2020-02-12 RX ADMIN — SODIUM CHLORIDE SCH MLS: 0.9 INJECTION, SOLUTION INTRAVENOUS at 01:14

## 2020-02-12 RX ADMIN — ARFORMOTEROL TARTRATE SCH MCG: 15 SOLUTION RESPIRATORY (INHALATION) at 07:54

## 2020-02-12 RX ADMIN — Medication SCH MG: at 08:39

## 2020-02-12 RX ADMIN — INSULIN GLARGINE SCH UNITS: 100 INJECTION, SOLUTION SUBCUTANEOUS at 08:39

## 2020-02-12 RX ADMIN — SODIUM CHLORIDE SCH MLS: 0.45 INJECTION, SOLUTION INTRAVENOUS at 09:50

## 2020-02-12 RX ADMIN — HUMAN INSULIN SCH UNIT: 100 INJECTION, SOLUTION SUBCUTANEOUS at 17:19

## 2020-02-12 RX ADMIN — ARFORMOTEROL TARTRATE SCH MCG: 15 SOLUTION RESPIRATORY (INHALATION) at 20:15

## 2020-02-12 RX ADMIN — HUMAN INSULIN SCH UNIT: 100 INJECTION, SOLUTION SUBCUTANEOUS at 08:40

## 2020-02-12 NOTE — RAD REPORT
EXAM DESCRIPTION:  RAD - Chest Single View - 2/12/2020 5:49 am

 

CLINICAL HISTORY:  cough

 

COMPARISON:  Chest Single View dated 2/11/2020; Chest Single View dated 10/5/2019

 

TECHNIQUE:  AP portable chest image was obtained 2/12/2020 5:49 am .

 

FINDINGS:  No new focal lung parenchymal process. Interstitial pattern has not changed. Heart and vas
culature are normal. No measurable pleural effusion and no pneumothorax. No acute bony abnormality se
en. No acute aortic findings suspected.

 

IMPRESSION:  No acute cardiopulmonary process.

 

Stable chest from February 11.

## 2020-02-12 NOTE — P.PN
Subjective


Date of Service: 02/12/20


Primary Care Provider: [facility]


Chief Complaint: Cough, body aches


Subjective: Improving (Patient has improved.  Still with some weakness.)





Physical Examination





- Vital Signs


Temperature: 97.6 F


Blood Pressure: 128/48


Pulse: 48


Respirations: 18


Pulse Ox (%): 96





- Physical Exam


General: Alert, In no apparent distress, Cooperative


HEENT: Atraumatic


Neck: Supple


Respiratory: Clear to auscultation bilaterally, Normal air movement


Cardiovascular: Normal pulses, Regular rate/rhythm


Gastrointestinal: Normal bowel sounds, Soft and benign, Non-distended


Musculoskeletal: No erythema, No tenderness, No warmth


Neurological: Normal speech, Normal strength at 5/5 x4 extr, Normal tone





- Studies


Laboratory Data (last 24 hrs)





02/11/20 13:06: WBC 9.6, Hgb 14.1, Hct 42.1, Plt Count 195


02/11/20 12:58: PT 12.2, INR 1.04, APTT 26.7


02/11/20 12:58: Sodium 139, Potassium 3.7, BUN 16, Creatinine 1.06, Glucose 267 

H, Total Bilirubin 0.5, AST 23, ALT 18, Alkaline Phosphatase 93, Amylase 10 L, 

Lipase 24 L


02/11/20 12:31: PT Cancelled, INR Cancelled


02/11/20 12:31: WBC Cancelled, Hgb Cancelled, Hct Cancelled, Plt Count Cancelled


02/11/20 12:31: Sodium Cancelled, Potassium Cancelled, BUN Cancelled, 

Creatinine Cancelled, Glucose Cancelled, Magnesium Cancelled, Total Bilirubin 

Cancelled, AST Cancelled, ALT Cancelled, Alkaline Phosphatase Cancelled, Lipase 

Cancelled





Microbiology Data (last 24 hrs): 








02/11/20 12:58   Nasopharnyx   Influenza Type A Antigen Screen - Final


02/11/20 12:58   Nasopharnyx   Influenza Type B Antigen Screen - Final





Medications List Reviewed: Yes





Assessment & Plan


Discharge Plan: Home


Plan to discharge in: 24 Hours


Physician Review Additional Text: 








Impression:


Arthralgias, cough and shortness of breath secondary to Influenza A


Nausea and vomiting likely related to viral syndrome


Acute renal injury


COPD


Diabetes mellitus type 2 insulin-dependent with hyperglycemia


Hypothyroidism


GERD


History of bilateral lower extremity amputations





Plan:


Arthralgias, cough and shortness of breath secondary to Influenza A:  Patient 

has improved.  Continue with IV fluids and Tamiflu.  Patient on DVT 

prophylaxis.  Will provide medication for cough and congestion. Will provide 

incentive spirometer.  Anticipate improvement over the next 24 hr.  Likely home 

discharge tomorrow.  


Nausea and vomiting likely related to viral syndrome:  Will provide antiemetic 

therapy.  Continue with GERD medication.  Patient without nausea this morning.  

Will advance diet to 2000 ADA diet.


Acute renal injury:  Continue IV fluids.  Will monitor closely.  Electrolyte 

protocol in place.


COPD:  No exacerbation noted at this time.  Continue with COPD medication.


Diabetes mellitus type 2 insulin-dependent with hyperglycemia:  Will monitor 

Accu-Cheks.  Will provide sliding scale as needed.


Hypothyroidism:  Continue with home medication.


GERD:  Will change Protonix to oral.


History of bilateral lower extremity amputations:  Continue with aspirin.  

Patient on DVT prophylaxis-Lovenox.


Time Spent Managing Pts Care (In Minutes): 55

## 2020-02-13 LAB
ALBUMIN SERPL BCP-MCNC: 2.2 G/DL (ref 3.4–5)
ALP SERPL-CCNC: 59 U/L (ref 45–117)
ALT SERPL W P-5'-P-CCNC: 27 U/L (ref 12–78)
AST SERPL W P-5'-P-CCNC: 142 U/L (ref 15–37)
BUN BLD-MCNC: 23 MG/DL (ref 7–18)
BUN BLD-MCNC: 26 MG/DL (ref 7–18)
GLUCOSE SERPLBLD-MCNC: 102 MG/DL (ref 74–106)
GLUCOSE SERPLBLD-MCNC: 148 MG/DL (ref 74–106)
HCT VFR BLD CALC: 31.9 % (ref 39.6–49)
HCT VFR BLD CALC: 36 % (ref 39.6–49)
LYMPHOCYTES # SPEC AUTO: 1 K/UL (ref 0.7–4.9)
LYMPHOCYTES # SPEC AUTO: 2.4 K/UL (ref 0.7–4.9)
MAGNESIUM SERPL-MCNC: 1.9 MG/DL (ref 1.8–2.4)
MORPHOLOGY BLD-IMP: (no result)
PMV BLD: 9.4 FL (ref 7.6–11.3)
PMV BLD: 9.7 FL (ref 7.6–11.3)
POTASSIUM SERPL-SCNC: 3.7 MMOL/L (ref 3.5–5.1)
POTASSIUM SERPL-SCNC: 3.9 MMOL/L (ref 3.5–5.1)
RBC # BLD: 3.63 M/UL (ref 4.33–5.43)
RBC # BLD: 4.05 M/UL (ref 4.33–5.43)

## 2020-02-13 RX ADMIN — SODIUM CHLORIDE SCH MLS: 0.45 INJECTION, SOLUTION INTRAVENOUS at 16:29

## 2020-02-13 RX ADMIN — Medication SCH MG: at 08:30

## 2020-02-13 RX ADMIN — ARFORMOTEROL TARTRATE SCH MCG: 15 SOLUTION RESPIRATORY (INHALATION) at 20:15

## 2020-02-13 RX ADMIN — ENOXAPARIN SODIUM SCH MG: 40 INJECTION SUBCUTANEOUS at 08:31

## 2020-02-13 RX ADMIN — ARFORMOTEROL TARTRATE SCH MCG: 15 SOLUTION RESPIRATORY (INHALATION) at 08:00

## 2020-02-13 RX ADMIN — HUMAN INSULIN SCH UNIT: 100 INJECTION, SOLUTION SUBCUTANEOUS at 21:47

## 2020-02-13 RX ADMIN — OSELTAMIVIR PHOSPHATE SCH MG: 75 CAPSULE ORAL at 08:33

## 2020-02-13 RX ADMIN — Medication SCH: at 08:31

## 2020-02-13 RX ADMIN — Medication SCH ML: at 21:47

## 2020-02-13 RX ADMIN — LEVOTHYROXINE SODIUM SCH MG: 88 TABLET ORAL at 06:20

## 2020-02-13 RX ADMIN — FOLIC ACID SCH MG: 1 TABLET ORAL at 08:30

## 2020-02-13 RX ADMIN — ATORVASTATIN CALCIUM SCH MG: 20 TABLET, FILM COATED ORAL at 21:47

## 2020-02-13 RX ADMIN — ASPIRIN SCH MG: 81 TABLET, COATED ORAL at 08:30

## 2020-02-13 RX ADMIN — HUMAN INSULIN SCH UNIT: 100 INJECTION, SOLUTION SUBCUTANEOUS at 16:26

## 2020-02-13 RX ADMIN — Medication SCH ML: at 08:31

## 2020-02-13 RX ADMIN — Medication SCH: at 21:00

## 2020-02-13 RX ADMIN — HUMAN INSULIN SCH: 100 INJECTION, SOLUTION SUBCUTANEOUS at 07:30

## 2020-02-13 RX ADMIN — SODIUM CHLORIDE SCH MLS: 0.45 INJECTION, SOLUTION INTRAVENOUS at 06:19

## 2020-02-13 RX ADMIN — GUAIFENESIN SCH MG: 600 TABLET, EXTENDED RELEASE ORAL at 21:47

## 2020-02-13 RX ADMIN — PIPERACILLIN SODIUM AND TAZOBACTAM SODIUM SCH MLS: 3; .375 INJECTION, POWDER, LYOPHILIZED, FOR SOLUTION INTRAVENOUS at 16:27

## 2020-02-13 RX ADMIN — GUAIFENESIN SCH MG: 600 TABLET, EXTENDED RELEASE ORAL at 08:30

## 2020-02-13 RX ADMIN — HUMAN INSULIN SCH UNIT: 100 INJECTION, SOLUTION SUBCUTANEOUS at 11:32

## 2020-02-13 RX ADMIN — PANTOPRAZOLE SODIUM SCH MG: 40 TABLET, DELAYED RELEASE ORAL at 06:20

## 2020-02-13 RX ADMIN — INSULIN GLARGINE SCH UNITS: 100 INJECTION, SOLUTION SUBCUTANEOUS at 08:30

## 2020-02-13 RX ADMIN — OSELTAMIVIR PHOSPHATE SCH MG: 75 CAPSULE ORAL at 21:47

## 2020-02-13 RX ADMIN — PIPERACILLIN SODIUM AND TAZOBACTAM SODIUM SCH MLS: 3; .375 INJECTION, POWDER, LYOPHILIZED, FOR SOLUTION INTRAVENOUS at 12:14

## 2020-02-13 RX ADMIN — BENZONATATE PRN MG: 100 CAPSULE ORAL at 06:20

## 2020-02-13 NOTE — RAD REPORT
EXAM DESCRIPTION:  RAD - Chest Pa And Lat (2 Views) - 2/13/2020 9:55 am

 

CLINICAL HISTORY:  follow up Flu

 

COMPARISON:  Chest Single View dated 2/12/2020; Chest Single View dated 2/11/2020

 

TECHNIQUE:  Frontal and lateral views of the chest were obtained.

 

FINDINGS:  The lungs are fibrotic as a baseline. No consolidated parenchyma seen. Interstitial patter
n is increased in the lower right lung field.   Heart size is normal and central vasculature is withi
n normal limits.  No pleural effusion or pneumothorax seen.  No acute bony finding noted.  No aortic 
abnormality.

 

IMPRESSION:  Right base interstitial pneumonia or interstitial edema superimposed on chronic intersti
tial lung disease.

## 2020-02-13 NOTE — P.PN
Subjective


Date of Service: 02/13/20


[facility]


Chief Complaint: Cough, body aches


Subjective: Other (Patient had low blood pressure last night.  Patient still 

feeling slightly fatigued.)





Physical Examination





- Vital Signs


Temperature: 97 F


Blood Pressure: 127/59


Pulse: 73


Respirations: 20


Pulse Ox (%): 94





- Physical Exam


General: Alert, In no apparent distress, Oriented x3, Cooperative


HEENT: Atraumatic


Neck: Supple


Respiratory: Crackles/rales (Slight crackles to the right base)


Cardiovascular: Normal pulses, Regular rate/rhythm


Gastrointestinal: Normal bowel sounds, Soft and benign, Non-distended


Neurological: Normal speech, Normal strength at 5/5 x4 extr, Normal tone, 

Normal affect





- Studies


Laboratory Data (last 24 hrs)





02/13/20 05:01: Sodium 139, Potassium 3.9, BUN 23 H, Creatinine 1.04, Glucose 

148 H, Magnesium 1.9


02/13/20 05:01: WBC 11.4 H D, Hgb 11.8 L, Hct 36.0 L, Plt Count 170


02/13/20 00:41: Sodium 139, Potassium 3.7, BUN 26 H, Creatinine 1.20, Glucose 

102, Total Bilirubin 0.2,  H D, ALT 27, Alkaline Phosphatase 59


02/13/20 00:41: WBC 9.1, Hgb 10.7 L, Hct 31.9 L, Plt Count 162





Microbiology Data (last 24 hrs): 








02/11/20 13:20   Clean Catch Urine   [place] Count - Final


                            <10,000 CFU/ML.


02/11/20 13:20   Clean Catch Urine    - Final


                            No growth.


02/11/20 12:58   Blood  - Blood   Aerobic Blood Culture - Final


02/11/20 12:58   Blood  - Blood   Blood Culture Gram Stain - Final


02/11/20 12:58   Blood  - Blood   Anaerobic Blood Culture - Final


02/11/20 12:58   Blood  - Blood   Anaerobic Blood Culture - Final





Medications List Reviewed: Yes





Assessment & Plan


Discharge Plan: Home


Plan to discharge in: 24 Hours


Physician Review Additional Text: 








Impression:


Arthralgias, cough and shortness of breath secondary to Influenza A now with 

likely superimposed right lower lobe pneumonia suspect aspiration


Nausea and vomiting likely related to viral syndrome


Acute renal injury


COPD


Diabetes mellitus type 2 insulin-dependent with hyperglycemia


Hypothyroidism


GERD


History of bilateral lower extremity amputations





Plan:


Arthralgias, cough and shortness of breath secondary to Influenza A now with 

likely superimposed right lower lobe pneumonia suspect aspiration:  Patient had 

low blood pressure last night.  Patient given IV fluid bolus.  Pro calcitonin 

now slightly elevated.  Continue IV fluids.  Chest x-ray shows possible 

superimposed right lower lobe pneumonia.  Will start Zosyn.  This may be 

aspiration related.  Aspiration precaution in place.  Will continue with IV 

fluids, Tamiflu, DVT prophylaxis, and medication for cough and congestion. 

Anticipate discharge in the next 24-48 hr pending clinical improvement. 


Nausea and vomiting likely related to viral syndrome:  Will provide antiemetic 

therapy.  Continue with GERD medication.  Patient without nausea this morning.  

Will advance diet to 2000 ADA diet.


Acute renal injury:  Continue IV fluids.  Will monitor closely.  Electrolyte 

protocol in place.


COPD:  No exacerbation noted at this time.  Continue with COPD medication.


Diabetes mellitus type 2 insulin-dependent with hyperglycemia:  Will monitor 

Accu-Cheks.  Will provide sliding scale as needed.


Hypothyroidism:  Continue with home medication.


GERD:  Will change Protonix to oral.


History of bilateral lower extremity amputations:  Continue with aspirin.  

Patient on DVT prophylaxis-Lovenox.


Time Spent Managing Pts Care (In Minutes): 55

## 2020-02-14 LAB
BUN BLD-MCNC: 15 MG/DL (ref 7–18)
GLUCOSE SERPLBLD-MCNC: 126 MG/DL (ref 74–106)
HCT VFR BLD CALC: 35.1 % (ref 39.6–49)
LYMPHOCYTES # SPEC AUTO: 2.3 K/UL (ref 0.7–4.9)
MAGNESIUM SERPL-MCNC: 1.8 MG/DL (ref 1.8–2.4)
PMV BLD: 10 FL (ref 7.6–11.3)
POTASSIUM SERPL-SCNC: 3.2 MMOL/L (ref 3.5–5.1)
RBC # BLD: 3.97 M/UL (ref 4.33–5.43)

## 2020-02-14 RX ADMIN — OSELTAMIVIR PHOSPHATE SCH MG: 75 CAPSULE ORAL at 20:32

## 2020-02-14 RX ADMIN — ARFORMOTEROL TARTRATE SCH MCG: 15 SOLUTION RESPIRATORY (INHALATION) at 08:45

## 2020-02-14 RX ADMIN — Medication SCH ML: at 09:09

## 2020-02-14 RX ADMIN — SODIUM CHLORIDE SCH: 0.45 INJECTION, SOLUTION INTRAVENOUS at 02:00

## 2020-02-14 RX ADMIN — FOLIC ACID SCH MG: 1 TABLET ORAL at 09:08

## 2020-02-14 RX ADMIN — OSELTAMIVIR PHOSPHATE SCH MG: 75 CAPSULE ORAL at 09:09

## 2020-02-14 RX ADMIN — Medication PRN MG: at 20:31

## 2020-02-14 RX ADMIN — Medication SCH: at 20:32

## 2020-02-14 RX ADMIN — ASPIRIN SCH MG: 81 TABLET, COATED ORAL at 09:08

## 2020-02-14 RX ADMIN — HUMAN INSULIN SCH: 100 INJECTION, SOLUTION SUBCUTANEOUS at 11:30

## 2020-02-14 RX ADMIN — ENOXAPARIN SODIUM SCH MG: 40 INJECTION SUBCUTANEOUS at 09:08

## 2020-02-14 RX ADMIN — LEVOTHYROXINE SODIUM SCH MG: 88 TABLET ORAL at 05:44

## 2020-02-14 RX ADMIN — Medication PRN MG: at 00:30

## 2020-02-14 RX ADMIN — Medication SCH ML: at 20:32

## 2020-02-14 RX ADMIN — Medication SCH: at 09:00

## 2020-02-14 RX ADMIN — HUMAN INSULIN SCH: 100 INJECTION, SOLUTION SUBCUTANEOUS at 07:30

## 2020-02-14 RX ADMIN — GUAIFENESIN SCH MG: 600 TABLET, EXTENDED RELEASE ORAL at 09:08

## 2020-02-14 RX ADMIN — PIPERACILLIN SODIUM AND TAZOBACTAM SODIUM SCH MLS: 3; .375 INJECTION, POWDER, LYOPHILIZED, FOR SOLUTION INTRAVENOUS at 00:27

## 2020-02-14 RX ADMIN — PIPERACILLIN SODIUM AND TAZOBACTAM SODIUM SCH MLS: 3; .375 INJECTION, POWDER, LYOPHILIZED, FOR SOLUTION INTRAVENOUS at 16:08

## 2020-02-14 RX ADMIN — GUAIFENESIN SCH MG: 600 TABLET, EXTENDED RELEASE ORAL at 20:31

## 2020-02-14 RX ADMIN — PIPERACILLIN SODIUM AND TAZOBACTAM SODIUM SCH MLS: 3; .375 INJECTION, POWDER, LYOPHILIZED, FOR SOLUTION INTRAVENOUS at 09:07

## 2020-02-14 RX ADMIN — Medication SCH MG: at 09:08

## 2020-02-14 RX ADMIN — SODIUM CHLORIDE SCH MLS: 0.45 INJECTION, SOLUTION INTRAVENOUS at 03:59

## 2020-02-14 RX ADMIN — BENZONATATE PRN MG: 100 CAPSULE ORAL at 05:47

## 2020-02-14 RX ADMIN — HUMAN INSULIN SCH: 100 INJECTION, SOLUTION SUBCUTANEOUS at 20:32

## 2020-02-14 RX ADMIN — HUMAN INSULIN SCH: 100 INJECTION, SOLUTION SUBCUTANEOUS at 16:30

## 2020-02-14 RX ADMIN — ATORVASTATIN CALCIUM SCH MG: 20 TABLET, FILM COATED ORAL at 20:31

## 2020-02-14 RX ADMIN — ARFORMOTEROL TARTRATE SCH MCG: 15 SOLUTION RESPIRATORY (INHALATION) at 20:05

## 2020-02-14 RX ADMIN — PANTOPRAZOLE SODIUM SCH MG: 40 TABLET, DELAYED RELEASE ORAL at 05:44

## 2020-02-14 RX ADMIN — INSULIN GLARGINE SCH UNITS: 100 INJECTION, SOLUTION SUBCUTANEOUS at 09:07

## 2020-02-14 NOTE — P.PN
Subjective


Date of Service: 02/14/20


[facility]: [facility]


Chief Complaint: Cough, body aches


Subjective: Improving





Physical Examination





- Vital Signs


Temperature: 99.5 F


Blood Pressure: 144/67


Pulse: 80


Respirations: 18


Pulse Ox (%): 95





- Physical Exam


General: Alert, In no apparent distress, Oriented x3, Cooperative


HEENT: Atraumatic


Neck: Supple


Respiratory: Clear to auscultation bilaterally, Other (Better air movement 

bilateral)


Cardiovascular: Normal pulses, Regular rate/rhythm


Gastrointestinal: Normal bowel sounds, Soft and benign, Non-distended


Musculoskeletal: No erythema, No tenderness, No warmth


Neurological: Normal speech, Normal strength at 5/5 x4 extr, Normal tone





- Studies


Microbiology Data (last 24 hrs): 








02/13/20 00:47   Blood  - Blood   Anaerobic Blood Culture - Final


02/13/20 00:41   Blood  - Blood   Anaerobic Blood Culture - Final


02/11/20 13:20   Clean Catch Urine   [place] Count - Final


                            <10,000 CFU/ML.


02/11/20 13:20   Clean Catch Urine    - Final


                            No growth.


02/11/20 12:58   Blood  - Blood   Aerobic Blood Culture - Final


02/11/20 12:58   Blood  - Blood   Blood Culture Gram Stain - Final


02/11/20 12:58   Blood  - Blood   Anaerobic Blood Culture - Final





Medications List Reviewed: Yes





Assessment & Plan


Discharge Plan: Home


Plan to discharge in: 24 Hours


Physician Review Additional Text: 








Impression:


Arthralgias, cough and shortness of breath secondary to Influenza A now with 

likely superimposed right lower lobe pneumonia suspect aspiration


Nausea and vomiting likely related to viral syndrome


Acute renal injury


COPD


Diabetes mellitus type 2 insulin-dependent with hyperglycemia


Hypothyroidism


GERD


History of bilateral lower extremity amputations





Plan:


Arthralgias, cough and shortness of breath secondary to Influenza A now with 

likely superimposed right lower lobe pneumonia suspect aspiration:  Patient 

continues to improve with IV antibiotic therapy.  Continue IV fluids.  

Encourage oral intake.  Will wean off oxygen to maintain sats above 93%.  Will 

reassess later today.  If significantly improved patient can be discharge today 

if not tomorrow. 


Nausea and vomiting likely related to viral syndrome:  Will provide antiemetic 

therapy.  Continue with GERD medication.  Patient without nausea this morning.  

Will advance diet to 2000 ADA diet.


Acute renal injury:  Continue IV fluids.  Will need to transition from IV 

fluids to oral.  Will monitor closely.  Electrolyte protocol in place.


COPD:  No exacerbation noted at this time.  Continue with COPD medication.


Diabetes mellitus type 2 insulin-dependent with hyperglycemia:  Will monitor 

Accu-Cheks.  Will provide sliding scale as needed.


Hypothyroidism:  Continue with home medication.


GERD:  Will change Protonix to oral.


History of bilateral lower extremity amputations:  Continue with aspirin.  

Patient on DVT prophylaxis-Lovenox.


Time Spent Managing Pts Care (In Minutes): 55

## 2020-02-14 NOTE — RAD REPORT
EXAM DESCRIPTION:  [place] Single View2/14/2020 2:57 pm

 

CLINICAL HISTORY:  Shortness of breath

 

COMPARISON:  February 13th

 

FINDINGS:  Worsening in right and development of left basilar opacities.

 

Lungs are hyperaerated.

 

Small pleural effusions suspected

 

Heart is normal size

 

IMPRESSION:  Worsening in right in development of left basilar pulmonary opacities could either repre
sent pulmonary edema or pneumonia

## 2020-02-15 LAB
BUN BLD-MCNC: 11 MG/DL (ref 7–18)
GLUCOSE SERPLBLD-MCNC: 181 MG/DL (ref 74–106)
MAGNESIUM SERPL-MCNC: 2 MG/DL (ref 1.8–2.4)
POTASSIUM SERPL-SCNC: 3.9 MMOL/L (ref 3.5–5.1)

## 2020-02-15 RX ADMIN — FOLIC ACID SCH MG: 1 TABLET ORAL at 08:35

## 2020-02-15 RX ADMIN — DOCUSATE SODIUM SCH MG: 100 CAPSULE, LIQUID FILLED ORAL at 08:36

## 2020-02-15 RX ADMIN — ARFORMOTEROL TARTRATE SCH MCG: 15 SOLUTION RESPIRATORY (INHALATION) at 14:31

## 2020-02-15 RX ADMIN — Medication SCH: at 09:00

## 2020-02-15 RX ADMIN — Medication SCH: at 21:00

## 2020-02-15 RX ADMIN — DOCUSATE SODIUM SCH: 100 CAPSULE, LIQUID FILLED ORAL at 21:00

## 2020-02-15 RX ADMIN — OSELTAMIVIR PHOSPHATE SCH MG: 75 CAPSULE ORAL at 21:56

## 2020-02-15 RX ADMIN — ASPIRIN SCH MG: 81 TABLET, COATED ORAL at 08:35

## 2020-02-15 RX ADMIN — GUAIFENESIN SCH MG: 600 TABLET, EXTENDED RELEASE ORAL at 08:35

## 2020-02-15 RX ADMIN — PIPERACILLIN SODIUM AND TAZOBACTAM SODIUM SCH MLS: 3; .375 INJECTION, POWDER, LYOPHILIZED, FOR SOLUTION INTRAVENOUS at 16:36

## 2020-02-15 RX ADMIN — OSELTAMIVIR PHOSPHATE SCH MG: 75 CAPSULE ORAL at 08:35

## 2020-02-15 RX ADMIN — PIPERACILLIN SODIUM AND TAZOBACTAM SODIUM SCH MLS: 3; .375 INJECTION, POWDER, LYOPHILIZED, FOR SOLUTION INTRAVENOUS at 08:32

## 2020-02-15 RX ADMIN — GUAIFENESIN SCH MG: 600 TABLET, EXTENDED RELEASE ORAL at 21:58

## 2020-02-15 RX ADMIN — Medication SCH MG: at 08:35

## 2020-02-15 RX ADMIN — PIPERACILLIN SODIUM AND TAZOBACTAM SODIUM SCH MLS: 3; .375 INJECTION, POWDER, LYOPHILIZED, FOR SOLUTION INTRAVENOUS at 00:29

## 2020-02-15 RX ADMIN — ENOXAPARIN SODIUM SCH MG: 40 INJECTION SUBCUTANEOUS at 08:36

## 2020-02-15 RX ADMIN — ATORVASTATIN CALCIUM SCH MG: 20 TABLET, FILM COATED ORAL at 21:56

## 2020-02-15 RX ADMIN — LEVOTHYROXINE SODIUM SCH MG: 88 TABLET ORAL at 05:34

## 2020-02-15 RX ADMIN — HUMAN INSULIN SCH: 100 INJECTION, SOLUTION SUBCUTANEOUS at 07:30

## 2020-02-15 RX ADMIN — Medication SCH ML: at 21:56

## 2020-02-15 RX ADMIN — HUMAN INSULIN SCH UNIT: 100 INJECTION, SOLUTION SUBCUTANEOUS at 12:20

## 2020-02-15 RX ADMIN — INSULIN GLARGINE SCH UNITS: 100 INJECTION, SOLUTION SUBCUTANEOUS at 08:33

## 2020-02-15 RX ADMIN — PANTOPRAZOLE SODIUM SCH MG: 40 TABLET, DELAYED RELEASE ORAL at 08:36

## 2020-02-15 RX ADMIN — DOCUSATE SODIUM SCH MG: 100 CAPSULE, LIQUID FILLED ORAL at 21:56

## 2020-02-15 RX ADMIN — ARFORMOTEROL TARTRATE SCH MCG: 15 SOLUTION RESPIRATORY (INHALATION) at 20:20

## 2020-02-15 RX ADMIN — HUMAN INSULIN SCH UNIT: 100 INJECTION, SOLUTION SUBCUTANEOUS at 16:34

## 2020-02-15 RX ADMIN — HUMAN INSULIN SCH: 100 INJECTION, SOLUTION SUBCUTANEOUS at 21:00

## 2020-02-15 NOTE — P.PN
Subjective


Date of Service: 02/15/20


Primary Care Provider: [facility]


Chief Complaint: Cough, body aches


Subjective: Improving (Patient improved.  Less cough noted. Better inspiration 

and expiration.)





Physical Examination





- Vital Signs


Temperature: 97.8 F


Blood Pressure: 112/51


Pulse: 80


Respirations: 19


Pulse Ox (%): 94





- Physical Exam


General: Alert, In no apparent distress, Oriented x3, Cooperative


HEENT: Atraumatic


Neck: Supple


Respiratory: Other (Less congestion noted. Less wheezing noted. Improved since 

yesterday.)


Cardiovascular: Normal pulses, Regular rate/rhythm


Gastrointestinal: Normal bowel sounds, Soft and benign, Non-distended, No 

tenderness, No masses, No rebound, No guarding


Neurological: Normal speech, Normal strength at 5/5 x4 extr, Normal tone, 

Normal affect





- Studies


Microbiology Data (last 24 hrs): 








02/13/20 00:47   Blood  - Blood   Anaerobic Blood Culture - Final


02/13/20 00:41   Blood  - Blood   Anaerobic Blood Culture - Final





Medications List Reviewed: Yes





Assessment & Plan


Discharge Plan: Home


Plan to discharge in: 24 Hours


Physician Review Additional Text: 








Impression:


Arthralgias, cough and shortness of breath secondary to Influenza A now with 

likely superimposed right lower lobe pneumonia suspect aspiration


Nausea and vomiting likely related to viral syndrome


Acute renal injury


COPD


Diabetes mellitus type 2 insulin-dependent with hyperglycemia


Hypothyroidism


GERD


History of bilateral lower extremity amputations





Plan:


Arthralgias, cough and shortness of breath secondary to Influenza A now with 

likely superimposed right lower lobe pneumonia suspect aspiration:  Patient 

continues to improve.  Continue IV antibiotic therapy.  IV fluids discontinued 

yesterday.  Some congestion noted. Will give 1 dose of Lasix IV today.  Will 

continue to reassess.  Wean off oxygen.  Once off oxygen and will plan for 

discharge. Will reassess later today.  Possible discharge later today if not 

tomorrow with clinical improvement.  


Nausea and vomiting likely related to viral syndrome:  Will provide antiemetic 

therapy.  Continue with GERD medication.  Patient without nausea this morning.  

Diet has been advanced


Acute renal injury:  IV fluids discontinued yesterday.  Continue oral intake.


COPD:  Continue with COPD medication.


Diabetes mellitus type 2 insulin-dependent with hyperglycemia:  Will monitor 

Accu-Cheks.  Will provide sliding scale as needed.


Hypothyroidism:  Continue with home medication.


GERD:  Continue Protonix.


History of bilateral lower extremity amputations:  Continue with aspirin.  

Patient on DVT prophylaxis-Lovenox.


Time Spent Managing Pts Care (In Minutes): 55

## 2020-02-16 LAB
BUN BLD-MCNC: 9 MG/DL (ref 7–18)
GLUCOSE SERPLBLD-MCNC: 166 MG/DL (ref 74–106)
HCT VFR BLD CALC: 39.3 % (ref 39.6–49)
LYMPHOCYTES # SPEC AUTO: 2 K/UL (ref 0.7–4.9)
PMV BLD: 10 FL (ref 7.6–11.3)
POTASSIUM SERPL-SCNC: 3.6 MMOL/L (ref 3.5–5.1)
RBC # BLD: 4.49 M/UL (ref 4.33–5.43)

## 2020-02-16 RX ADMIN — INSULIN GLARGINE SCH UNITS: 100 INJECTION, SOLUTION SUBCUTANEOUS at 09:19

## 2020-02-16 RX ADMIN — HUMAN INSULIN SCH UNIT: 100 INJECTION, SOLUTION SUBCUTANEOUS at 20:17

## 2020-02-16 RX ADMIN — AMOXICILLIN AND CLAVULANATE POTASSIUM SCH MG: 500; 125 TABLET, FILM COATED ORAL at 20:13

## 2020-02-16 RX ADMIN — FOLIC ACID SCH MG: 1 TABLET ORAL at 09:18

## 2020-02-16 RX ADMIN — Medication SCH ML: at 20:16

## 2020-02-16 RX ADMIN — PIPERACILLIN SODIUM AND TAZOBACTAM SODIUM SCH: 3; .375 INJECTION, POWDER, LYOPHILIZED, FOR SOLUTION INTRAVENOUS at 09:00

## 2020-02-16 RX ADMIN — ARFORMOTEROL TARTRATE SCH MCG: 15 SOLUTION RESPIRATORY (INHALATION) at 20:00

## 2020-02-16 RX ADMIN — Medication SCH ML: at 09:20

## 2020-02-16 RX ADMIN — ARFORMOTEROL TARTRATE SCH MCG: 15 SOLUTION RESPIRATORY (INHALATION) at 08:25

## 2020-02-16 RX ADMIN — PANTOPRAZOLE SODIUM SCH MG: 40 TABLET, DELAYED RELEASE ORAL at 09:17

## 2020-02-16 RX ADMIN — GUAIFENESIN SCH MG: 600 TABLET, EXTENDED RELEASE ORAL at 09:19

## 2020-02-16 RX ADMIN — GUAIFENESIN SCH MG: 600 TABLET, EXTENDED RELEASE ORAL at 20:16

## 2020-02-16 RX ADMIN — LEVOTHYROXINE SODIUM SCH MG: 88 TABLET ORAL at 05:45

## 2020-02-16 RX ADMIN — HUMAN INSULIN SCH: 100 INJECTION, SOLUTION SUBCUTANEOUS at 07:30

## 2020-02-16 RX ADMIN — OSELTAMIVIR PHOSPHATE SCH MG: 75 CAPSULE ORAL at 09:17

## 2020-02-16 RX ADMIN — Medication SCH GTT: at 20:15

## 2020-02-16 RX ADMIN — PIPERACILLIN SODIUM AND TAZOBACTAM SODIUM SCH MLS: 3; .375 INJECTION, POWDER, LYOPHILIZED, FOR SOLUTION INTRAVENOUS at 00:01

## 2020-02-16 RX ADMIN — HUMAN INSULIN SCH UNIT: 100 INJECTION, SOLUTION SUBCUTANEOUS at 11:53

## 2020-02-16 RX ADMIN — OSELTAMIVIR PHOSPHATE SCH MG: 75 CAPSULE ORAL at 20:13

## 2020-02-16 RX ADMIN — DOCUSATE SODIUM SCH MG: 100 CAPSULE, LIQUID FILLED ORAL at 09:18

## 2020-02-16 RX ADMIN — ATORVASTATIN CALCIUM SCH MG: 20 TABLET, FILM COATED ORAL at 20:14

## 2020-02-16 RX ADMIN — HUMAN INSULIN SCH: 100 INJECTION, SOLUTION SUBCUTANEOUS at 16:30

## 2020-02-16 RX ADMIN — Medication SCH GTT: at 09:00

## 2020-02-16 RX ADMIN — ENOXAPARIN SODIUM SCH MG: 40 INJECTION SUBCUTANEOUS at 09:19

## 2020-02-16 RX ADMIN — Medication PRN MG: at 20:19

## 2020-02-16 RX ADMIN — ASPIRIN SCH MG: 81 TABLET, COATED ORAL at 09:19

## 2020-02-16 RX ADMIN — DOCUSATE SODIUM SCH MG: 100 CAPSULE, LIQUID FILLED ORAL at 20:14

## 2020-02-16 RX ADMIN — Medication SCH MG: at 09:19

## 2020-02-16 NOTE — P.PN
Subjective


Date of Service: 02/16/20


Primary Care Provider: [facility]


Chief Complaint: Cough, body aches


Subjective: Improving (Still with some cough.)





Physical Examination





- Vital Signs


Temperature: 97.4 F


Blood Pressure: 142/65


Pulse: 73


Respirations: 18


Pulse Ox (%): 96





- Physical Exam


General: Alert, In no apparent distress, Oriented x3


HEENT: Atraumatic


Neck: Supple


Respiratory: Clear to auscultation bilaterally, Normal air movement


Cardiovascular: Normal pulses, Regular rate/rhythm


Gastrointestinal: Normal bowel sounds, Soft and benign, Non-distended, No masses

, No rebound, No guarding


Musculoskeletal: No tenderness, No warmth





- Studies


Medications List Reviewed: Yes





Assessment & Plan


Discharge Plan: Home


Plan to discharge in: 24 Hours


Physician Review Additional Text: 








Impression:


Arthralgias, cough and shortness of breath secondary to Influenza A now with 

likely superimposed right lower lobe pneumonia suspect aspiration


Nausea and vomiting likely related to viral syndrome


Acute renal injury


COPD


Diabetes mellitus type 2 insulin-dependent with hyperglycemia


Hypothyroidism


GERD


History of bilateral lower extremity amputations





Plan:


Arthralgias, cough and shortness of breath secondary to Influenza A now with 

likely superimposed right lower lobe pneumonia suspect aspiration:  Patient 

continues to improve.  Will recheck chest x-ray today along with pro calcitonin 

and CBC.  If able to wean off oxygen today then will consider discharge today.  

If not will need to arrange for home oxygen, this will likely need to be 

arranged tomorrow with social service.  Will continue to assess. 


Nausea and vomiting likely related to viral syndrome:  No significant nausea or 

vomiting noted. Continue medication for GERD.


Acute renal injury:  Resolved.  Continue oral intake.


COPD:  Continue with COPD medication.  Patient may require home oxygen at 

discharge. Will try to wean off oxygen today.  If so patient can be discharge. 

If not home oxygen will have to be arranged tomorrow with the help of social 

services.


Diabetes mellitus type 2 insulin-dependent with hyperglycemia:  Will monitor 

Accu-Cheks.  Will provide sliding scale as needed.


Hypothyroidism:  Continue with home medication.


GERD:  Continue Protonix.


History of bilateral lower extremity amputations:  Continue with aspirin.  

Patient on DVT prophylaxis-Lovenox.


Time Spent Managing Pts Care (In Minutes): 55

## 2020-02-16 NOTE — RAD REPORT
EXAM DESCRIPTION:  RAD - Chest Single View - 2/16/2020 8:19 am

 

CLINICAL HISTORY:  follow up pneumonia

 

COMPARISON:  Chest Single View dated 2/14/2020; Chest Pa And Lat (2 Views) dated 2/13/2020

 

TECHNIQUE:  AP portable chest image was obtained 2/16/2020 8:19 am .

 

FINDINGS:  No focal mass or consolidation. Bilateral lung base interstitial opacification has substan
tially improved. Minimal remnant noted. Heart and vasculature are normal. No measurable pleural effus
ion and no pneumothorax. No acute bony abnormality seen. No acute aortic findings suspected.

 

IMPRESSION:  Significant improvement in the bilateral lung base opacification since February 14.

## 2020-02-17 VITALS — DIASTOLIC BLOOD PRESSURE: 42 MMHG | TEMPERATURE: 98 F | SYSTOLIC BLOOD PRESSURE: 92 MMHG

## 2020-02-17 VITALS — OXYGEN SATURATION: 93 %

## 2020-02-17 LAB
BUN BLD-MCNC: 9 MG/DL (ref 7–18)
GLUCOSE SERPLBLD-MCNC: 41 MG/DL (ref 74–106)
MAGNESIUM SERPL-MCNC: 1.9 MG/DL (ref 1.8–2.4)
POTASSIUM SERPL-SCNC: 3.6 MMOL/L (ref 3.5–5.1)

## 2020-02-17 RX ADMIN — DOCUSATE SODIUM SCH MG: 100 CAPSULE, LIQUID FILLED ORAL at 08:27

## 2020-02-17 RX ADMIN — INSULIN GLARGINE SCH UNITS: 100 INJECTION, SOLUTION SUBCUTANEOUS at 08:29

## 2020-02-17 RX ADMIN — GUAIFENESIN SCH MG: 600 TABLET, EXTENDED RELEASE ORAL at 08:27

## 2020-02-17 RX ADMIN — HUMAN INSULIN SCH UNIT: 100 INJECTION, SOLUTION SUBCUTANEOUS at 08:28

## 2020-02-17 RX ADMIN — AMOXICILLIN AND CLAVULANATE POTASSIUM SCH MG: 500; 125 TABLET, FILM COATED ORAL at 08:27

## 2020-02-17 RX ADMIN — HUMAN INSULIN SCH UNIT: 100 INJECTION, SOLUTION SUBCUTANEOUS at 12:18

## 2020-02-17 RX ADMIN — Medication SCH ML: at 08:29

## 2020-02-17 RX ADMIN — ASPIRIN SCH MG: 81 TABLET, COATED ORAL at 08:26

## 2020-02-17 RX ADMIN — Medication SCH GTT: at 08:28

## 2020-02-17 RX ADMIN — PANTOPRAZOLE SODIUM SCH MG: 40 TABLET, DELAYED RELEASE ORAL at 08:27

## 2020-02-17 RX ADMIN — ARFORMOTEROL TARTRATE SCH MCG: 15 SOLUTION RESPIRATORY (INHALATION) at 07:45

## 2020-02-17 RX ADMIN — ENOXAPARIN SODIUM SCH MG: 40 INJECTION SUBCUTANEOUS at 08:28

## 2020-02-17 RX ADMIN — LEVOTHYROXINE SODIUM SCH MG: 88 TABLET ORAL at 06:13

## 2020-02-17 RX ADMIN — Medication SCH MG: at 08:26

## 2020-02-17 RX ADMIN — FOLIC ACID SCH MG: 1 TABLET ORAL at 08:27

## 2020-02-17 NOTE — P.DS
Admission Date: 02/13/20


Discharge Date: 02/17/20


Primary Care Provider: [facility]


Disposition: DC HOME/HOME HEALTH CARE


Discharge Condition: GOOD


Reason for Admission: Cough, body aches


Consultations: 





None





Procedures: 





Follow up CXR: 


FINDINGS:  No focal mass or consolidation. Bilateral lung base interstitial 

opacification has substantially improved. Minimal remnant noted. Heart and 

vasculature are normal. No measurable pleural effusion and no pneumothorax. No 

acute bony abnormality seen. No acute aortic findings suspected. 


IMPRESSION:  Significant improvement in the bilateral lung base opacification 

since February 14.





Medical Problem List: 


Arthralgias, cough and shortness of breath secondary to Influenza A now with 

likely superimposed right lower lobe pneumonia suspect aspiration


Nausea and vomiting likely related to viral syndrome


Acute renal injury


COPD


Diabetes mellitus type 2 insulin-dependent with hyperglycemia


Hypothyroidism


GERD


History of bilateral lower extremity amputations





Brief History of Present Illness: 





64-year-old  male with history of COPD, diabetes, and below-knee 

amputations.





Patient presented with cough, congestion, and runny nose.  Symptoms have 

started over the past week.  Symptoms have been getting worse.  Multiple 

members of the family have been sick.  He start to have some body aches today 

with mild fever.  Some shortness of breath also noted. He came to the ER for 

further evaluation.





In the ER patient was slightly tachypneic.  Room-air saturations within normal 

range.  Chest x-ray showed no evidence of pneumonia.  White count 9.6, 

hemoglobin 14. Platelet count of 195. Lactic acid within normal range.  

Urinalysis unremarkable.  Blood sugar 258. Patient was positive for influenza 

A.  Patient was admitted for further evaluation and observation.





When I saw the patient ER, he reported some nausea and vomiting.  Body aches, 

weakness and fatigue also noted.


Hospital Course: 





Patient presented with arthralgias, cough and shortness of breath.  Patient was 

found to have influenza a.  Patient was admitted for observation and treatment.

  During the course of his stay his condition did not improve quickly.  Patient 

was found to have superimposed right lower lobe pneumonia likely from 

aspiration as he had some nausea and vomiting early in his evaluation.  The 

patient was treated with IV antibiotic therapy, oxygen.  Patient improved.  

Patient has received his full course of Tamiflu.  At discharge patient requires 

home oxygen.  Patient with underlying COPD.  At discharge she will continue 

with Augmentin 500 mg twice daily, Mucinex 600 mg twice daily as needed for 

congestion and Tessalon Perles 100 mg 3 times a day as needed for cough.  

Recommend to recheck chest x-ray in 2-4 weeks to monitor resolution.  Prior to 

discharge patient will continue with home health and physical therapy at 

discharge. Prior to discharge home oxygen will be arranged.





Patient with underlying COPD.  As mentioned above patient will require home 

oxygen at discharge. He is to maintain room-air saturations above 93%.  This 

can be weaned off.  Recommend to continue with Symbicort 2 puffs twice daily 

and Pro air 2 puffs 3 times a day as needed for shortness of breath.  Recommend 

follow up with pulmonology in 1-2 weeks to follow up this hospitalization and 

continue his care.





Patient had acute renal injury due to nausea and vomiting.  This has resolved.





Patient with underlying diabetes mellitus type 2 insulin-dependent with 

hyperglycemia.  Patient continued with insulin therapy.  This has remained 

stable.  At discharge she will continue with his current regimen of Lantus 5 

units subcu at bedtime.  Recommend to maintain blood sugars less 140 fasting 

and less than 200 after meals.  Further adjustment can be done by his PCP.





Patient with hypothyroidism.  This has remained stable.  At discharge she will 

continue with levothyroxine 88 mcg daily.





Patient with underlying GERD.  Will recommend to start Protonix 40 mg daily.  

Patient may benefit with GI evaluation as an outpatient.





Patient with chronic constipation.  Will recommend to continue with docusate 

100 mg twice daily as a stool softener.  Patient may also use lactulose twice 

daily as needed for constipation.





Patient with history of bilateral lower extremity amputations.  Fall 

precautions in place.  Patient will continue with home health and physical 

therapy at discharge.


Vital Signs/Physical Exam: 














Temp Pulse Resp BP Pulse Ox


 


 97.6 F   80   18   121/57 L  95 


 


 02/17/20 04:00  02/17/20 04:00  02/17/20 04:00  02/17/20 04:00  02/17/20 04:00








General: Alert, In no apparent distress, Oriented x3, Cooperative


HEENT: Atraumatic


Neck: Supple


Respiratory: Clear to auscultation bilaterally, Normal air movement


Cardiovascular: Normal pulses, Regular rate/rhythm


Gastrointestinal: Normal bowel sounds, Soft and benign, Non-distended


Integumentary: Other (Patient with bilateral lower extremity amputations)


Neurological: Normal speech, Normal strength at 5/5 x4 extr, Normal tone, 

Normal affect


Laboratory Data at Discharge: 














WBC  7.0 K/uL (4.3-10.9)   02/16/20  07:56    


 


Hgb  12.8 g/dL (13.6-17.9)  L  02/16/20  07:56    


 


Hct  39.3 % (39.6-49.0)  L  02/16/20  07:56    


 


Plt Count  180 K/uL (152-406)   02/16/20  07:56    


 


PT  12.2 SECONDS (9.5-12.5)   02/11/20  12:58    


 


INR  1.04   02/11/20  12:58    


 


APTT  26.7 SECONDS (24.3-36.9)   02/11/20  12:58    


 


Sodium  143 mmol/L (136-145)   02/17/20  05:01    


 


Potassium  3.6 mmol/L (3.5-5.1)   02/17/20  05:01    


 


BUN  9 mg/dL (7-18)   02/17/20  05:01    


 


Creatinine  0.77 mg/dL (0.55-1.3)   02/17/20  05:01    


 


Glucose  41 mg/dL ()  L*  02/17/20  05:01    


 


Magnesium  1.9 mg/dL (1.8-2.4)   02/17/20  05:01    


 


Total Bilirubin  0.2 mg/dL (0.2-1.0)   02/13/20  00:41    


 


AST  142 U/L (15-37)  H D 02/13/20  00:41    


 


ALT  27 U/L (12-78)   02/13/20  00:41    


 


Alkaline Phosphatase  59 U/L ()   02/13/20  00:41    


 


Amylase  10 U/L ()  L  02/11/20  12:58    


 


Lipase  24 U/L ()  L  02/11/20  12:58    








Home Medications: 








Brinzolamide/Brimonidine Tart [Simbrinza 1%-0.2% Eye Drops] 1 gtt OPTH BID 02/11 /20 


Cyclopentolate 1% [Cyclogyl 1%*] 1 gtt OPTH DAILY PRN 02/11/20 


Levothyroxine [Synthroid*] 1 tab PO [id number] 02/11/20 


Simvastatin 40 mg PO BEDTIME 02/11/20 


Albuterol Inhaler [Ventolin Inhaler*] 2 puff IH TID PRN #1 hfa.aer.ad 02/17/20 


Amox/Clavulanate [Augmentin 500-125 mg Tab*] 500 mg PO BID #14 tab 02/17/20 


Aspirin [Aspirin EC 81 MG] 81 mg PO DAILY #90 tablet. 02/17/20 


Benzonatate [Tessalon Perle*] 100 mg PO TID PRN #15 cap 02/17/20 


Budesonide/Formoterol Fumarate [Symbicort 160-4.5 Mcg Inhaler] 2 puff IH BID #1 

hfa.aer.ad 02/17/20 


Docusate [Colace Cap*] 100 mg PO BID #60 cap 02/17/20 


Folic Acid 1 mg PO DAILY #90 tablet 02/17/20 


Guaifenesin [Mucinex] 600 mg PO BID PRN #15 tab.er.12h 02/17/20 


Insulin Glargine Human [Lantus*] 5 units SQ DAILY WITH BREAKFAST #1 bottle 02/17 /20 


Lactulose 15 ml PO BID PRN #1 bottle 02/17/20 


Pantoprazole [Protonix Tab*] 40 mg PO ACB #30 tab 02/17/20 





New Medications: 


Albuterol Inhaler [Ventolin Inhaler*] 2 puff IH TID PRN #1 hfa.aer.ad


 PRN Reason: Shortness Of Breath


Amox/Clavulanate [Augmentin 500-125 mg Tab*] 500 mg PO BID #14 tab


Aspirin [Aspirin EC 81 MG] 81 mg PO DAILY #90 tablet.


Benzonatate [Tessalon Perle*] 100 mg PO TID PRN #15 cap


 PRN Reason: Cough


Budesonide/Formoterol Fumarate [Symbicort 160-4.5 Mcg Inhaler] 2 puff IH BID #1 

hfa.aer.ad


Docusate [Colace Cap*] 100 mg PO BID #60 cap


Folic Acid 1 mg PO DAILY #90 tablet


Guaifenesin [Mucinex] 600 mg PO BID PRN #15 tab.er.12h


 PRN Reason: Cough


Insulin Glargine Human [Lantus*] 5 units SQ DAILY WITH BREAKFAST #1 bottle


Lactulose 15 ml PO BID PRN #1 bottle


 PRN Reason: Constipation


Pantoprazole [Protonix Tab*] 40 mg PO ACB #30 tab


Patient Discharge Instructions: 1.  Recommend follow up with his PCP in 1-2 

weeks to follow up this hospitalization.  2.  Patient presented with arthralgias

, cough and shortness of breath.  Patient was found to have influenza a.  

Patient was admitted for observation and treatment.  During the course of his 

stay his condition did not improve quickly.  Patient was found to have 

superimposed right lower lobe pneumonia likely from aspiration as he had some 

nausea and vomiting early in his evaluation.  The patient was treated with IV 

antibiotic therapy, oxygen.  Patient improved.  Patient has received his full 

course of Tamiflu.  At discharge patient requires home oxygen.  Patient with 

underlying COPD.  At discharge she will continue with Augmentin 500 mg twice 

daily, Mucinex 600 mg twice daily as needed for congestion and Tessalon Perles 

100 mg 3 times a day as needed for cough.  Recommend to recheck chest x-ray in 2

-4 weeks to monitor resolution.  Prior to discharge patient will continue with 

home health and physical therapy at discharge. Prior to discharge home oxygen 

will be arranged.  3.  Patient with underlying COPD.  As mentioned above 

patient will require home oxygen at discharge. He is to maintain room-air 

saturations above 93%.  This can be weaned off.  Recommend to continue with 

Symbicort 2 puffs twice daily and Pro air 2 puffs 3 times a day as needed for 

shortness of breath.  Recommend follow up with pulmonology in 1-2 weeks to 

follow up this hospitalization and continue his care.  4.  Patient had acute 

renal injury due to nausea and vomiting.  This has resolved.  5.  Patient with 

underlying diabetes mellitus type 2 insulin-dependent with hyperglycemia.  

Patient continued with insulin therapy.  This has remained stable.  At 

discharge she will continue with his current regimen of Lantus 5 units subcu at 

bedtime.  Recommend to maintain blood sugars less 140 fasting and less than 200 

after meals.  Further adjustment can be done by his PCP.  6.  Patient with 

hypothyroidism.  This has remained stable.  At discharge she will continue with 

levothyroxine 88 mcg daily.  7.  Patient with underlying GERD.  Will recommend 

to start Protonix 40 mg daily.  Patient may benefit with GI evaluation as an 

outpatient.  8.  Patient with chronic constipation.  Will recommend to continue 

with docusate 100 mg twice daily as a stool softener.  Patient may also use 

lactulose twice daily as needed for constipation.  9.  Patient with history of 

bilateral lower extremity amputations.  Fall precautions in place.  Patient 

will continue with home health and physical therapy at discharge.


Diet: ADA


Activity: Ad shelley


Time spent managing pt's care (in minutes): 55

## 2020-12-02 ENCOUNTER — HOSPITAL ENCOUNTER (EMERGENCY)
Dept: HOSPITAL 97 - ER | Age: 65
LOS: 1 days | Discharge: ANOTHER HOSPITAL | End: 2020-12-03
Payer: COMMERCIAL

## 2020-12-02 DIAGNOSIS — E11.10: Primary | ICD-10-CM

## 2020-12-02 DIAGNOSIS — Z20.828: ICD-10-CM

## 2020-12-02 DIAGNOSIS — N17.9: ICD-10-CM

## 2020-12-02 DIAGNOSIS — I21.4: ICD-10-CM

## 2020-12-02 DIAGNOSIS — I10: ICD-10-CM

## 2020-12-02 DIAGNOSIS — Z79.4: ICD-10-CM

## 2020-12-02 LAB
ALBUMIN SERPL BCP-MCNC: 2.8 G/DL (ref 3.4–5)
ALP SERPL-CCNC: 131 U/L (ref 45–117)
ALT SERPL W P-5'-P-CCNC: 27 U/L (ref 12–78)
AST SERPL W P-5'-P-CCNC: 64 U/L (ref 15–37)
BUN BLD-MCNC: 85 MG/DL (ref 7–18)
COHGB MFR BLDA: 1.3 % (ref 0–1.5)
GLUCOSE SERPLBLD-MCNC: 655 MG/DL (ref 74–106)
HCT VFR BLD CALC: 42.5 % (ref 39.6–49)
INR BLD: 0.91
LYMPHOCYTES # SPEC AUTO: 0.9 K/UL (ref 0.7–4.9)
MAGNESIUM SERPL-MCNC: 3.1 MG/DL (ref 1.8–2.4)
NT-PROBNP SERPL-MCNC: (no result) PG/ML (ref ?–125)
OXYHGB MFR BLDA: 71.6 % (ref 94–97)
PMV BLD: 10.2 FL (ref 7.6–11.3)
POTASSIUM SERPL-SCNC: 5.6 MMOL/L (ref 3.5–5.1)
RBC # BLD: 4.37 M/UL (ref 4.33–5.43)
SAO2 % BLDA: 73.5 % (ref 92–98.5)
TROPONIN (EMERG DEPT USE ONLY): 11.7 NG/ML (ref 0–0.04)

## 2020-12-02 PROCEDURE — 71045 X-RAY EXAM CHEST 1 VIEW: CPT

## 2020-12-02 PROCEDURE — 80048 BASIC METABOLIC PNL TOTAL CA: CPT

## 2020-12-02 PROCEDURE — 85025 COMPLETE CBC W/AUTO DIFF WBC: CPT

## 2020-12-02 PROCEDURE — 99285 EMERGENCY DEPT VISIT HI MDM: CPT

## 2020-12-02 PROCEDURE — 82805 BLOOD GASES W/O2 SATURATION: CPT

## 2020-12-02 PROCEDURE — 81003 URINALYSIS AUTO W/O SCOPE: CPT

## 2020-12-02 PROCEDURE — 36415 COLL VENOUS BLD VENIPUNCTURE: CPT

## 2020-12-02 PROCEDURE — 80076 HEPATIC FUNCTION PANEL: CPT

## 2020-12-02 PROCEDURE — 93005 ELECTROCARDIOGRAM TRACING: CPT

## 2020-12-02 PROCEDURE — 51702 INSERT TEMP BLADDER CATH: CPT

## 2020-12-02 PROCEDURE — 83735 ASSAY OF MAGNESIUM: CPT

## 2020-12-02 PROCEDURE — 82010 KETONE BODYS QUAN: CPT

## 2020-12-02 PROCEDURE — 82947 ASSAY GLUCOSE BLOOD QUANT: CPT

## 2020-12-02 PROCEDURE — 84484 ASSAY OF TROPONIN QUANT: CPT

## 2020-12-02 PROCEDURE — 83880 ASSAY OF NATRIURETIC PEPTIDE: CPT

## 2020-12-02 PROCEDURE — 36556 INSERT NON-TUNNEL CV CATH: CPT

## 2020-12-02 PROCEDURE — 85610 PROTHROMBIN TIME: CPT

## 2020-12-02 NOTE — XMS REPORT
Continuity of Care Document

                           Created on:2020



Patient:HEIDI MCCLAIN

Sex:Male

:1955

External Reference #:[id number]





Demographics







                          Address                   [address]



                                                    [place] [address]

 

                          Home Phone                [phone]

 

                          Mobile Phone              [phone]

 

                          Email Address             NONE

 

                          Preferred Language        English

 

                          Marital Status            Unknown

 

                          Caodaism Affiliation     Unknown

 

                          Race                      Unknown

 

                          Additional Race(s)        Unavailable



                                                    White

 

                          Ethnic Group              Unknown









Author







                          Organization              [facility] [place] [place]

t

 

                          Address                   [address]



                                                    [place] [address]

 

                          Phone                     [phone]









Support







                Name            Relationship    Address         Phone

 

                Rashawn          Daughter-in-Law Unavailable     [phone]

 

                Rashawn          [place]      Unavailable     [phone]









Care Team Providers







                    Name                Role                Phone

 

                    Sharpmaisha           Primary Care Physician [phone]

 

                    AMBROSE CORDON        Attending Clinician Unavailable

 

                    AMBROSE CORDON        Admitting Clinician Unavailable









Advance Directives







           Directive  Decision   Effective Date Termination Date Comments   Sour

ce

 

           Partial Code This Yes        2017            [facility]



           code status was            00:00:00   00:00:00              Medical C

enter



           determined by:                                             



           Patient Drug                                             



           Protocol After                                             



           Arrest Occurs? Yes                                             



           Mechanical                                             



           Ventilation with                                             



           Intubation? No                                             



           Bag/Mask? No                                             



           Internal/External                                             



           Pacemaker? No                                             



           Transfer to                                             



           Critical Care? No                                             



           Chest Compressions?                                             



           No                                                     



           Defibrillation/Card                                             



           ioversion? No                                             







Problems







       Condition Condition Condition Status Onset  Resolution Last   Treating Co

mments 

Source



       Name   Details Category        Date   Date   Treatment Clinician        



                                                 Date                 

 

       Carotid Carotid Disease Active                              [facility]



       stenosis stenosis               6-16                               [facility] 

[facility]



                                   00:00:                             Medical



                                   00                                 Center

 

       History of History of Disease Active                              [facility]

[facility]



       coronary coronary               6-16                               [facility] 

-



       artery artery               00:00:                             Medical



       disease disease               00                                 Center

 

       PVD    PVD    Disease Active                              [facility]



       (periphera (periphera               6-16                               [facility]

[facility] -



       l vascular l vascular               00:00:                             Me

dical



       disease) disease)               00                                 Center

 

       COPD   COPD   Disease Active                              [facility]



       (chronic (chronic               6-16                               [facility] 

-



       obstructiv obstructiv               00:00:                             Me

dical



       e      e                    00                                 Center



       pulmonary pulmonary                                                  



       disease) disease)                                                  

 

       DM     DM     Disease Active                              [facility]



       (diabetes (diabetes               6-16                               [facility]

s -



       mellitus) mellitus)               00:00:                             Medi

[facility]



                                   00                                 Center

 

       Hypothyroi Hypothyroi Disease Active                              [facility]

[facility]



       dism   dism                 6-16                               [facility]



                                   00:00:                             Medical



                                   00                                 Center

 

       History of History of Disease Active                              [facility]

[facility]



       below knee below knee               6-16                               [facility]

[facility] -



       amputation amputation               00:00:                             Me

dical



       , right: , right:               00                                 Center



       in  in                                                   

 

       History of History of Disease Active                              C

[facility]



       above knee above knee               6-16                               [facility]

[facility] -



       amputation amputation               00:00:                             Me

dical



       , left: in , left: in               00                                 Ce

nter



          2006                                                    

 

       Dizziness Dizziness Disease Active                              [facility]



                                   6-15                               [facility]



                                   00:00:                             Medical



                                   00                                 Center

 

       TIA    TIA    Disease Active                              [facility]



       (transient (transient               6-14                               [facility]

s -



       ischemic ischemic               00:00:                             Medica

l



       attack) attack)               00                                 Center







Allergies, Adverse Reactions, Alerts

This patient has no known allergies or adverse reactions.



Family History







           Family Member Diagnosis  Comments   Start Date Stop Date  Source

 

           Natural father Heart disease                                  [facility]







Social History







           Social Habit Start Date Stop Date  Quantity   Comments   Source

 

           Sex Assigned At                                             [facility]

 

           Cigarettes smoked 2017                       [facility] -



           current (pack per 00:00:00   00:00:00                         [facility]



           day) - Reported                                             

 

           Cigarette  2017                       [facility] -



           pack-years 00:00:00   00:00:00                         [facility]

 

           Alcohol intake 2017 Current               [facility]

es -



                      00:00:00   00:00:00   non-drinker of            Medical Ce

nter



                                            alcohol               



                                            (finding)             

 

           History of tobacco            2012 Current smoker            [facility] -



           use                   00:00:00                         [facility]









                Smoking Status  Start Date      Stop Date       Source

 

                Former smoker   2017 00:00:00 2017 00:00:00 [facility]







Medications







       Ordered Filled Start  Stop   Current Ordering Indication Dosage Frequency

 Signature

                    Comments            Components          Source



     Medication Medication Date Date Medication? Clinician                (SIG) 

          



     Name Name                                                   

 

     aspirin 81            Yes            81mg QD   Take 81 mg           [facility]

[facility]



     MG EC      6-16                               by mouth           [facility]



     tablet      16:08:                               daily.           [address]

 

     gabapentin            Yes            300mg [id number] Take 300       

    [facility]



     (NEURONTIN)      6-16                          [id number] mg by           L

ukes -



     300 MG      16:08:                          3D   mouth 3           Medical



     capsule      11                                 (three)           Center



                                                  times           



                                                  daily.           

 

     levothyroxi      2017-      Yes            100ug      Take 100           [facility]

[facility]



     ne        6-16                               mcg by           [facility]



     (SYNTHROID,      16:08:                               mouth           Medic

al



     LEVOTHROID)      11                                 Every           Center



     100 MCG                                         morning on           



     tablet                                         an empty           



                                                  stomach.           

 

     simvastatin      2017      Yes            40mg QD   Take 40 mg           

[facility]



     (ZOCOR) 40      6-16                               by mouth           [facility]

 -



     MG tablet      16:08:                               nightly.           Medi

clyde



               11                                                Center

 

     ticagrelor      2017-0      Yes                 Q.5D Take by           [facility]



     (BRILINTA)      6-16                               mouth 2           [facility] 

-



     90 mg Tab      16:08:                               (two)           Medical



     tablet      11                                 times           Center



                                                  daily.           

 

     albuterol      -      Yes                      Inhale by           [facility]



     HFA       6-16                               mouth via           [facility] -



     (VENTOLIN      16:08:                               inhaler           Medic

al



     HFA) 90      11                                 every 6           Center



     mcg/actuati                                         (six)           



     on inhaler                                         hours as           



                                                  needed for           



                                                  Wheezing .           

 

     budesonide-            Yes            2{puff} Q.5D Inhale 2          

 [facility]



     formoterol      6-16                               puffs by           [facility]

 -



     (SYMBICORT)      16:08:                               mouth via           M

edical



     160-4.5      11                                 inhaler 2           Center



     mcg/actuati                                         (two)           



     on inhaler                                         times           



                                                  daily.           

 

     brinzolamid      -      Yes            1[drp] [id number] Place 1      

     [facility]



     e (AZOPT) 1      6-16                          [id number] drop into        

   [facility]



     %         16:08:                          3D   both eyes           Medical



     ophthalmic      11                                 3 (three)           Cent

er



     suspension                                         times           



                                                  daily.           

 

     latanoprost      2017      Yes            1[drp] QD   1 drop           [facility]

[facility]



     (XALATAN)      6-16                               nightly.           [facility] 

-



     0.005 %      16:08:                                              Medical



     ophthalmic      11                                                Center



     solution                                                        

 

     prednisoLON      2017-      Yes            1[drp] Q.25D 1 drop 4          

 [facility]



     E acetate      6-16                               (four)           [facility]



     (PRED      16:08:                               times           Medical



     FORTE) 1 %      11                                 daily.           Center



     ophthalmic                                                        



     suspension                                                        

 

     tiotropium      2017-0      Yes            18ug QD   Inhale 18           [facility]

[facility]



     (SPIRIVA)      6-16                               mcg by           [facility]



     18 mcg      16:08:                               mouth via           Medica

l



     inhalation      11                                 inhaler           Center



     capsule                                         daily.           

 

     traMADol      2017-      Yes            50mg      Take 50 mg           [facility]



     (ULTRAM) 50      6-16                               by mouth           [facility]

s -



     mg tablet      16:08:                               every 6           Medic

al



               11                                 (six)           [place]



                                                  hours as           



                                                  needed for           



                                                  Pain.           

 

     amoxicillin            Yes            1{tbl} Q.5D Take 1           [facility]



     -clavulanat      6-16                               tablet by           [facility]

[facility] -



     e         16:08:                               mouth 2           Medical



     (AUGMENTIN)                                       (two)           [place]



     875-125 mg                                         times           



     per tablet                                         daily.           

 

     fluticasone            Yes            1{spray Q.5D 1 spray by        

   [facility]



     (FLONASE)      6-16                     }         Nasal           [facility] -



     50        16:08:                               route 2           Medical



     mcg/actuati      11                                 (two)           Center



     on nasal                                         times           



     spray                                         daily.           

 

     montelukast            Yes            10mg QD   Take 10 mg           

[facility]



     (SINGULAIR)      6-16                               by mouth           [facility]

s -



     10 mg      16:08:                               nightly.           Medical



     tablet      11                                                [place]

 

     pantoprazol            Yes            40mg QD   Take 40 mg           

[facility]



     e         6-16                               by mouth           [facility]



     (PROTONIX)      16:08:                               daily.           Medic

al



     40 MG      [address] [place] [address]



     tablet                                                        

 

     predniSONE            Yes            5mg  QD   Take 5 mg           [facility]



     (DELTASONE)      6-16                               by mouth           [facility]

s -



     5 MG tablet      16:08:                               daily.           Medi

clyde



               [address] [place] [address]

 

     insulin            Yes            20U  QD   Inject 20           [facility]

t



     detemir      6-15                               Units           [facility]



     (LEVEMIR)      00:00:                               subcutaneo           Me

dical



     100 unit/mL      00                                 usly           Center



     injection                                         daily.           







Procedures

This patient has no known procedures.



Results







           Test Description Test Time  Test Comments Results    Result Comments 

Source









                    POCT-GLUCOSE METER  2017 11:26:00 









                      Test Item  Value      Reference Range Interpretation Comme

nts









             POC-GLUCOSE METER ([facility]) (test 268 mg/dL           H       

     TESTED AT [facility] [address] [facility]



             code = 1538)                                        [facility] [address]

0



POCT-GLUCOSE [id number] 07:49:00





             Test Item    Value        Reference Range Interpretation Comments

 

             POC-GLUCOSE METER 219 mg/dL           H            TESTED AT 

[facility] [address]



             ([facility]) (test code =                                        [facility]

[facility]



             1538)                                               [address]



BASIC METABOLIC [id number] 07:26:00





             Test Item    Value        Reference Range Interpretation Comments

 

             [facility] 134 meq/L    136-145      L            



             (test code = 381)                                        

 

             POTASSIUM (BEAKER) 3.6 meq/L    3.5-5.1                   



             (test code = 379)                                        

 

             CHLORIDE (BEAKER) 103 meq/L                        



             (test code = 382)                                        

 

             CO2 (BEAKER) (test 23 meq/L     22-29                     



             code = 355)                                         

 

             BLOOD UREA NITROGEN 21 mg/dL     7-21                      



             (BEAKER) (test code                                        



             = 354)                                              

 

             CREATININE (BEAKER) 1.23 mg/dL   0.57-1.25                 



             (test code = 358)                                        

 

             GLUCOSE RANDOM 212 mg/dL           H            



             (BEAKER) (test code                                        



             = 652)                                              

 

             CALCIUM (BEAKER) 9.0 mg/dL    8.4-10.2                  



             (test code = 697)                                        

 

             EGFR (BEAKER) (test 60 mL/min/1.73                           [facility]IM[facility]

MARTHA GFR IS



             code = 1092) sq m                                   NOT AS ACCURATE

 AS



                                                                 CREATININE



                                                                 CLEARANCE IN



                                                                 PREDICTING



                                                                 GLOMERULAR



                                                                 FILTRATION RATE

.



                                                                 ESTIMATED GFR I

S



                                                                 NOT APPLICABLE 

FOR



                                                                 DIALYSIS PATIEN

TS.



CBC W/PLT COUNT &amp; AUTO [id number] 06:02:00





             Test Item    Value        Reference Range Interpretation Comments

 

             WHITE BLOOD CELL COUNT (BEAKER) 6.5 K/ L     4.0-10.0              

    



             (test code = 775)                                        

 

             RED BLOOD CELL COUNT (BEAKER) 3.90 M/ L    4.20-5.80    L          

  



             (test code = 761)                                        

 

             HEMOGLOBIN (BEAKER) (test code = 12.0 GM/DL   13.0-16.8    L       

     



             410)                                                

 

             HEMATOCRIT (BEAKER) (test code = 36.0 %       40.0-50.0    L       

     



             411)                                                

 

             MEAN CORPUSCULAR VOLUME (BEAKER) 92.2 fL      82.0-98.0            

     



             (test code = 753)                                        

 

             MEAN CORPUSCULAR HEMOGLOBIN 30.8 pg      27.0-33.0                 



             (BEAKER) (test code = 751)                                        

 

             MEAN CORPUSCULAR HEMOGLOBIN CONC 33.4 GM/DL   32.0-36.0            

     



             (BEAKER) (test code = 752)                                        

 

             RED CELL DISTRIBUTION WIDTH 12.2 %       10.3-14.2                 



             (BEAKER) (test code = 412)                                        

 

             PLATELET COUNT (BEAKER) (test 187 K/CU MM  150-430                 

  



             code = 756)                                         

 

             MEAN PLATELET VOLUME (BEAKER) 8.0 fL       6.5-10.5                

  



             (test code = 754)                                        

 

             NUCLEATED RED BLOOD CELLS 0 /100 WBC   0-0                       



             (BEAKER) (test code = 413)                                        

 

             NEUTROPHILS RELATIVE PERCENT 58 %                                  

 



             (BEAKER) (test code = 429)                                        

 

             LYMPHOCYTES RELATIVE PERCENT 23 %                                  

 



             (BEAKER) (test code = 430)                                        

 

             MONOCYTES RELATIVE PERCENT 11 %                                   



             (BEAKER) (test code = 431)                                        

 

             EOSINOPHILS RELATIVE PERCENT 7 %                                   

 



             (BEAKER) (test code = 432)                                        

 

             BASOPHILS RELATIVE PERCENT 1 %                                    



             (BEAKER) (test code = 437)                                        

 

             NEUTROPHILS ABSOLUTE COUNT 3.78 K/ L    1.80-8.00                 



             (BEAKER) (test code = 670)                                        

 

             LYMPHOCYTES ABSOLUTE COUNT 1.52 K/ L    1.48-4.50                 



             (BEAKER) (test code = 414)                                        

 

             MONOCYTES ABSOLUTE COUNT (BEAKER) 0.73 K/ L    0.00-1.30           

      



             (test code = 415)                                        

 

             EOSINOPHILS ABSOLUTE COUNT 0.48 K/ L    0.00-0.50                 



             (BEAKER) (test code = 416)                                        

 

             BASOPHILS ABSOLUTE COUNT (BEAKER) 0.04 K/ L    0.00-0.20           

      



             (test code = 417)                                        



0.00POCT-GLUCOSE METER2017-06-15 23:23:00





             Test Item    Value        Reference Range Interpretation Comments

 

             POC-GLUCOSE METER 339 mg/dL           H            TESTED AT 

[place] [address]



             ([facility]) (test code =                                        [place] [place] [address])                                               [address]



POCT-GLUCOSE METER2017-06-15 19:55:00





             Test Item    Value        Reference Range Interpretation Comments

 

             POC-GLUCOSE METER 366 mg/dL           H            Will Repea

t Test/TESTED



             ([facility]) (test code =                                        AT [place] [address] [place]



             [facility])                                               [facility] [address]

0



POCT-GLUCOSE METER2017-06-15 18:14:00





             Test Item    Value        Reference Range Interpretation Comments

 

             POC-GLUCOSE METER 410 mg/dL           HH           TESTED AT 

[place] [address]



             ([facility]) (test code =                                        [place] [address])                                               [address]



POCT-GLUCOSE METER2017-06-15 16:43:00





             Test Item    Value        Reference Range Interpretation Comments

 

             POC-GLUCOSE METER 403 mg/dL           HH           Notified R

RADHA FAIRBANKS/TESTED



             ([facility]) (test code =                                        AT [place] [address])                                               [facility] [address]

0



CBC W/PLT COUNT &amp; AUTO DIFFERENTIAL2017-06-15 11:17:00





             Test Item    Value        Reference Range Interpretation Comments

 

             WHITE BLOOD CELL COUNT (BEAKER) 8.5 K/ L     4.0-10.0              

    



             (test code = 775)                                        

 

             RED BLOOD CELL COUNT (BEAKER) 4.34 M/ L    4.20-5.80               

  



             (test code = 761)                                        

 

             HEMOGLOBIN (BEAKER) (test code = 13.1 GM/DL   13.0-16.8            

     



             410)                                                

 

             HEMATOCRIT (BEAKER) (test code = 40.4 %       40.0-50.0            

     



             411)                                                

 

             MEAN CORPUSCULAR VOLUME (BEAKER) 93.1 fL      82.0-98.0            

     



             (test code = 753)                                        

 

             MEAN CORPUSCULAR HEMOGLOBIN 30.1 pg      27.0-33.0                 



             (BEAKER) (test code = 751)                                        

 

             MEAN CORPUSCULAR HEMOGLOBIN CONC 32.3 GM/DL   32.0-36.0            

     



             (BEAKER) (test code = 752)                                        

 

             RED CELL DISTRIBUTION WIDTH 13.4 %       10.3-14.2                 



             (BEAKER) (test code = 412)                                        

 

             PLATELET COUNT (BEAKER) (test 190 K/CU MM  150-430                 

  



             code = 756)                                         

 

             MEAN PLATELET VOLUME (BEAKER) 8.3 fL       6.5-10.5                

  



             (test code = 754)                                        

 

             NUCLEATED RED BLOOD CELLS 0 /100 WBC   0-0                       



             (BEAKER) (test code = 413)                                        

 

             NEUTROPHILS RELATIVE PERCENT 68 %                                  

 



             (BEAKER) (test code = 429)                                        

 

             LYMPHOCYTES RELATIVE PERCENT 18 %                                  

 



             (BEAKER) (test code = 430)                                        

 

             MONOCYTES RELATIVE PERCENT 7 %                                    



             (BEAKER) (test code = 431)                                        

 

             EOSINOPHILS RELATIVE PERCENT 5 %                                   

 



             (BEAKER) (test code = 432)                                        

 

             BASOPHILS RELATIVE PERCENT 1 %                                    



             (BEAKER) (test code = 437)                                        

 

             NEUTROPHILS ABSOLUTE COUNT 5.84 K/ L    1.80-8.00                 



             (BEAKER) (test code = 670)                                        

 

             LYMPHOCYTES ABSOLUTE COUNT 1.57 K/ L    1.48-4.50                 



             (BEAKER) (test code = 414)                                        

 

             MONOCYTES ABSOLUTE COUNT (BEAKER) 0.62 K/ L    0.00-1.30           

      



             (test code = 415)                                        

 

             EOSINOPHILS ABSOLUTE COUNT 0.44 K/ L    0.00-0.50                 



             (BEAKER) (test code = 416)                                        

 

             BASOPHILS ABSOLUTE COUNT (BEAKER) 0.06 K/ L    0.00-0.20           

      



             (test code = 417)                                        



0.00POCT-GLUCOSE METER2017-06-15 08:34:00





             Test Item    Value        Reference Range Interpretation Comments

 

             POC-GLUCOSE METER 293 mg/dL           H            TESTED AT 

[facility] [address]



             (BEAKER) (test code =                                        [place]



             [address])                                               [address]



CBC W/PLT COUNT &amp; AUTO DIFFERENTIAL2017-06-15 07:38:00





             Test Item    Value        Reference Range Interpretation Comments

 

             WHITE BLOOD CELL COUNT  K/ L        4.0-10.0                  clotT

his is a



             (BEAKER) (test code =                                        [facility] result.



             775)                                                Previous result

 was



                                                                 0.0 K/ L on



                                                                 6/15/2017 at [facility]



                                                                 CDT

 

             RED BLOOD CELL COUNT  M/ L        4.20-5.80                 clotThi

s is a



             (BEAKER) (test code =                                        [facility] result.



             761)                                                Previous result

 was



                                                                 2.30 M/ L on



                                                                 6/15/2017 at [facility]



                                                                 CDT

 

             HEMOGLOBIN (BEAKER)  GM/DL       13.0-16.8                 clotThis

 is a



             (test code = 410)                                        corrected 

result.



                                                                 Previous result

 was



                                                                 9.8 GM/DL on



                                                                 6/15/2017 at [facility]



                                                                 CDT

 

             HEMATOCRIT (BEAKER)  %           40.0-50.0                 clotThis

 is a



             (test code = 411)                                        corrected 

result.



                                                                 Previous result

 was



                                                                 21.4 % on 6/15/

2017



                                                                 at [facility] CDT

 

             MEAN CORPUSCULAR VOLUME  fL          82.0-98.0                 clot

This is a



             (BEAKER) (test code =                                        [facility] result.



             753)                                                Previous result

 was



                                                                 92.8 fL on 6/15

/2017



                                                                 at [facility] CDT

 

             MEAN CORPUSCULAR  pg          27.0-33.0                 clotThis is

 a



             HEMOGLOBIN (BEAKER)                                        correcte

d result.



             (test code = 751)                                        Previous r

esult was



                                                                 42.5 pg on 6/15

/2017



                                                                 at [facility] CDT

 

             MEAN CORPUSCULAR  GM/DL       32.0-36.0                 clotThis is

 a



             HEMOGLOBIN CONC (BEAKER)                                        cor

rected result.



             (test code = 752)                                        Previous r

esult was



                                                                 45.8 GM/DL on



                                                                 6/15/2017 at [facility]



                                                                 CDT

 

             RED CELL DISTRIBUTION  %           10.3-14.2                 clotTh

is is a



             WIDTH (BEAKER) (test                                        correct

ed result.



             code = 412)                                         Previous result

 was



                                                                 12.1 % on 6/15/

2017



                                                                 at [facility] CDT

 

             PLATELET COUNT (BEAKER)  K/CU MM     150-430                   clot

This is a



             (test code = 756)                                        corrected 

result.



                                                                 Previous result

 was



                                                                 168 K/CU MM on



                                                                 6/15/2017 at 07

33



                                                                 CDT

 

             MEAN PLATELET VOLUME  fL          6.5-10.5                  clotThi

s is a



             (BEAKER) (test code =                                        [facility]

martha result.



             754)                                                Previous result

 was



                                                                 8.1 fL on 6/15/

2017



                                                                 at 0733 CDT

 

             NUCLEATED RED BLOOD  /100 WBC    0-0                       This is 

a corrected



             CELLS (BEAKER) (test                                        result.

 Previous



             code = 413)                                         result was 0 /1

00



                                                                 WBC on 6/15/201

7 at



                                                                 0733 CDT



[id number] METABOLIC PANEL2017-06-15 06:27:00





             Test Item    Value        Reference Range Interpretation Comments

 

             SODIUM (BEAKER) 135 meq/L    136-145      L            



             (test code = 381)                                        

 

             POTASSIUM (BEAKER) 4.6 meq/L    3.5-5.1                   Specimen 

slightly



             (test code = 379)                                        hemolyzed

 

             CHLORIDE (BEAKER) 106 meq/L                        



             (test code = 382)                                        

 

             CO2 (BEAKER) (test 17 meq/L     22-29        L            



             code = 355)                                         

 

             BLOOD UREA NITROGEN 20 mg/dL     7-21                      



             (BEAKER) (test code                                        



             = 354)                                              

 

             CREATININE (BEAKER) 0.94 mg/dL   0.57-1.25                 Specimen

 slightly



             (test code = 358)                                        hemolyzed

 

             GLUCOSE RANDOM 237 mg/dL           H            



             (BEAKER) (test code                                        



             = 652)                                              

 

             CALCIUM (BEAKER) 9.1 mg/dL    8.4-10.2                  



             (test code = 697)                                        

 

             EGFR (BEAKER) (test 82 mL/min/1.73                           ESTIMA

MARTHA GFR IS



             code = 1092) sq m                                   NOT AS ACCURATE

 AS



                                                                 CREATININE



                                                                 CLEARANCE IN



                                                                 PREDICTING



                                                                 GLOMERULAR



                                                                 FILTRATION RATE

.



                                                                 ESTIMATED GFR I

S



                                                                 NOT APPLICABLE 

FOR



                                                                 DIALYSIS PATIEN

TS.



POCT-GLUCOSE [id number] 21:00:00





             Test Item    Value        Reference Range Interpretation Comments

 

             POC-GLUCOSE METER 203 mg/dL           H            TESTED AT 

[facility] [address]



             (BEAKER) (test code =                                        [place]



             [address])                                               [address]

## 2020-12-02 NOTE — XMS REPORT
Clinical Summary

                           Created on:2020



Patient:Aj Morocho

Sex:Male

:1955

External Reference #:[id number]





Demographics







                          Address                   [address] [place] [address]

 

                          Home Phone                [phone]

 

                          Mobile Phone              [phone]

 

                          Preferred Language        English

 

                          Marital Status            Unknown

 

                          Gnosticist Affiliation     Unknown

 

                          Race                      White

 

                          Ethnic Group              Not  or 









Author







                          Organization              [facility]

 

                          Address                   [address] [place] [address]









Support







                Name            Relationship    Address         Phone

 

                Oliva Morocho   Unavailable     Unavailable     [phone]

 

                Daniel Benavides      Unavailable     [phone]









Care Team Providers







                    Name                Role                Phone

 

                    Sharpmaisha           Primary Care Provider [phone]









Allergies

No Known Allergies



Medications







          Medication Sig       Dispensed Refills   Start Date End Date  Status

 

          aspirin 81 MG EC Take 81 mg by mouth           0                      

       Active



          tablet    daily.                                            

 

          gabapentin Take 300 mg by mouth           0                           

  Active



          (NEURONTIN) 300 MG 3 (three) times                                    

     



          capsule   daily.                                            

 

          levothyroxine Take 100 mcg by           0                             

Active



          (SYNTHROID, mouth Every morning                                       

  



          LEVOTHROID) 100 MCG on an empty stomach.                              

           



          tablet                                                      

 

          simvastatin (ZOCOR) Take 40 mg by mouth           0                   

          Active



          40 MG tablet nightly.                                          

 

          ticagrelor Take by mouth 2           0                             Act

sonya



          (BRILINTA) 90 mg Tab (two) times daily.                               

          



          tablet                                                      

 

          albuterol HFA Inhale by mouth via           0                         

    Active



          (VENTOLIN HFA) 90 inhaler every 6                                     

    



          mcg/actuation (six) hours as                                         



          inhaler   needed for Wheezing                                         



                    .                                                 

 

          budesonide-formotero Inhale 2 puffs by           0                    

         Active



          l (SYMBICORT) mouth via inhaler 2                                     

    



          160-4.5   (two) times daily.                                         



          mcg/actuation                                                   



          inhaler                                                     

 

          brinzolamide (AZOPT) Place 1 drop into           0                    

         Active



          1 % ophthalmic both eyes 3 (three)                                    

     



          suspension times daily.                                         

 

          latanoprost 1 drop nightly.           0                             Ac

tive



          (XALATAN) 0.005 %                                                   



          ophthalmic solution                                                   

 

          prednisoLONE acetate 1 drop 4 (four)           0                      

       Active



          (PRED FORTE) 1 % times daily.                                         



          ophthalmic                                                   



          suspension                                                   

 

          tiotropium (SPIRIVA) Inhale 18 mcg by           0                     

        Active



          18 mcg inhalation mouth via inhaler                                   

      



          capsule   daily.                                            

 

          traMADol (ULTRAM) 50 Take 50 mg by mouth           0                  

           Active



          mg tablet every 6 (six) hours                                         



                    as needed for Pain.                                         

 

          amoxicillin-clavulan Take 1 tablet by           0                     

        Active



          ate (AUGMENTIN) mouth 2 (two) times                                   

      



          875-125 mg per daily.                                            



          tablet                                                      

 

          fluticasone 1 spray by Nasal           0                             A

ctive



          (FLONASE) 50 route 2 (two) times                                      

   



          mcg/actuation nasal daily.                                            



          spray                                                       

 

          montelukast Take 10 mg by mouth           0                           

  Active



          (SINGULAIR) 10 mg nightly.                                          



          tablet                                                      

 

          pantoprazole Take 40 mg by mouth           0                          

   Active



          (PROTONIX) 40 MG daily.                                            



          tablet                                                      

 

          predniSONE Take 5 mg by mouth           0                             

Active



          (DELTASONE) 5 MG daily.                                            



          tablet                                                      

 

          insulin detemir Inject 20 Units 10 mL     0         06/15/2017        

   Active



          (LEVEMIR) 100 subcutaneously                                         



          unit/mL injection daily.                                            







Active Problems







                          Problem                   Noted Date

 

                          Carotid stenosis          2017

 

                          History of coronary artery disease 2017

 

                          PVD (peripheral vascular disease) 2017

 

                          COPD (chronic obstructive pulmonary disease) 

7

 

                          DM (diabetes mellitus)    2017

 

                          Hypothyroidism            2017

 

                          History of below knee amputation, right: in 

 

                          History of above knee amputation, left: in 2006

 

                          Dizziness                 06/15/2017

 

                          TIA (transient ischemic attack) 2017







Family History







                Medical History Relation        Name            Comments

 

                Heart disease   Father                          









                Relation        Name            Status          Comments

 

                Father                                  

 

                Mother                                  







Social History







             Tobacco Use  Types        Packs/Day    Years Used   Date

 

             Former Smoker              1            46           Quit: 20

12









                Alcohol Use     Drinks/Week     oz/Week         Comments

 

                No                                              









                          Sex Assigned at Birth     Date Recorded

 

                          Not on file               







Last Filed Vital Signs

Not on file



Plan of Treatment

Not on file



Results

Not on fileafter 2019



Insurance







          Payer     Benefit Plan / Group Subscriber ID Effective Dates Phone    

 Address   Type

 

          MEDICARE  MEDICARE A B [id number] 3/1/2008-Present                    

 Medicare









           Guarantor Name Account Type Relation to Date of    Phone      Billing

 Address



                                 Patient    Birth                 

 

           Aj Morocho Personal/Famil Self       1955 [phone]



           [address]     y                                (Home)     [place], [place] [address]

1







Advance Directives

For more information, please contact: [phone]





                Code Status     Date Activated  Date Inactivated Comments

 

                Partial Code    2017 10:30 PM 2017  6:08 PM 









                    This code status was determined by: Patient             

 

                    Drug Protocol After Arrest Occurs? Yes                 

 

                    Mechanical Ventilation with Intubation? No                  

 

                    Bag/Mask?           No                  

 

                    Internal/External Pacemaker? No                  

 

                    Transfer to Critical Care? No                  

 

                    Chest Compressions? No                  

 

                    Defibrillation/Cardioversion? No

## 2020-12-03 PROCEDURE — 06HM33Z INSERTION OF INFUSION DEVICE INTO RIGHT FEMORAL VEIN, PERCUTANEOUS APPROACH: ICD-10-PCS

## 2020-12-03 NOTE — EKG
Test Date:    2020-12-02               Test Time:    21:48:58

Technician:   GREGORY                                    

                                                     

MEASUREMENT RESULTS:                                       

Intervals:                                           

Rate:         83                                     

OH:           148                                    

QRSD:         116                                    

QT:           456                                    

QTc:          535                                    

Axis:                                                

P:            79                                     

OH:           148                                    

QRS:          84                                     

T:            -69                                    

                                                     

INTERPRETIVE STATEMENTS:                                       

                                                     

Normal sinus rhythm

Inferior infarct, age undetermined

Prolonged QT

Abnormal ECG

Compared to ECG 02/11/2020 13:13:51

Myocardial infarct finding now present

Prolonged QT interval now present

Sinus tachycardia no longer present

Atrial abnormality no longer present

Right-axis deviation no longer present

Incomplete right bundle-branch block no longer present

ST (T wave) deviation no longer present



Electronically Signed On 12-03-20 06:07:13 CST by Rupert Ortega

## 2020-12-03 NOTE — ER
Nurse's Notes                                                                                     

 [facility]                                                                 

Name: Aj Morocho                                                                                 

Age: 65 yrs                                                                                       

Sex: Male                                                                                         

: 1955                                                                                   

MRN: [id number]                                                                                   

Arrival Date: 2020                                                                          

Time: 21:36                                                                                       

Account#: [id number]                                                                            

Bed 3                                                                                             

Private MD:                                                                                       

Diagnosis: Diabetic Ketoacidosis;Non ST Elevation MI;Acute Kidney Injury                          

                                                                                                  

Presentation:                                                                                     

                                                                                             

21:36 Chief complaint: EMS states: PATIENT IS DIABETIC AND HAS NOT BEEN TAKING INSULIN FOR    rv  

      TWO DAYS, NOVOLOG AND [place]. BGL READS HIGH. Coronavirus screen: Client denies travel      

      out of the [place] in the last 14 days. Ebola Screen: No symptoms or risks identified at       

      this time. Initial Sepsis Screen: Does the patient meet any 2 criteria? No. Patient's       

      initial sepsis screen is negative. Does the patient have a suspected source of              

      infection? No. Patient's initial sepsis screen is negative. Risk Assessment: Do you         

      want to hurt yourself or someone else? Patient reports no desire to harm self or            

      others. Onset of symptoms is unknown.                                                       

21:36 Method Of Arrival: EMS: [facility]                                                      rv  

21:36 Acuity: OZZY 2                                                                           rv  

                                                                                                  

Triage Assessment:                                                                                

21:41 General: Appears ill, Behavior is drowsy. Pain: Denies pain. EENT: No signs and/or      rv  

      symptoms were reported regarding the EENT system. Neuro: Level of Consciousness is          

      awake, alert, obeys commands, Oriented to person, place, time, situation.                   

      Cardiovascular: Rhythm is sinus rhythm. Respiratory: Airway is patent Respiratory           

      effort is labored, Respiratory pattern is tachypnea. Derm: Skin is intact.                  

                                                                                                  

Historical:                                                                                       

- Allergies:                                                                                      

21:40 NKA;                                                                                    rv  

- PMHx:                                                                                           

21:40 COPD; Diabetes - IDDM; High Cholesterol; Hypertension; Hypothyroidism; Myocardial       rv  

      infarction;                                                                                 

- PSHx:                                                                                           

21:40 Unable to obtain;                                                                       rv  

                                                                                                  

- Immunization history:: Adult Immunizations unknown.                                             

- Social history:: Smoking status: unknown.                                                       

                                                                                                  

                                                                                                  

Screenin:42 Abuse screen: Denies threats or abuse. Denies injuries from another. Nutritional        rv  

      screening: No deficits noted. Tuberculosis screening: No symptoms or risk factors           

      identified. Fall Risk None identified.                                                      

                                                                                                  

Assessment:                                                                                       

22:00 Respiratory: Respiratory pattern is Kussmaul Breath sounds are clear bilaterally.       rv  

                                                                                             

01:26 Reassessment: patient is alert and oriented, hypotensive at 70 systolic, referred to Dr rossy Vargas. Central inserted with Daina THOMAS at bedside, Dopamine started at 5mcg/kg/min.         

      blood pressure is still hypotensive after NS bolus and increased Dopamine dose,             

      Levophed started at 5mcg/hr. blood pressure increased to 100/83, cardiac rhythm changed     

      to sinus tachycardia, Dopamine drip decreased to 5mcg/kg/min. Britney Hugger placed on the     

      patient to maintain normal temperature.                                                     

02:32 Reassessment: [facility]. Pt alert, oriented to   ea  

      self. Pt respirations rate is tachypneic and remain in Kussmaul Breathing. IV lines in      

      place patent with medications running. Pt left ED via stretcher per life flight. Pt         

      tolerating well.                                                                            

                                                                                                  

Vital Signs:                                                                                      

                                                                                             

21:36  / 45; Pulse 84; Resp 24; Temp 98; Weight 72.57 kg;                               rv  

22:43  / 95; Pulse 79; Resp 23; Pulse Ox 100% on 2 lpm NC;                              rv  

23:00  / 61; Pulse 77; Resp 25; Pulse Ox 100% 2 lpm ;                                   rv  

23:17 BP 69 / 32; Pulse 78; Resp 26; Pulse Ox 100% on 2 lpm NC;                               rv  

23:30 BP 70 / 35; Pulse 77; Resp 24; Pulse Ox 100% on 2 lpm NC;                               rv  

23:45 BP 70 / 35; Pulse 86; Resp 23; Pulse Ox 100% on 2 lpm NC;                               rv  

                                                                                             

00:15 BP 74 / 33; Pulse 79; Resp 23; Pulse Ox 100% on 2 lpm NC;                               rv  

00:30 BP 75 / 44; Pulse 82; Resp 19; Pulse Ox 100% on 2 lpm NC;                               rv  

01:00 BP 80 / 35; Pulse 119; Resp 23; Temp 89.4(C); Pulse Ox 100% ;                           rv  

01:25  / 83; Pulse 125; Resp 18; Temp 89.9; Pulse Ox 100% on 2 lpm NC;                  rv  

02:20 BP 99 / 80; Pulse 106; Resp 23; Temp 91; Pulse Ox 100% ;                                ea  

                                                                                                  

ED Course:                                                                                        

                                                                                             

21:36 Patient arrived in ED.                                                                  rv  

21:40 Triage completed.                                                                       rv  

21:41 Arm band placed on right wrist. Patient placed in the treatment room, on a stretcher,   rv  

      Patient notified of wait time.                                                              

21:41 Maintain EMS IV. Dressing intact. Good blood return noted. Site clean \T\ dry. Gauge \T\    rv

      site: G22 RIGHT HAND.                                                                       

21:42 Patient has correct armband on for positive identification. Cardiac monitor on. Pulse   rv  

      ox on. NIBP on.                                                                             

21:43 Lior Vargas MD is Attending Physician.                                             [id number] 

22:00 Inserted MIDLINE [id number], MEENAKSHI.                                                         rv  

22:17 Toi Metcalf, RN is Primary Nurse.                                                  rv  

23:01 XRAY Chest (1 view) In Process Unspecified.                                             EDMS

23:42 Tried to initiate transfer at [facility] with Netta. She stated she would have to call tt3 

      me back.                                                                                    

23:47 Netta returned the call to finish initiating.                                          tt3 

12                                                                                             

00:03 Netta called back to verify that the pt was not covid swabbed.                         tt3 

00:11 Netta called back to decline due to capacity.                                          tt3 

00:15 Inserted saline lock: 18 gauge in left forearm, using aseptic technique.                rv  

00:23 Initiated transfer at [facility] with Sara. Stated she would check for beds and tt3 

      call back.                                                                                  

00:25 Assisted provider with central line placement. Set up central line tray. Triple lumen   rv  

      line placed in right femoral. Line placed by Lior Vargas MD Placement verified by        

      blood return, Dressed with Tegaderm, Patient tolerated well.                                

00:45 Meadows cath inserted, using sterile technique, 18 Fr., by me, balloon inflated, to       rv  

      gravity drainage, urine specimen collected.                                                 

02:36 IV is patent, with fluids infusing freely, Patient transferred, IV remains in place.    rv  

                                                                                                  

Administered Medications:                                                                         

                                                                                             

03:40 Drug: Sodium Bicarbonate 100 mEq Route: IVP; Site: right hand;                          rv  

                                                                                             

02:37 Follow up: Response: No adverse reaction                                                rv  

                                                                                             

22:11 Drug: NS 0.9% 1000 ml Route: IV; Rate: 1 bolus; Site: right antecubital;                rv  

                                                                                             

01:24 Follow up: IV Status: Completed infusion; IV Intake: 1000ml                             rv  

                                                                                             

22:56 Drug: NS 0.9% 1000 ml Route: IV; Rate: 1000 ml; Site: right upper arm;                  rv  

                                                                                             

01:24 Follow up: IV Status: Completed infusion; IV Intake: 1000ml                             rv  

                                                                                             

23:25 Drug: Insulin Regular Human 10 units {Co-Signature: che (Daina Peacock RN).} Route: IVP; rv  

      Site: right hand;                                                                           

                                                                                             

02:37 Follow up: Response: Medication administered at discharge.                              rv  

                                                                                             

23:50 Drug: Insulin Drip - (Insulin Regular Human 100 units, NS 0.9% 100 ml) {Co-Signature:   rossy bolton (Daina Peacock RN).} Route: IV; Rate: calculated rate; Site: right antecubital;          

                                                                                             

02:38 Follow up: IV Status: Infusion continued upon transfer                                  rv  

00:43 Drug: Heparin (MI Drip) 12 units/kg/hr - (HEParin 52838 units, D5W 500 ml)              rv  

      {Co-Signature: che (Daina Peacock RN).} Route: IV; Rate: calculated rate; Site: right        

      femoral;                                                                                    

02:36 Follow up: IV Status: Infusion continued upon transfer                                  rv  

00:44 Drug: Heparin (MI-Bolus with thrombolytic) - HEParin 60 units/kg {Co-Signature: che blair  

      (Daina Peacock RN).} Route: IVP; Site: right femoral;                                       

02:37 Follow up: Response: No adverse reaction                                                rv  

01:09 Drug: Aspirin Chewable Tablet 324 mg Route: PO;                                         ea  

02:36 Follow up: Response: No adverse reaction                                                rv  

                                                                                                  

                                                                                                  

Intake:                                                                                           

01:24 IV: 1000ml; Total: 1000ml.                                                              rv  

01:24 IV: 1000ml; Total: 2000ml.                                                              rv  

                                                                                                  

Outcome:                                                                                          

01:07 ER care complete, transfer ordered by MD. holm 

02:35 Transferred by helicopter to [facility], Transfer form completed. X-rays sent rv  

      w/ patient.                                                                                 

02:35 Condition: stable                                                                           

02:35 Discharge instructions given to son updated regarding transfer. Instructed on the need      

      for transfer.                                                                               

02:38 Patient left the ED.                                                                    che  

                                                                                                  

Signatures:                                                                                       

Dispatcher [employer]                           Daina Headley RN                      Toi Best ea, RN RN rv Holmes, Maurice, MD MD mh7 Trim, Tyler                                  tt3                                                  

Daina Peacock RN, ea                                                   

                                                                                                  

**************************************************************************************************

## 2020-12-03 NOTE — EDPHYS
Physician Documentation                                                                           

 [facility]                                                                 

Name: Aj Morocho                                                                                 

Age: 65 yrs                                                                                       

Sex: Male                                                                                         

: 1955                                                                                   

MRN: [id number]                                                                                   

Arrival Date: 2020                                                                          

Time: 21:36                                                                                       

Account#: [id number]                                                                            

Bed 3                                                                                             

Private MD:                                                                                       

ED Physician Lior Vargas                                                                      

HPI:                                                                                              

                                                                                             

22:30 This 65 yrs old  Male presents to ER via EMS with complaints of High Blood     mh7 

      Sugar.                                                                                      

22:30 The patient or guardian reports hyperglycemia, that was potentially precipitated by     mh7 

      forgetting medications, with the patient's symptoms witnessed by family.                    

22:31 Onset: The symptoms/episode began/occurred 2 day(s) ago.                                mh7 

22:31 Associated signs and symptoms:.                                                         mh7 

                                                                                             

00:40 Associated signs and symptoms: Pertinent negatives: constipation, decreased urine       mh7 

      output, diaphoresis, diarrhea, dry skin, hair loss, ketones in urine, nausea,               

      polydipsia, polyphagia, polyuria, seizure activity, skin flushing, urinary                  

      incontinence, vomiting. Current symptoms: In the emergency department the patient's         

      symptoms are unchanged from the initial presentation. The patient has experienced           

      similar episodes in the past, multiple times.                                               

                                                                                                  

Historical:                                                                                       

- Allergies:                                                                                      

                                                                                             

21:40 NKA;                                                                                    rv  

- PMHx:                                                                                           

21:40 COPD; Diabetes - IDDM; High Cholesterol; Hypertension; Hypothyroidism; Myocardial       rv  

      infarction;                                                                                 

- PSHx:                                                                                           

21:40 Unable to obtain;                                                                       rv  

                                                                                                  

- Immunization history:: Adult Immunizations unknown.                                             

- Social history:: Smoking status: unknown.                                                       

                                                                                                  

                                                                                                  

ROS:                                                                                              

                                                                                             

00:40 Constitutional: Negative for fever, chills, and weight loss, Eyes: Negative for injury, mh7 

      pain, redness, and discharge, ENT: Negative for injury, pain, and discharge, Neck:          

      Negative for injury, pain, and swelling, Cardiovascular: Negative for chest pain,           

      palpitations, and edema, Respiratory: Negative for shortness of breath, cough,              

      wheezing, and pleuritic chest pain, Abdomen/GI: Negative for abdominal pain, nausea,        

      vomiting, diarrhea, and constipation, Back: Negative for injury and pain, : Negative      

      for injury, bleeding, discharge, and swelling, MS/Extremity: Negative for injury and        

      deformity, Skin: Negative for injury, rash, and discoloration, Neuro: Negative for          

      headache, weakness, numbness, tingling, and seizure, Psych: Negative for depression,        

      anxiety, suicide ideation, homicidal ideation, and hallucinations, Allergy/Immunology:      

      Negative for hives, rash, and allergies, Endocrine: Negative for neck swelling,             

      polydipsia, polyuria, polyphagia, and marked weight changes.                                

                                                                                                  

Exam:                                                                                             

00:40 Head/Face:  Normocephalic, atraumatic. Eyes:  Pupils equal round and reactive to light, mh7 

      extra-ocular motions intact.  Lids and lashes normal.  Conjunctiva and sclera are           

      non-icteric and not injected.  Cornea within normal limits.  Periorbital areas with no      

      swelling, redness, or edema.                                                                

00:40 Neck:  Trachea midline, no thyromegaly or masses palpated, and no cervical                  

      lymphadenopathy.  Supple, full range of motion without nuchal rigidity, or vertebral        

      point tenderness.  No Meningismus. Chest/axilla:  Normal chest wall appearance and          

      motion.  Nontender with no deformity.  No lesions are appreciated. Cardiovascular:          

      Regular rate and rhythm with a normal S1 and S2.  No gallops, murmurs, or rubs.  Normal     

      PMI, no JVD.  No pulse deficits.                                                            

00:40 Abdomen/GI:  Soft, non-tender, with normal bowel sounds.  No distension or tympany.  No     

      guarding or rebound.  No evidence of tenderness throughout. Back:  No spinal                

      tenderness.  No costovertebral tenderness.  Full range of motion. Skin:  Warm, dry with     

      normal turgor.  Normal color with no rashes, no lesions, and no evidence of cellulitis.     

00:40 Neuro:  Awake and alert, GCS 15, oriented to person, place, time, and situation.            

      Cranial nerves II-XII grossly intact.  Motor strength 5/5 in all extremities.  Sensory      

      grossly intact.  Cerebellar exam normal.  Normal gait. Psych:  Awake, alert, with           

      orientation to person, place and time.  Behavior, mood, and affect are within normal        

      limits.                                                                                     

00:40 Constitutional: The patient appears alert, awake, obviously ill.                            

00:40 ENT: Mouth: Lips: normal, Oral mucosa: dry, Gums: normal with healthy appearance,           

      Posterior pharynx: is normal, airway is patent.                                             

00:40 Respiratory: mild respiratory distress is noted,  Respirations: tachypnea, Breath           

      sounds: rhonchi, that are mild, are scattered, Respiratory rate:  23                        

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      

      ROM: no acute changes, Circulation is intact in all extremities. Pulses: are normal         

      with no appreciated deficits, Perfusion: the patient is normally perfused throughout,       

      Perfusion: the extremity is normally perfused throughout, Sensation intact.                 

                                                                                                  

Vital Signs:                                                                                      

                                                                                             

21:36  / 45; Pulse 84; Resp 24; Temp 98; Weight 72.57 kg;                               rv  

22:43  / 95; Pulse 79; Resp 23; Pulse Ox 100% on 2 lpm NC;                              rv  

23:00  / 61; Pulse 77; Resp 25; Pulse Ox 100% 2 lpm ;                                   rv  

23:17 BP 69 / 32; Pulse 78; Resp 26; Pulse Ox 100% on 2 lpm NC;                               rv  

23:30 BP 70 / 35; Pulse 77; Resp 24; Pulse Ox 100% on 2 lpm NC;                               rv  

23:45 BP 70 / 35; Pulse 86; Resp 23; Pulse Ox 100% on 2 lpm NC;                               rv  

                                                                                             

00:15 BP 74 / 33; Pulse 79; Resp 23; Pulse Ox 100% on 2 lpm NC;                               rv  

00:30 BP 75 / 44; Pulse 82; Resp 19; Pulse Ox 100% on 2 lpm NC;                               rv  

01:00 BP 80 / 35; Pulse 119; Resp 23; Temp 89.4(C); Pulse Ox 100% ;                           rv  

01:25  / 83; Pulse 125; Resp 18; Temp 89.9; Pulse Ox 100% on 2 lpm NC;                  rv  

02:20 BP 99 / 80; Pulse 106; Resp 23; Temp 91; Pulse Ox 100% ;                                ea  

                                                                                                  

Procedures:                                                                                       

01:08 Central Line: the site was prepped with Betadine, in sterile fashion, a triple lumen    mh7 

      catheter was inserted, in the right femoral vein, in 1 attempts. placement was              

      verified, by blood return, the site was dressed with Tegaderm, using sterile technique,     

      the patient tolerated the procedure, well.                                                  

                                                                                                  

MDM:                                                                                              

01:04 Differential diagnosis: DKA, hyperglycemia, Acute MI, Infectious process. Data          [facility] 

      reviewed: vital signs, nurses notes, EMS record, old medical records, lab test              

      result(s), cardiac enzymes, CBC, electrolytes, urinalysis, EKG, radiologic studies,         

      plain films. Data interpreted: Pulse oximetry: on room air is 100 %. Interpretation:        

      normal. Counseling: I had a detailed discussion with the patient and/or guardian            

      regarding: the historical points, exam findings, and any diagnostic results supporting      

      the discharge/admit diagnosis, lab results, radiology results, the need to transfer to      

      another facility, No ICU bed available. Response to treatment: the patient's symptoms       

      have mildly improved after treatment.                                                       

01:07 Patient medically screened.                                                             mh7 

                                                                                                  

                                                                                             

21:56 Order name: Glucose, Ancillary Testing; Complete Time: 22:30                            EDMS

                                                                                             

22:18 Order name: Basic Metabolic Panel                                                         

                                                                                             

22:18 Order name: CBC with Diff; Complete Time: 22:46                                         rv  

                                                                                             

22:18 Order name: LFT's                                                                       rv  

                                                                                             

22:18 Order name: Magnesium                                                                   rv  

                                                                                             

22:18 Order name: NT PRO-BNP; Complete Time: 23:17                                            rv  

                                                                                             

22:18 Order name: PT-INR; Complete Time: 22:46                                                  

                                                                                             

22:18 Order name: Troponin (emerg Dept Use Only); Complete Time: 23:17                          

                                                                                             

22:18 Order name: Ketone, Serum; Complete Time: 23:17                                           

                                                                                             

22:18 Order name: ABG; Complete Time: 23:14                                                     

                                                                                             

22:19 Order name: Basic Metabolic Panel; Complete Time: 23:17                                 EDMS

                                                                                             

22:19 Order name: Liver (Hepatic) Function; Complete Time: 23:17                              EDMS

                                                                                             

22:19 Order name: Magnesium; Complete Time: 23:17                                             EDMS

                                                                                             

00:16 Order name: Glucose, Ancillary Testing; Complete Time: 00:20                            EDMS

                                                                                             

22:18 Order name: XRAY Chest (1 view)                                                           

                                                                                             

22:18 Order name: EKG; Complete Time: 22:19                                                     

                                                                                             

22:18 Order name: Cardiac monitoring; Complete Time: 22:19                                      

                                                                                             

00:34 Order name: Glucose                                                                     dm5 

                                                                                             

01:14 Order name: Glucose, Ancillary Testing                                                  EDMS

                                                                                             

01:56 Order name: SARS-COV-2 RT PCR                                                           EDMS

                                                                                             

02:01 Order name: Urine Dipstick--Ancillary (enter results)                                   tt3 

                                                                                             

22:18 Order name: EKG - Nurse/Tech; Complete Time: 22:19                                        

                                                                                             

22:18 Order name: IV Saline Lock; Complete Time: 22:19                                        rv  

                                                                                             

22:18 Order name: Labs collected and sent; Complete Time: 22:20                               rv  

                                                                                             

22:18 Order name: O2 Per Protocol; Complete Time: 22:20                                       rv  

                                                                                             

22:18 Order name: O2 Sat Monitoring; Complete Time: 22:20                                     rv  

                                                                                             

23:04 Order name: Urine Dipstick-Ancillary (obtain specimen); Complete Time: 01:24            mh7 

                                                                                                  

Administered Medications:                                                                         

                                                                                             

03:40 Drug: Sodium Bicarbonate 100 mEq Route: IVP; Site: right hand;                          rv  

                                                                                             

02:37 Follow up: Response: No adverse reaction                                                rv  

                                                                                             

22:11 Drug: NS 0.9% 1000 ml Route: IV; Rate: 1 bolus; Site: right antecubital;                  

                                                                                             

01:24 Follow up: IV Status: Completed infusion; IV Intake: 1000ml                             rv  

                                                                                             

22:56 Drug: NS 0.9% 1000 ml Route: IV; Rate: 1000 ml; Site: right upper arm;                  rv  

                                                                                             

01:24 Follow up: IV Status: Completed infusion; IV Intake: 1000ml                             rv  

                                                                                             

23:25 Drug: Insulin Regular Human 10 units {Co-Signature: che (Daina Peacock RN).} Route: IVP; rv  

      Site: right hand;                                                                           

                                                                                             

02:37 Follow up: Response: Medication administered at discharge.                                

                                                                                             

23:50 Drug: Insulin Drip - (Insulin Regular Human 100 units, NS 0.9% 100 ml) {Co-Signature:   rv  

      che (Daina Peacock RN).} Route: IV; Rate: calculated rate; Site: right antecubital;          

                                                                                             

02:38 Follow up: IV Status: Infusion continued upon transfer                                  rv  

00:43 Drug: Heparin (MI Drip) 12 units/kg/hr - (HEParin 51426 units, D5W 500 ml)              rv  

      {Co-Signature: che (Daina Peacock RN).} Route: IV; Rate: calculated rate; Site: right        

      femoral;                                                                                    

02:36 Follow up: IV Status: Infusion continued upon transfer                                  rv  

00:44 Drug: Heparin (MI-Bolus with thrombolytic) - HEParin 60 units/kg {Co-Signature: che      rv  

      (Daina Peacock RN).} Route: IVP; Site: right femoral;                                       

02:37 Follow up: Response: No adverse reaction                                                rv  

01:09 Drug: Aspirin Chewable Tablet 324 mg Route: PO;                                         ea  

02:36 Follow up: Response: No adverse reaction                                                rv  

                                                                                                  

                                                                                                  

Disposition:                                                                                      

20 01:07 Transfer ordered to [facility]. Diagnosis are Diabetic                

  Ketoacidosis, Non ST Elevation MI, Acute Kidney Injury.                                         

- Reason for transfer: Higher level of care.                                                      

- Accepting physician is Dr. Shahid.                                                             

- Condition is Critical.                                                                          

- Problem is an acute exacerbation.                                                               

- Symptoms have improved.                                                                         

                                                                                                  

                                                                                                  

                                                                                                  

Signatures:                                                                                       

Dispatcher [employer]                           EDMS                                                 

Daina Peacock, WILLIAM                      RN   Toi Michael RN                    Lior Greco MD MD   7                                                  

Daina Peacock RN, ea                                                   

                                                                                                  

Corrections: (The following items were deleted from the chart)                                    

00:51 00:25 CORONAVIRUS+MR.LAB.BRZ ordered. [facility]

02:38 01:07 2020 01:07 Transfer ordered to [facility]. Diagnosis is        ea  

      Diabetic Ketoacidosis; Non ST Elevation MI; Acute Kidney Injury. Reason for transfer:       

      Higher level of care. Accepting physician is Dr. Shahid. Condition is Critical.            

      Problem is an acute exacerbation. Symptoms have improved. mh7                               

                                                                                                  

**************************************************************************************************

## 2020-12-03 NOTE — RAD REPORT
EXAM DESCRIPTION:  RAD - Chest Single View - 12/2/2020 11:01 pm

 

CLINICAL HISTORY:  COUGH, hypertension, history of MI

 

COMPARISON:  February 2020

 

TECHNIQUE:  AP portable chest image was obtained 12/2/2020 11:01 pm .

 

FINDINGS:  No focal mass or consolidation. Interstitial pattern is minimally prominent but not substa
ntially different when adjusting for the lower lung volumes. No focal infiltrative process confirmed.
 Failure and volume overload are not suspected.

 

 Heart and vasculature are normal. No measurable pleural effusion and no pneumothorax. No acute bony 
abnormality seen. No acute aortic findings suspected.

 

IMPRESSION:  No acute cardiopulmonary process.

 

Interstitial pattern is not clearly different from comparison.